# Patient Record
Sex: MALE | Race: WHITE | NOT HISPANIC OR LATINO | Employment: OTHER | URBAN - METROPOLITAN AREA
[De-identification: names, ages, dates, MRNs, and addresses within clinical notes are randomized per-mention and may not be internally consistent; named-entity substitution may affect disease eponyms.]

---

## 2024-06-14 ENCOUNTER — APPOINTMENT (OUTPATIENT)
Dept: GENERAL RADIOLOGY | Facility: HOSPITAL | Age: 64
End: 2024-06-14
Payer: MEDICARE

## 2024-06-14 ENCOUNTER — APPOINTMENT (OUTPATIENT)
Dept: CT IMAGING | Facility: HOSPITAL | Age: 64
End: 2024-06-14
Payer: MEDICARE

## 2024-06-14 ENCOUNTER — APPOINTMENT (OUTPATIENT)
Dept: MRI IMAGING | Facility: HOSPITAL | Age: 64
End: 2024-06-14
Payer: MEDICARE

## 2024-06-14 ENCOUNTER — HOSPITAL ENCOUNTER (INPATIENT)
Facility: HOSPITAL | Age: 64
LOS: 2 days | Discharge: NURSING, REHAB OR LONG-TERM CARE | End: 2024-06-18
Attending: EMERGENCY MEDICINE | Admitting: STUDENT IN AN ORGANIZED HEALTH CARE EDUCATION/TRAINING PROGRAM
Payer: MEDICARE

## 2024-06-14 DIAGNOSIS — N28.9 RENAL INSUFFICIENCY: ICD-10-CM

## 2024-06-14 DIAGNOSIS — E11.65 UNCONTROLLED TYPE 2 DIABETES MELLITUS WITH HYPERGLYCEMIA: ICD-10-CM

## 2024-06-14 DIAGNOSIS — R53.1 WEAKNESS: Primary | ICD-10-CM

## 2024-06-14 PROBLEM — G45.9 TIA (TRANSIENT ISCHEMIC ATTACK): Status: ACTIVE | Noted: 2024-06-14

## 2024-06-14 LAB
ABO GROUP BLD: NORMAL
ALBUMIN SERPL-MCNC: 4.2 G/DL (ref 3.5–5.2)
ALBUMIN/GLOB SERPL: 1.4 G/DL
ALP SERPL-CCNC: 96 U/L (ref 39–117)
ALT SERPL W P-5'-P-CCNC: 9 U/L (ref 1–41)
ANION GAP SERPL CALCULATED.3IONS-SCNC: 12.2 MMOL/L (ref 5–15)
APTT PPP: 27.5 SECONDS (ref 24–31)
AST SERPL-CCNC: 14 U/L (ref 1–40)
BASOPHILS # BLD AUTO: 0.06 10*3/MM3 (ref 0–0.2)
BASOPHILS NFR BLD AUTO: 0.5 % (ref 0–1.5)
BILIRUB SERPL-MCNC: 0.2 MG/DL (ref 0–1.2)
BLD GP AB SCN SERPL QL: NEGATIVE
BUN SERPL-MCNC: 30 MG/DL (ref 8–23)
BUN/CREAT SERPL: 14.3 (ref 7–25)
CALCIUM SPEC-SCNC: 9.4 MG/DL (ref 8.6–10.5)
CHLORIDE SERPL-SCNC: 92 MMOL/L (ref 98–107)
CO2 SERPL-SCNC: 29.8 MMOL/L (ref 22–29)
CREAT SERPL-MCNC: 2.1 MG/DL (ref 0.76–1.27)
DEPRECATED RDW RBC AUTO: 43.9 FL (ref 37–54)
EGFRCR SERPLBLD CKD-EPI 2021: 34.5 ML/MIN/1.73
EOSINOPHIL # BLD AUTO: 0.16 10*3/MM3 (ref 0–0.4)
EOSINOPHIL NFR BLD AUTO: 1.5 % (ref 0.3–6.2)
ERYTHROCYTE [DISTWIDTH] IN BLOOD BY AUTOMATED COUNT: 13.5 % (ref 12.3–15.4)
GLOBULIN UR ELPH-MCNC: 3.1 GM/DL
GLUCOSE BLDC GLUCOMTR-MCNC: 336 MG/DL (ref 70–105)
GLUCOSE SERPL-MCNC: 339 MG/DL (ref 65–99)
HCT VFR BLD AUTO: 39.5 % (ref 37.5–51)
HGB BLD-MCNC: 12.7 G/DL (ref 13–17.7)
HOLD SPECIMEN: NORMAL
HOLD SPECIMEN: NORMAL
IMM GRANULOCYTES # BLD AUTO: 0.04 10*3/MM3 (ref 0–0.05)
IMM GRANULOCYTES NFR BLD AUTO: 0.4 % (ref 0–0.5)
INR PPP: <0.93 (ref 0.93–1.1)
LYMPHOCYTES # BLD AUTO: 2.77 10*3/MM3 (ref 0.7–3.1)
LYMPHOCYTES NFR BLD AUTO: 25.1 % (ref 19.6–45.3)
MCH RBC QN AUTO: 28.4 PG (ref 26.6–33)
MCHC RBC AUTO-ENTMCNC: 32.2 G/DL (ref 31.5–35.7)
MCV RBC AUTO: 88.4 FL (ref 79–97)
MONOCYTES # BLD AUTO: 0.91 10*3/MM3 (ref 0.1–0.9)
MONOCYTES NFR BLD AUTO: 8.3 % (ref 5–12)
NEUTROPHILS NFR BLD AUTO: 64.2 % (ref 42.7–76)
NEUTROPHILS NFR BLD AUTO: 7.08 10*3/MM3 (ref 1.7–7)
NRBC BLD AUTO-RTO: 0 /100 WBC (ref 0–0.2)
PLATELET # BLD AUTO: 403 10*3/MM3 (ref 140–450)
PMV BLD AUTO: 10.1 FL (ref 6–12)
POTASSIUM SERPL-SCNC: 4.5 MMOL/L (ref 3.5–5.2)
PROT SERPL-MCNC: 7.3 G/DL (ref 6–8.5)
PROTHROMBIN TIME: 10.1 SECONDS (ref 9.6–11.7)
RBC # BLD AUTO: 4.47 10*6/MM3 (ref 4.14–5.8)
RH BLD: POSITIVE
SODIUM SERPL-SCNC: 134 MMOL/L (ref 136–145)
T&S EXPIRATION DATE: NORMAL
TROPONIN T SERPL HS-MCNC: 27 NG/L
WBC NRBC COR # BLD AUTO: 11.02 10*3/MM3 (ref 3.4–10.8)
WHOLE BLOOD HOLD COAG: NORMAL
WHOLE BLOOD HOLD SPECIMEN: NORMAL

## 2024-06-14 PROCEDURE — 86900 BLOOD TYPING SEROLOGIC ABO: CPT

## 2024-06-14 PROCEDURE — 82948 REAGENT STRIP/BLOOD GLUCOSE: CPT

## 2024-06-14 PROCEDURE — 86901 BLOOD TYPING SEROLOGIC RH(D): CPT

## 2024-06-14 PROCEDURE — 70498 CT ANGIOGRAPHY NECK: CPT

## 2024-06-14 PROCEDURE — 36415 COLL VENOUS BLD VENIPUNCTURE: CPT

## 2024-06-14 PROCEDURE — 85730 THROMBOPLASTIN TIME PARTIAL: CPT | Performed by: EMERGENCY MEDICINE

## 2024-06-14 PROCEDURE — 85025 COMPLETE CBC W/AUTO DIFF WBC: CPT | Performed by: EMERGENCY MEDICINE

## 2024-06-14 PROCEDURE — 70496 CT ANGIOGRAPHY HEAD: CPT

## 2024-06-14 PROCEDURE — 86901 BLOOD TYPING SEROLOGIC RH(D): CPT | Performed by: EMERGENCY MEDICINE

## 2024-06-14 PROCEDURE — 25510000001 IOPAMIDOL PER 1 ML: Performed by: EMERGENCY MEDICINE

## 2024-06-14 PROCEDURE — 80053 COMPREHEN METABOLIC PANEL: CPT | Performed by: EMERGENCY MEDICINE

## 2024-06-14 PROCEDURE — 71045 X-RAY EXAM CHEST 1 VIEW: CPT

## 2024-06-14 PROCEDURE — 0042T HC CT CEREBRAL PERFUSION W/WO CONTRAST: CPT

## 2024-06-14 PROCEDURE — 86900 BLOOD TYPING SEROLOGIC ABO: CPT | Performed by: EMERGENCY MEDICINE

## 2024-06-14 PROCEDURE — 84484 ASSAY OF TROPONIN QUANT: CPT | Performed by: EMERGENCY MEDICINE

## 2024-06-14 PROCEDURE — 99285 EMERGENCY DEPT VISIT HI MDM: CPT

## 2024-06-14 PROCEDURE — 86850 RBC ANTIBODY SCREEN: CPT | Performed by: EMERGENCY MEDICINE

## 2024-06-14 PROCEDURE — 70450 CT HEAD/BRAIN W/O DYE: CPT

## 2024-06-14 PROCEDURE — 99222 1ST HOSP IP/OBS MODERATE 55: CPT | Performed by: PSYCHIATRY & NEUROLOGY

## 2024-06-14 PROCEDURE — 70551 MRI BRAIN STEM W/O DYE: CPT

## 2024-06-14 PROCEDURE — 93005 ELECTROCARDIOGRAM TRACING: CPT | Performed by: EMERGENCY MEDICINE

## 2024-06-14 PROCEDURE — 85610 PROTHROMBIN TIME: CPT | Performed by: EMERGENCY MEDICINE

## 2024-06-14 RX ORDER — SODIUM CHLORIDE 0.9 % (FLUSH) 0.9 %
10 SYRINGE (ML) INJECTION AS NEEDED
Status: DISCONTINUED | OUTPATIENT
Start: 2024-06-14 | End: 2024-06-18 | Stop reason: HOSPADM

## 2024-06-14 RX ORDER — ACETAMINOPHEN 650 MG/1
650 SUPPOSITORY RECTAL EVERY 4 HOURS PRN
Status: DISCONTINUED | OUTPATIENT
Start: 2024-06-14 | End: 2024-06-18 | Stop reason: HOSPADM

## 2024-06-14 RX ORDER — POLYETHYLENE GLYCOL 3350 17 G/17G
17 POWDER, FOR SOLUTION ORAL DAILY PRN
Status: DISCONTINUED | OUTPATIENT
Start: 2024-06-14 | End: 2024-06-18 | Stop reason: HOSPADM

## 2024-06-14 RX ORDER — ATORVASTATIN CALCIUM 40 MG/1
80 TABLET, FILM COATED ORAL NIGHTLY
Status: DISCONTINUED | OUTPATIENT
Start: 2024-06-14 | End: 2024-06-17

## 2024-06-14 RX ORDER — HEPARIN SODIUM 5000 [USP'U]/ML
5000 INJECTION, SOLUTION INTRAVENOUS; SUBCUTANEOUS EVERY 8 HOURS SCHEDULED
Status: DISCONTINUED | OUTPATIENT
Start: 2024-06-15 | End: 2024-06-18 | Stop reason: HOSPADM

## 2024-06-14 RX ORDER — BISACODYL 5 MG/1
5 TABLET, DELAYED RELEASE ORAL DAILY PRN
Status: DISCONTINUED | OUTPATIENT
Start: 2024-06-14 | End: 2024-06-18 | Stop reason: HOSPADM

## 2024-06-14 RX ORDER — ASPIRIN 81 MG/1
81 TABLET, CHEWABLE ORAL DAILY
Status: DISCONTINUED | OUTPATIENT
Start: 2024-06-15 | End: 2024-06-18 | Stop reason: HOSPADM

## 2024-06-14 RX ORDER — CLOPIDOGREL 300 MG/1
300 TABLET, FILM COATED ORAL ONCE
Status: COMPLETED | OUTPATIENT
Start: 2024-06-14 | End: 2024-06-14

## 2024-06-14 RX ORDER — ALUMINA, MAGNESIA, AND SIMETHICONE 2400; 2400; 240 MG/30ML; MG/30ML; MG/30ML
7.5 SUSPENSION ORAL EVERY 4 HOURS PRN
Status: DISCONTINUED | OUTPATIENT
Start: 2024-06-14 | End: 2024-06-18 | Stop reason: HOSPADM

## 2024-06-14 RX ORDER — BISACODYL 10 MG
10 SUPPOSITORY, RECTAL RECTAL DAILY PRN
Status: DISCONTINUED | OUTPATIENT
Start: 2024-06-14 | End: 2024-06-18 | Stop reason: HOSPADM

## 2024-06-14 RX ORDER — BISACODYL 10 MG
10 SUPPOSITORY, RECTAL RECTAL DAILY PRN
Status: DISCONTINUED | OUTPATIENT
Start: 2024-06-14 | End: 2024-06-14 | Stop reason: SDUPTHER

## 2024-06-14 RX ORDER — SODIUM CHLORIDE 0.9 % (FLUSH) 0.9 %
10 SYRINGE (ML) INJECTION EVERY 12 HOURS SCHEDULED
Status: DISCONTINUED | OUTPATIENT
Start: 2024-06-14 | End: 2024-06-18 | Stop reason: HOSPADM

## 2024-06-14 RX ORDER — AMOXICILLIN 250 MG
2 CAPSULE ORAL 2 TIMES DAILY PRN
Status: DISCONTINUED | OUTPATIENT
Start: 2024-06-14 | End: 2024-06-18 | Stop reason: HOSPADM

## 2024-06-14 RX ORDER — ASPIRIN 300 MG/1
300 SUPPOSITORY RECTAL DAILY
Status: DISCONTINUED | OUTPATIENT
Start: 2024-06-15 | End: 2024-06-18 | Stop reason: HOSPADM

## 2024-06-14 RX ORDER — SODIUM CHLORIDE 9 MG/ML
40 INJECTION, SOLUTION INTRAVENOUS AS NEEDED
Status: DISCONTINUED | OUTPATIENT
Start: 2024-06-14 | End: 2024-06-18 | Stop reason: HOSPADM

## 2024-06-14 RX ORDER — CLOPIDOGREL BISULFATE 75 MG/1
75 TABLET ORAL DAILY
Status: DISCONTINUED | OUTPATIENT
Start: 2024-06-15 | End: 2024-06-18 | Stop reason: HOSPADM

## 2024-06-14 RX ORDER — ACETAMINOPHEN 325 MG/1
650 TABLET ORAL EVERY 4 HOURS PRN
Status: DISCONTINUED | OUTPATIENT
Start: 2024-06-14 | End: 2024-06-18 | Stop reason: HOSPADM

## 2024-06-14 RX ORDER — ONDANSETRON 2 MG/ML
4 INJECTION INTRAMUSCULAR; INTRAVENOUS EVERY 6 HOURS PRN
Status: DISCONTINUED | OUTPATIENT
Start: 2024-06-14 | End: 2024-06-18 | Stop reason: HOSPADM

## 2024-06-14 RX ADMIN — CLOPIDOGREL BISULFATE 300 MG: 300 TABLET, FILM COATED ORAL at 22:27

## 2024-06-14 RX ADMIN — ATORVASTATIN CALCIUM 80 MG: 40 TABLET, FILM COATED ORAL at 22:27

## 2024-06-14 RX ADMIN — Medication 10 ML: at 22:29

## 2024-06-14 RX ADMIN — IOPAMIDOL 150 ML: 755 INJECTION, SOLUTION INTRAVENOUS at 21:47

## 2024-06-14 NOTE — Clinical Note
Level of Care: Telemetry [5]   Admitting Physician: LLANES ALVAREZ, CARLOS [[id number]]   Attending Physician: LLANES ALVAREZ, CARLOS [[id number]]   Bed Request Comments: raul

## 2024-06-14 NOTE — LETTER
EMS Transport Request  For use at [facility], Given, Ambrosio, Sudhakar, and Shine only   Patient Name: Matthieu Hawk : 1960   Weight:(!) 168 kg (370 lb) Pick-up Location: [facility] BLS/ALS: BLS/ALS: BLS   Insurance: [employer] MEDICARE REPLACEMENT Auth End Date:    Pre-Cert #: D/C Summary complete:    Destination: Other [facility].   Room 214   Contact Precautions: None   Equipment (O2, Fluids, etc.): O2, settings 3L   Arrive By Date/Time: 24 Stretcher/WC: Wheelchair   CM Requesting: Sade Valero RN Ext: [phone]   Notes/Medical Necessity: CGA-Min A. For transfers     ______________________________________________________________________    *Only 2 patient bags OR 1 carry-on size bag are permitted.  Wheelchairs and walkers CANNOT transported with the patient. Acknowledge: Yes

## 2024-06-15 ENCOUNTER — APPOINTMENT (OUTPATIENT)
Dept: MRI IMAGING | Facility: HOSPITAL | Age: 64
End: 2024-06-15
Payer: MEDICARE

## 2024-06-15 ENCOUNTER — APPOINTMENT (OUTPATIENT)
Dept: CARDIOLOGY | Facility: HOSPITAL | Age: 64
End: 2024-06-15
Payer: MEDICARE

## 2024-06-15 ENCOUNTER — APPOINTMENT (OUTPATIENT)
Dept: ULTRASOUND IMAGING | Facility: HOSPITAL | Age: 64
End: 2024-06-15
Payer: MEDICARE

## 2024-06-15 LAB
ALBUMIN SERPL-MCNC: 4.1 G/DL (ref 3.5–5.2)
ALBUMIN/GLOB SERPL: 1.3 G/DL
ALP SERPL-CCNC: 91 U/L (ref 39–117)
ALT SERPL W P-5'-P-CCNC: 8 U/L (ref 1–41)
ANION GAP SERPL CALCULATED.3IONS-SCNC: 11.7 MMOL/L (ref 5–15)
AST SERPL-CCNC: 17 U/L (ref 1–40)
BASOPHILS # BLD AUTO: 0.06 10*3/MM3 (ref 0–0.2)
BASOPHILS NFR BLD AUTO: 0.6 % (ref 0–1.5)
BH CV XLRA MEAS LEFT DIST CCA EDV: 17.6 CM/SEC
BH CV XLRA MEAS LEFT DIST CCA PSV: 93.3 CM/SEC
BH CV XLRA MEAS LEFT DIST ICA EDV: -25.2 CM/SEC
BH CV XLRA MEAS LEFT DIST ICA PSV: -92.5 CM/SEC
BH CV XLRA MEAS LEFT ICA/CCA RATIO: -0.89
BH CV XLRA MEAS LEFT PROX CCA EDV: 25.2 CM/SEC
BH CV XLRA MEAS LEFT PROX CCA PSV: 104 CM/SEC
BH CV XLRA MEAS LEFT PROX ECA PSV: -109 CM/SEC
BH CV XLRA MEAS LEFT PROX ICA EDV: -18.9 CM/SEC
BH CV XLRA MEAS LEFT PROX ICA PSV: -89.7 CM/SEC
BH CV XLRA MEAS LEFT PROX SCLA PSV: 140 CM/SEC
BH CV XLRA MEAS LEFT VERTEBRAL A EDV: -10.5 CM/SEC
BH CV XLRA MEAS LEFT VERTEBRAL A PSV: -48.3 CM/SEC
BH CV XLRA MEAS RIGHT DIST CCA EDV: 16.8 CM/SEC
BH CV XLRA MEAS RIGHT DIST CCA PSV: 85.7 CM/SEC
BH CV XLRA MEAS RIGHT DIST ICA EDV: -24.9 CM/SEC
BH CV XLRA MEAS RIGHT DIST ICA PSV: -73.3 CM/SEC
BH CV XLRA MEAS RIGHT ICA/CCA RATIO: -0.75
BH CV XLRA MEAS RIGHT PROX CCA EDV: 18 CM/SEC
BH CV XLRA MEAS RIGHT PROX CCA PSV: 97.5 CM/SEC
BH CV XLRA MEAS RIGHT PROX ECA PSV: -112 CM/SEC
BH CV XLRA MEAS RIGHT PROX ICA EDV: -18 CM/SEC
BH CV XLRA MEAS RIGHT PROX ICA PSV: -65.2 CM/SEC
BH CV XLRA MEAS RIGHT PROX SCLA PSV: 136 CM/SEC
BH CV XLRA MEAS RIGHT VERTEBRAL A EDV: 16.8 CM/SEC
BH CV XLRA MEAS RIGHT VERTEBRAL A PSV: 58.4 CM/SEC
BILIRUB SERPL-MCNC: 0.2 MG/DL (ref 0–1.2)
BUN SERPL-MCNC: 32 MG/DL (ref 8–23)
BUN/CREAT SERPL: 18.2 (ref 7–25)
CALCIUM SPEC-SCNC: 9.3 MG/DL (ref 8.6–10.5)
CHLORIDE SERPL-SCNC: 94 MMOL/L (ref 98–107)
CHOLEST SERPL-MCNC: 127 MG/DL (ref 0–200)
CO2 SERPL-SCNC: 28.3 MMOL/L (ref 22–29)
CREAT SERPL-MCNC: 1.76 MG/DL (ref 0.76–1.27)
DEPRECATED RDW RBC AUTO: 43.6 FL (ref 37–54)
EGFRCR SERPLBLD CKD-EPI 2021: 42.6 ML/MIN/1.73
EOSINOPHIL # BLD AUTO: 0.11 10*3/MM3 (ref 0–0.4)
EOSINOPHIL NFR BLD AUTO: 1 % (ref 0.3–6.2)
ERYTHROCYTE [DISTWIDTH] IN BLOOD BY AUTOMATED COUNT: 13.4 % (ref 12.3–15.4)
ERYTHROCYTE [SEDIMENTATION RATE] IN BLOOD: 37 MM/HR (ref 0–20)
FOLATE SERPL-MCNC: 3.5 NG/ML (ref 4.78–24.2)
GEN 5 2HR TROPONIN T REFLEX: 20 NG/L
GLOBULIN UR ELPH-MCNC: 3.2 GM/DL
GLUCOSE BLDC GLUCOMTR-MCNC: 313 MG/DL (ref 70–105)
GLUCOSE BLDC GLUCOMTR-MCNC: 325 MG/DL (ref 70–105)
GLUCOSE BLDC GLUCOMTR-MCNC: 351 MG/DL (ref 70–105)
GLUCOSE BLDC GLUCOMTR-MCNC: 387 MG/DL (ref 70–105)
GLUCOSE SERPL-MCNC: 322 MG/DL (ref 65–99)
HBA1C MFR BLD: 9.68 % (ref 4.8–5.6)
HCT VFR BLD AUTO: 40.4 % (ref 37.5–51)
HDLC SERPL-MCNC: 40 MG/DL (ref 40–60)
HGB BLD-MCNC: 12.7 G/DL (ref 13–17.7)
IMM GRANULOCYTES # BLD AUTO: 0.03 10*3/MM3 (ref 0–0.05)
IMM GRANULOCYTES NFR BLD AUTO: 0.3 % (ref 0–0.5)
LDLC SERPL CALC-MCNC: 47 MG/DL (ref 0–100)
LDLC/HDLC SERPL: 0.89 {RATIO}
LYMPHOCYTES # BLD AUTO: 1.79 10*3/MM3 (ref 0.7–3.1)
LYMPHOCYTES NFR BLD AUTO: 16.6 % (ref 19.6–45.3)
MAGNESIUM SERPL-MCNC: 2.2 MG/DL (ref 1.6–2.4)
MCH RBC QN AUTO: 27.8 PG (ref 26.6–33)
MCHC RBC AUTO-ENTMCNC: 31.4 G/DL (ref 31.5–35.7)
MCV RBC AUTO: 88.4 FL (ref 79–97)
MONOCYTES # BLD AUTO: 0.78 10*3/MM3 (ref 0.1–0.9)
MONOCYTES NFR BLD AUTO: 7.2 % (ref 5–12)
NEUTROPHILS NFR BLD AUTO: 74.3 % (ref 42.7–76)
NEUTROPHILS NFR BLD AUTO: 8.03 10*3/MM3 (ref 1.7–7)
NRBC BLD AUTO-RTO: 0 /100 WBC (ref 0–0.2)
PHOSPHATE SERPL-MCNC: 4.9 MG/DL (ref 2.5–4.5)
PLATELET # BLD AUTO: 416 10*3/MM3 (ref 140–450)
PMV BLD AUTO: 10.2 FL (ref 6–12)
POTASSIUM SERPL-SCNC: 4.9 MMOL/L (ref 3.5–5.2)
PROT SERPL-MCNC: 7.3 G/DL (ref 6–8.5)
RBC # BLD AUTO: 4.57 10*6/MM3 (ref 4.14–5.8)
SODIUM SERPL-SCNC: 134 MMOL/L (ref 136–145)
TRIGL SERPL-MCNC: 257 MG/DL (ref 0–150)
TROPONIN T DELTA: -6 NG/L
TROPONIN T SERPL HS-MCNC: 26 NG/L
TSH SERPL DL<=0.05 MIU/L-ACNC: 0.94 UIU/ML (ref 0.27–4.2)
VIT B12 BLD-MCNC: 501 PG/ML (ref 211–946)
VLDLC SERPL-MCNC: 40 MG/DL (ref 5–40)
WBC NRBC COR # BLD AUTO: 10.8 10*3/MM3 (ref 3.4–10.8)

## 2024-06-15 PROCEDURE — 85025 COMPLETE CBC W/AUTO DIFF WBC: CPT | Performed by: STUDENT IN AN ORGANIZED HEALTH CARE EDUCATION/TRAINING PROGRAM

## 2024-06-15 PROCEDURE — 85652 RBC SED RATE AUTOMATED: CPT | Performed by: PSYCHIATRY & NEUROLOGY

## 2024-06-15 PROCEDURE — 82948 REAGENT STRIP/BLOOD GLUCOSE: CPT | Performed by: STUDENT IN AN ORGANIZED HEALTH CARE EDUCATION/TRAINING PROGRAM

## 2024-06-15 PROCEDURE — 93306 TTE W/DOPPLER COMPLETE: CPT | Performed by: INTERNAL MEDICINE

## 2024-06-15 PROCEDURE — 84100 ASSAY OF PHOSPHORUS: CPT | Performed by: STUDENT IN AN ORGANIZED HEALTH CARE EDUCATION/TRAINING PROGRAM

## 2024-06-15 PROCEDURE — 93306 TTE W/DOPPLER COMPLETE: CPT

## 2024-06-15 PROCEDURE — G0378 HOSPITAL OBSERVATION PER HR: HCPCS

## 2024-06-15 PROCEDURE — 93880 EXTRACRANIAL BILAT STUDY: CPT

## 2024-06-15 PROCEDURE — 97162 PT EVAL MOD COMPLEX 30 MIN: CPT

## 2024-06-15 PROCEDURE — 99232 SBSQ HOSP IP/OBS MODERATE 35: CPT | Performed by: NURSE PRACTITIONER

## 2024-06-15 PROCEDURE — 93880 EXTRACRANIAL BILAT STUDY: CPT | Performed by: SURGERY

## 2024-06-15 PROCEDURE — 25010000002 GADOTERIDOL PER 1 ML: Performed by: STUDENT IN AN ORGANIZED HEALTH CARE EDUCATION/TRAINING PROGRAM

## 2024-06-15 PROCEDURE — 83735 ASSAY OF MAGNESIUM: CPT | Performed by: STUDENT IN AN ORGANIZED HEALTH CARE EDUCATION/TRAINING PROGRAM

## 2024-06-15 PROCEDURE — 25010000002 HEPARIN (PORCINE) PER 1000 UNITS: Performed by: STUDENT IN AN ORGANIZED HEALTH CARE EDUCATION/TRAINING PROGRAM

## 2024-06-15 PROCEDURE — 80053 COMPREHEN METABOLIC PANEL: CPT | Performed by: STUDENT IN AN ORGANIZED HEALTH CARE EDUCATION/TRAINING PROGRAM

## 2024-06-15 PROCEDURE — 63710000001 INSULIN GLARGINE PER 5 UNITS: Performed by: STUDENT IN AN ORGANIZED HEALTH CARE EDUCATION/TRAINING PROGRAM

## 2024-06-15 PROCEDURE — A9579 GAD-BASE MR CONTRAST NOS,1ML: HCPCS | Performed by: STUDENT IN AN ORGANIZED HEALTH CARE EDUCATION/TRAINING PROGRAM

## 2024-06-15 PROCEDURE — 76775 US EXAM ABDO BACK WALL LIM: CPT

## 2024-06-15 PROCEDURE — 25010000002 ONDANSETRON PER 1 MG: Performed by: PSYCHIATRY & NEUROLOGY

## 2024-06-15 PROCEDURE — 82746 ASSAY OF FOLIC ACID SERUM: CPT | Performed by: PSYCHIATRY & NEUROLOGY

## 2024-06-15 PROCEDURE — 63710000001 INSULIN LISPRO (HUMAN) PER 5 UNITS: Performed by: STUDENT IN AN ORGANIZED HEALTH CARE EDUCATION/TRAINING PROGRAM

## 2024-06-15 PROCEDURE — 25810000003 LACTATED RINGERS PER 1000 ML: Performed by: STUDENT IN AN ORGANIZED HEALTH CARE EDUCATION/TRAINING PROGRAM

## 2024-06-15 PROCEDURE — 72157 MRI CHEST SPINE W/O & W/DYE: CPT

## 2024-06-15 PROCEDURE — 82607 VITAMIN B-12: CPT | Performed by: PSYCHIATRY & NEUROLOGY

## 2024-06-15 PROCEDURE — 84484 ASSAY OF TROPONIN QUANT: CPT | Performed by: STUDENT IN AN ORGANIZED HEALTH CARE EDUCATION/TRAINING PROGRAM

## 2024-06-15 PROCEDURE — 82948 REAGENT STRIP/BLOOD GLUCOSE: CPT

## 2024-06-15 PROCEDURE — 83036 HEMOGLOBIN GLYCOSYLATED A1C: CPT | Performed by: PSYCHIATRY & NEUROLOGY

## 2024-06-15 PROCEDURE — 84443 ASSAY THYROID STIM HORMONE: CPT | Performed by: PSYCHIATRY & NEUROLOGY

## 2024-06-15 PROCEDURE — 80061 LIPID PANEL: CPT | Performed by: PSYCHIATRY & NEUROLOGY

## 2024-06-15 PROCEDURE — 72158 MRI LUMBAR SPINE W/O & W/DYE: CPT

## 2024-06-15 PROCEDURE — 25010000002 SULFUR HEXAFLUORIDE MICROSPH 60.7-25 MG RECONSTITUTED SUSPENSION: Performed by: STUDENT IN AN ORGANIZED HEALTH CARE EDUCATION/TRAINING PROGRAM

## 2024-06-15 RX ORDER — CARVEDILOL 6.25 MG/1
12.5 TABLET ORAL 2 TIMES DAILY WITH MEALS
Status: DISCONTINUED | OUTPATIENT
Start: 2024-06-15 | End: 2024-06-17

## 2024-06-15 RX ORDER — GABAPENTIN 300 MG/1
300 CAPSULE ORAL 3 TIMES DAILY
Status: DISCONTINUED | OUTPATIENT
Start: 2024-06-15 | End: 2024-06-18 | Stop reason: HOSPADM

## 2024-06-15 RX ORDER — DEXTROSE MONOHYDRATE 25 G/50ML
25 INJECTION, SOLUTION INTRAVENOUS
Status: DISCONTINUED | OUTPATIENT
Start: 2024-06-15 | End: 2024-06-18 | Stop reason: HOSPADM

## 2024-06-15 RX ORDER — SODIUM CHLORIDE, SODIUM LACTATE, POTASSIUM CHLORIDE, CALCIUM CHLORIDE 600; 310; 30; 20 MG/100ML; MG/100ML; MG/100ML; MG/100ML
75 INJECTION, SOLUTION INTRAVENOUS CONTINUOUS
Status: DISCONTINUED | OUTPATIENT
Start: 2024-06-15 | End: 2024-06-15

## 2024-06-15 RX ORDER — AMLODIPINE BESYLATE 5 MG/1
5 TABLET ORAL
Status: DISCONTINUED | OUTPATIENT
Start: 2024-06-15 | End: 2024-06-17

## 2024-06-15 RX ORDER — NICOTINE POLACRILEX 4 MG
15 LOZENGE BUCCAL
Status: DISCONTINUED | OUTPATIENT
Start: 2024-06-15 | End: 2024-06-18 | Stop reason: HOSPADM

## 2024-06-15 RX ORDER — FINASTERIDE 5 MG/1
5 TABLET, FILM COATED ORAL DAILY
Status: DISCONTINUED | OUTPATIENT
Start: 2024-06-15 | End: 2024-06-17

## 2024-06-15 RX ORDER — INSULIN LISPRO 100 [IU]/ML
2-9 INJECTION, SOLUTION INTRAVENOUS; SUBCUTANEOUS
Status: DISCONTINUED | OUTPATIENT
Start: 2024-06-15 | End: 2024-06-17

## 2024-06-15 RX ORDER — SODIUM CHLORIDE, SODIUM LACTATE, POTASSIUM CHLORIDE, CALCIUM CHLORIDE 600; 310; 30; 20 MG/100ML; MG/100ML; MG/100ML; MG/100ML
100 INJECTION, SOLUTION INTRAVENOUS CONTINUOUS
Status: DISCONTINUED | OUTPATIENT
Start: 2024-06-15 | End: 2024-06-16

## 2024-06-15 RX ORDER — IBUPROFEN 600 MG/1
1 TABLET ORAL
Status: DISCONTINUED | OUTPATIENT
Start: 2024-06-15 | End: 2024-06-18 | Stop reason: HOSPADM

## 2024-06-15 RX ORDER — FOLIC ACID 1 MG/1
1 TABLET ORAL DAILY
Status: DISCONTINUED | OUTPATIENT
Start: 2024-06-15 | End: 2024-06-18 | Stop reason: HOSPADM

## 2024-06-15 RX ORDER — DULOXETIN HYDROCHLORIDE 30 MG/1
60 CAPSULE, DELAYED RELEASE ORAL EVERY 12 HOURS SCHEDULED
Status: DISCONTINUED | OUTPATIENT
Start: 2024-06-15 | End: 2024-06-17

## 2024-06-15 RX ORDER — HYDROCODONE BITARTRATE AND ACETAMINOPHEN 10; 325 MG/1; MG/1
1 TABLET ORAL EVERY 6 HOURS PRN
Status: DISCONTINUED | OUTPATIENT
Start: 2024-06-15 | End: 2024-06-18 | Stop reason: HOSPADM

## 2024-06-15 RX ADMIN — ATORVASTATIN CALCIUM 80 MG: 40 TABLET, FILM COATED ORAL at 20:06

## 2024-06-15 RX ADMIN — CLOPIDOGREL BISULFATE 75 MG: 75 TABLET ORAL at 09:39

## 2024-06-15 RX ADMIN — DULOXETINE HYDROCHLORIDE 60 MG: 30 CAPSULE, DELAYED RELEASE ORAL at 20:06

## 2024-06-15 RX ADMIN — Medication 10 ML: at 00:24

## 2024-06-15 RX ADMIN — HYDROCODONE BITARTRATE AND ACETAMINOPHEN 1 TABLET: 10; 325 TABLET ORAL at 20:06

## 2024-06-15 RX ADMIN — HEPARIN SODIUM 5000 UNITS: 5000 INJECTION INTRAVENOUS; SUBCUTANEOUS at 05:57

## 2024-06-15 RX ADMIN — HEPARIN SODIUM 5000 UNITS: 5000 INJECTION INTRAVENOUS; SUBCUTANEOUS at 21:04

## 2024-06-15 RX ADMIN — INSULIN GLARGINE 10 UNITS: 100 INJECTION, SOLUTION SUBCUTANEOUS at 20:07

## 2024-06-15 RX ADMIN — Medication 10 ML: at 09:39

## 2024-06-15 RX ADMIN — FOLIC ACID 1 MG: 1 TABLET ORAL at 17:55

## 2024-06-15 RX ADMIN — FINASTERIDE 5 MG: 5 TABLET, FILM COATED ORAL at 09:38

## 2024-06-15 RX ADMIN — AMLODIPINE BESYLATE 5 MG: 5 TABLET ORAL at 09:39

## 2024-06-15 RX ADMIN — INSULIN GLARGINE 10 UNITS: 100 INJECTION, SOLUTION SUBCUTANEOUS at 09:39

## 2024-06-15 RX ADMIN — Medication 10 ML: at 20:07

## 2024-06-15 RX ADMIN — Medication 10 ML: at 11:14

## 2024-06-15 RX ADMIN — CARVEDILOL 12.5 MG: 6.25 TABLET, FILM COATED ORAL at 09:39

## 2024-06-15 RX ADMIN — ASPIRIN 81 MG CHEWABLE TABLET 81 MG: 81 TABLET CHEWABLE at 09:38

## 2024-06-15 RX ADMIN — INSULIN LISPRO 7 UNITS: 100 INJECTION, SOLUTION INTRAVENOUS; SUBCUTANEOUS at 17:55

## 2024-06-15 RX ADMIN — ONDANSETRON 4 MG: 2 INJECTION INTRAMUSCULAR; INTRAVENOUS at 00:21

## 2024-06-15 RX ADMIN — SODIUM CHLORIDE, POTASSIUM CHLORIDE, SODIUM LACTATE AND CALCIUM CHLORIDE 100 ML/HR: 600; 310; 30; 20 INJECTION, SOLUTION INTRAVENOUS at 17:56

## 2024-06-15 RX ADMIN — DULOXETINE HYDROCHLORIDE 60 MG: 30 CAPSULE, DELAYED RELEASE ORAL at 09:38

## 2024-06-15 RX ADMIN — HEPARIN SODIUM 5000 UNITS: 5000 INJECTION INTRAVENOUS; SUBCUTANEOUS at 13:00

## 2024-06-15 RX ADMIN — SULFUR HEXAFLUORIDE 2 ML: KIT at 09:54

## 2024-06-15 RX ADMIN — SODIUM CHLORIDE, POTASSIUM CHLORIDE, SODIUM LACTATE AND CALCIUM CHLORIDE 75 ML/HR: 600; 310; 30; 20 INJECTION, SOLUTION INTRAVENOUS at 05:57

## 2024-06-15 RX ADMIN — GADOTERIDOL 20 ML: 279.3 INJECTION, SOLUTION INTRAVENOUS at 15:08

## 2024-06-15 RX ADMIN — ACETAMINOPHEN 650 MG: 325 TABLET, FILM COATED ORAL at 00:21

## 2024-06-15 RX ADMIN — CARVEDILOL 12.5 MG: 6.25 TABLET, FILM COATED ORAL at 17:55

## 2024-06-15 RX ADMIN — INSULIN LISPRO 7 UNITS: 100 INJECTION, SOLUTION INTRAVENOUS; SUBCUTANEOUS at 09:39

## 2024-06-15 RX ADMIN — HYDROCODONE BITARTRATE AND ACETAMINOPHEN 1 TABLET: 10; 325 TABLET ORAL at 09:38

## 2024-06-15 RX ADMIN — INSULIN LISPRO 8 UNITS: 100 INJECTION, SOLUTION INTRAVENOUS; SUBCUTANEOUS at 13:00

## 2024-06-15 RX ADMIN — Medication 10 ML: at 21:03

## 2024-06-15 RX ADMIN — INSULIN LISPRO 8 UNITS: 100 INJECTION, SOLUTION INTRAVENOUS; SUBCUTANEOUS at 20:06

## 2024-06-15 NOTE — CONSULTS
[facility]   Teleneurology Note    Patient Name: Matthieu Hawk  : 1960  MRN: [id number]  Primary Care Physician: No primary care provider on file.  Referring Site: [place]    Subjective   Teleneurology Initial Data     Arrival Date Telestroke Site: 24 Arrival Time Telestroke Site:    Neurologist Evaluation Date: 24 Neurologist Evaluation Time:    Date Last Known Well: 24 Time Last Known Well: 1600     History     Chief Complaint: New onset numbness in the left leg that started around 4 PM today.  HPI: 63-year-old right-handed white male with a history of morbid obesity, hypertension, diabetic peripheral neuropathy, diabetes type 2, underlying obstructive sleep apnea and chronic low back pain has been having issues with his legs over the last 2 to 3 weeks.  However today he started feeling numb in the left leg for which reason he decided to be admitted to the hospital for further workup.  When I saw the patient through the telemedicine device at the CT scanner, I noticed that he is awake and alert and oriented x 4.  He is able to follow one-step, two-step and three-step commands without any issues.  There is no drift noticeable in the upper extremities.  However in both lower extremities he had difficulty keeping the legs up in the air for 5 seconds.  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  However he feels that the sensation is less in the left lower extremity compared to the right.  There is extinction on double simultaneous presentation of noxious stimulation in the left leg.  His speech is normal and his vision is normal.  No facial asymmetry noted.  The NIH stroke scale is 2.  Patient not a candidate for any acute thrombolytic therapy at this point as this is not disabling.    Stroke Risk Factors/ Pertinent Data     Stroke risk factors: diabetes, dyslipidemia, hypertension, sleep apnea, obesity/ physical inactivity  Anticoagulants prior to  arrival: none  Antiplatelets prior to arrival: none  Statins prior to arrival: none     Scoring Scales     Modified Walton Scale  Pre-Stroke Modified Alicia Scale: 3 - Moderate disability.  Requiring some help, but able to walk without assistance.  Intracerebral Hemmorhage (ICH) Score  Cody Coma Score: 13-15  Age>=80: no  Cody Coma Scale  Best Eye Response: Spontaneous  Best Verbal Response: Oriented  Best Motor Response: Follows commands  [place] Coma Scale Score: 15    NIH Stroke Scale     NIHSS Performed Date: 06/14/24 NIHSS Performed Time: 2120   Interval: baseline  1a. Level of Consciousness: 0-->Alert, keenly responsive  1b. LOC Questions: 0-->Answers both questions correctly  1c. LOC Commands: 0-->Performs both tasks correctly  2. Best Gaze: 0-->Normal  3. Visual: 0-->No visual loss  4. Facial Palsy: 0-->Normal symmetrical movements  5a. Motor Arm, Left: 0-->No drift, limb holds 90 (or 45) degrees for full 10 secs  5b. Motor Arm, Right: 0-->No drift, limb holds 90 (or 45) degrees for full 10 secs  6a. Motor Leg, Left: 0-->No drift, leg holds 30 degree position for full 5 secs  6b. Motor Leg, Right: 0-->No drift, leg holds 30 degree position for full 5 secs  7. Limb Ataxia: 0-->Absent  8. Sensory: 1-->Mild-to-moderate sensory loss, patient feels pinprick is less sharp or is dull on the affected side, or there is a loss of superficial pain with pinprick, but patient is aware of being touched  9. Best Language: 0-->No aphasia, normal  10. Dysarthria: 0-->Normal  11. Extinction and Inattention (formerly Neglect): 0-->No abnormality  Total (NIH Stroke Scale): 1     Review of Systems     Review of Systems   Constitutional: Negative.    HENT: Negative.     Eyes: Negative.    Respiratory: Negative.     Cardiovascular: Negative.    Gastrointestinal: Negative.    Endocrine: Negative.    Genitourinary: Negative.    Musculoskeletal:  Positive for back pain and gait problem.   Skin: Negative.     Allergic/Immunologic: Negative.    Neurological:  Positive for numbness.   Hematological: Negative.    Psychiatric/Behavioral: Negative.       Severe back pain for the last 2 to 3 weeks which makes it difficult for him to get up and walk and he has difficulty picking up his legs.  Objective   Exam     Exam performed with the help of support staff from the referring site  Neurological Exam  Mental Status  Awake, alert and oriented to person, place and time. Oriented to person, place, time and situation. Recent and remote memory are intact. Speech is normal. Language is fluent with no aphasia. Attention and concentration are normal. Fund of knowledge is appropriate for level of education.    Cranial Nerves  CN I: Sense of smell is normal.  CN II: Visual acuity is normal. Visual fields full to confrontation.  CN III, IV, VI: Extraocular movements intact bilaterally. Normal lids and orbits bilaterally. Pupils equal round and reactive to light bilaterally.  CN V: Facial sensation is normal.  CN VII: Full and symmetric facial movement.  CN IX, X: Palate elevates symmetrically. Normal gag reflex.  CN XI: Shoulder shrug strength is normal.  CN XII: Tongue midline without atrophy or fasciculations.    Motor  Normal muscle bulk throughout. No fasciculations present. Normal muscle tone. No abnormal involuntary movements. No pronator drift.  Patient has been noted to have both lower extremity weakness over the last 2 to 3 weeks because of back pain..    Sensory  Left hemisensory loss.   Decreased sensation in the left lower extremity compared to the right and on double simultaneous presentation of noxious stimulation he completely extinguishes the left lower extremity..    Coordination  Right: Finger-to-nose normal.Left: Finger-to-nose normal.  Unable to check for the heel-to-shin testing bilaterally because of severe low back pain..    Gait    Unable to check gait or Romberg at this time.  Patient is currently laying on the  CT scanner bed..    Patient has peripheral neuropathy from the diabetes.  Result Review    Results          Personal review of CNS imaging:(Official report by radiologist pending)  Imaging  CT Imaging Review: CT Imaging reviewed, NO acute infarct/ hemorrhage seen    Thrombolytic   Thrombolytics: not applicable     Assessment & Plan   Assessment/ Plan     Assessment:  Acute Stroke Evaluation: Suspected TIA or non disabling stroke- the risks of IV thrombolytic outweigh the benefits of treatment  .  Patient not a candidate for any acute thrombolytic therapy as the event started at 4 PM and he presented after 5 hours and also the NIH stroke scale is only 2 which is nondisabling and the risks versus benefits of acute thrombolytic therapy are high.      Plan:  Discussed with the patient and Dr. Scott Kiran, the emergency room physician that he should be brought in for observation under hospitalist with cardiac telemetry monitoring.  His speech is clear and after the bedside swallow has been performed and he has passed he should be getting a baby aspirin along with a loading dose of Plavix 300 mg followed by baby aspirin and Plavix 75 mg daily for the next 21 days.  Atorvastatin 80 mg daily.  Transthoracic echocardiogram with a bubble study for tomorrow to look for any PFO.  An MRI of the brain with a stroke protocol for tomorrow.  As he is morbidly obese with a high Mallampati score of 4 he should be evaluated for underlying obstructive sleep apnea.  Lab work for tomorrow includes lipid profile, TSH, sedimentation rate, vitamin B12, hemoglobin A1c.  He will be assessed by physical therapy/Occupational Therapy/speech pathology to evaluate for inpatient rehabilitation potential.  He will be getting a carbohydrate controlled cardiac diet.  He will be followed up tomorrow by the inpatient neurohospitalist.         Disposition     Disposition: The patient will remain at the referring institution for further evaluation and  management    Medical Decision Making  Medical Data Reviewed: Data reviewed including: clinical labs, radiology and/or medical tests, Obtaining/ reviewing old medical records, Obtaining case history from another source, Independent review of CNS images  Length of visit: 30 minutes in this critical care evaluation and management of the acute stroke symptoms in the emergency room setting    Minoo DIEGO MD, saw the patient on 06/14/24 at 2120 for an initial in-patient or emergency room telememedicine face to face consult using interactive technology for 30 minutes. The location of the patient was [place]. I was located at [facility] .    I have proceeded with this evaluation at the request of the referring practitioner as it is felt to be an emergency setting and no appropriate specialist is available to perform this evaluation. The originating hospital has reported that this is the correct patient and has obtained consent from the patient/surrogate to perform this telemedicine evaluation(including obtaining history, performing examination and reviewing data provided by the patient an/or originating site of care provider)    I have introduced myself to the patient, provided my credentials, disclosed my location, and determined that, based on review of the patient's chart and discussion with the patient's primary team, telemedicine via a HIPAA compliant, real-time, face-to-face two-way, interactive audio and video platform is an appropriate and effective means of providing the service.    The patient/surrogate has a right to refuse this evaluation as they have been explained risks including potential loss of confidentiality, benefits, alternatives, and the potential need for subsequent face-to-face care. In this evaluation, we will be providing recommendations only.  The ultimate decision to follow or not to follow these recommendations will be left to the bedside treating/requesting  practitioner.    The patient/surrogate has been notified that other healthcare professionals including technical person may be involved in this A/V evaluation.  All laws concerning confidentiality and patient access to medical records and copies of medical records apply to telemedicine.  The patient/surrogate has received the originating site's Health Notice of Privacy Practices.    Minoo Parker MD

## 2024-06-15 NOTE — PROGRESS NOTES
LOS: 0 days     Chief Complaint:  Left leg numbness       SUBJECTIVE:    History taken from: patient chart RN    Interval History: Pt was seen by Teleneurology approx 2140 6/14- H&P per TeleNeurology note:  HPI: 63-year-old right-handed white male with a history of morbid obesity, hypertension, diabetic peripheral neuropathy, diabetes type 2, underlying obstructive sleep apnea and chronic low back pain has been having issues with his legs over the last 2 to 3 weeks.  However today he started feeling numb in the left leg for which reason he decided to be admitted to the hospital for further workup.  When I saw the patient through the telemedicine device at the CT scanner, I noticed that he is awake and alert and oriented x 4.  He is able to follow one-step, two-step and three-step commands without any issues.  There is no drift noticeable in the upper extremities.  However in both lower extremities he had difficulty keeping the legs up in the air for 5 seconds.  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  However he feels that the sensation is less in the left lower extremity compared to the right.  There is extinction on double simultaneous presentation of noxious stimulation in the left leg.  His speech is normal and his vision is normal.  No facial asymmetry noted.  The NIH stroke scale is 2.  Patient not a candidate for any acute thrombolytic therapy at this point as this is not disabling.    History obtained from patient, wife and family at bedside.  Patient started having increasing shortness of breath 2 or 3 months prior.  He states they discussed this with her primary care but nothing became of it.  Due to the patient having increased shortness of breath, patient became less mobile.  He has been following now for the past several months.  Patient states sometimes he feels his legs just give out from him but other times he is having lightheadedness prior to dropping.  Yesterday  he fell twice and the second time he was unable to stand up even with the help of his wife.  He denies any loss of consciousness, no postictal state no confusion afterwards.  Patient feels that his legs are very weak, especially the left one which has been going on for about 2 to 3 weeks now.  He denies any known history of stroke, seizures.    Patient does have sleep apnea which recently he did get a new machine but they did not send the mask so he has not been wearing any type of positive pressure system at nighttime.  Patient is a very large gentleman, BMI of 53.  We discussed his uncontrolled sleep apnea on top of his obesity which causes a lot more issues alone.  Wife states that he has been less short of breath since has been on oxygen here at the hospital and they have been wanting oxygen at home.  Otherwise, patient's biggest complaint is his leg weakness which has been going on past several months.  Also he has been having low back pain and difficulty with his left leg..           Review of Systems   Constitutional:  Positive for activity change and fatigue.   Eyes:  Negative for visual disturbance.   Respiratory:  Positive for shortness of breath.    Cardiovascular: Negative.    Gastrointestinal:  Negative for nausea and vomiting.   Endocrine: Negative.    Genitourinary: Negative.    Musculoskeletal:  Positive for back pain.   Skin: Negative.    Allergic/Immunologic: Negative.    Neurological:  Positive for weakness, light-headedness and numbness. Negative for dizziness, tremors, seizures, syncope, facial asymmetry, speech difficulty and headaches.   Hematological: Negative.    Psychiatric/Behavioral:  Positive for sleep disturbance. Negative for confusion.            Pertinent PMH:  has no past medical history on file.   ________________________________________________     OBJECTIVE:    PHYSICAL EXAM:     Constitutional: The patient is in no apparent distress,  awake and alert. There is no shortness of  breath.     PSYCHIATRIC: Mood/affect normal, judgement normal, appropriate    HEENT: Normocephalic, atraumatic.     Chest: Breathing unlabored    Cardiac: Regular rate and rhythm.     Extremities:  No clubbing, cyanosis or edema.    NEUROLOGICAL:    Cognition:   Oriented x 3  Fund of knowledge decent.  Concentration and attention normal.   Language normal with normal comprehension, fluent speech, intact repetition and naming.        Cranial nerves;    II - pupils bilaterally equal reacting to light,  No new Visual field deficits;  Fundoscopic exam- Not able to be done, non-dilated exam  III,IV,VI: EOMI with no diplopia-mild left ptosis, possibly chronic  V: Normal facial sensations  VII: No facial asymmetry,  VIII: No New hearing abnormality  IX, X, XI: normal gag and shoulder shrug;  XII: tongue is in the midline.    Sensory:  Less in the left leg     Motor: Strength 5-/5 bilaterally upper and lower extremities 4/5 RLE 3/5 LLE- No involuntary movements present. Normal tone and bulk.  Deep tendon reflexes: 0/4 and symmetrical in biceps, brachioradialis, triceps, bilateral /04 knees and ankles.       Cerebellar: Finger to nose and mirror movements normal bilaterally.    Gait and balance: Deferred.     Physical exam performed by SEAN Richard.  ________________________________________________   RESULTS REVIEW    VITAL SIGNS:  Temp:  [97.7 °F (36.5 °C)-97.9 °F (36.6 °C)] 97.9 °F (36.6 °C)  Heart Rate:  [] 107  Resp:  [14-18] 16  BP: ()/(57-95) 183/79    LABS:       Lab 06/15/24  0340 06/14/24  2138   WBC 10.80 11.02*   HEMOGLOBIN 12.7* 12.7*   HEMATOCRIT 40.4 39.5   PLATELETS 416 403   NEUTROS ABS 8.03* 7.08*   IMMATURE GRANS (ABS) 0.03 0.04   LYMPHS ABS 1.79 2.77   MONOS ABS 0.78 0.91*   EOS ABS 0.11 0.16   MCV 88.4 88.4   SED RATE 37*  --    PROTIME  --  10.1   APTT  --  27.5         Lab 06/15/24  0340 06/14/24  2138   SODIUM 134* 134*   POTASSIUM 4.9 4.5   CHLORIDE 94* 92*   CO2 28.3 29.8*   ANION  GAP 11.7 12.2   BUN 32* 30*   CREATININE 1.76* 2.10*   EGFR 42.6* 34.5*   GLUCOSE 322* 339*   CALCIUM 9.3 9.4   MAGNESIUM 2.2  --    PHOSPHORUS 4.9*  --    HEMOGLOBIN A1C 9.68*  --    TSH 0.941  --          Lab 06/15/24  0340 06/14/24  2138   TOTAL PROTEIN 7.3 7.3   ALBUMIN 4.1 4.2   GLOBULIN 3.2 3.1   ALT (SGPT) 8 9   AST (SGOT) 17 14   BILIRUBIN 0.2 0.2   ALK PHOS 91 96         Lab 06/15/24  0340 06/14/24  2138   HSTROP T 26* 27*   PROTIME  --  10.1   INR  --  <0.93*         Lab 06/15/24  0340   CHOLESTEROL 127   LDL CHOL 47   HDL CHOL 40   TRIGLYCERIDES 257*         Lab 06/14/24  2158   ABO TYPING O   RH TYPING Positive   ANTIBODY SCREEN Negative             Lab Results   Component Value Date    TSH 0.941 06/15/2024    LDL 47 06/15/2024    HGBA1C 9.68 (H) 06/15/2024         IMAGING STUDIES:  US Renal Bilateral    Result Date: 6/15/2024  Impression: Normal ultrasound appearance of the kidneys with no obstructive uropathy Electronically Signed: José Miguel Muse  6/15/2024 7:29 AM EDT  Workstation ID: [id number]    MRI Brain Without Contrast    Result Date: 6/14/2024  Impression: 1. No acute intracranial abnormality. 2. Minimal chronic small vessel ischemic change. Electronically Signed: Scotty Rosario MD  6/14/2024 11:49 PM EDT  Workstation ID: [id number]    XR Chest 1 View    Result Date: 6/14/2024  Impression: No acute cardiopulmonary disease. Electronically Signed: Zenon Rodriguez MD  6/14/2024 10:31 PM EDT  Workstation ID: [id number]    CT Angiogram Head w AI Analysis of LVO    Result Date: 6/14/2024  1.No hemodynamically significant stenosis, large vessel occlusion or aneurysms in intracranial circulation 2.No hemodynamically significant stenosis, dissection or aneurysms in extracranial circulation. Electronically Signed: Ben Jay MD  6/14/2024 10:03 PM EDT  Workstation ID: [id number]    CT Angiogram Neck    Result Date: 6/14/2024  1.No hemodynamically significant stenosis, large vessel occlusion or aneurysms in  intracranial circulation 2.No hemodynamically significant stenosis, dissection or aneurysms in extracranial circulation. Electronically Signed: Ben Jay MD  6/14/2024 10:03 PM EDT  Workstation ID: [id number]    CT CEREBRAL PERFUSION WITH & WITHOUT CONTRAST    Result Date: 6/14/2024  Negative for completed infarct or ischemic penumbra. Electronically Signed: Ben Jay MD  6/14/2024 9:55 PM EDT  Workstation ID: [id number]    CT Head Without Contrast Stroke Protocol    Result Date: 6/14/2024  Impression: Negative for acute intracranial hemorrhage, large territory infarct, midline shift or hydrocephalus. Electronically Signed: Ben Jay MD  6/14/2024 9:27 PM EDT  Workstation ID: [id number]     I reviewed the patient's new clinical results.    ________________________________________________      PROBLEM LIST:    TIA (transient ischemic attack)        ASSESSMENT/PLAN:  Subacute left leg weakness/numbness, back pain, falls.   No stroke on MRI brain. Likely combination of deconditioning and pain.   - CT head negative for hemorrhage   - MRI brain: reviewed- no acute or subacute stroke.   - MRI lumbar spine- small disc bulge, epidural lipmatosis, moderate narrowing.   - CTA head and neck: No significant stenosis, occlusion, aneurysm or dissection  - Echo: pending   - EKG: SR rate 95  - Labs: A1C: 9.67, B12: 501, LDL: 47, TSH: 0.941  - Neurosurgery eval electively   - PT/OT eval    2.  Dizziness, near syncope and collapse-May be related to worsening shortness of breath-   - Will check CTA head and neck to rule out vascular etiology  -Patient likely need Holter monitor  -Nothing at this time to suggest seizures    3.  Type 2 Diabetes Mellitus  - A1C: P  - Strict glycemic control, SSI, diabetic diet, diabetes educator    4.  Low folate-replacement ordered    5.  Acute kidney injury, creatinine 2.10, BUN 30, GFR 34    6.  Morbidity obese-BMI of 53-lifestyle modifications discussed including diet    7.  Untreated  obstructive sleep apnea-discussed getting a mask for patient's new machine-patient and family states they will reach out to his sleep doctor        I discussed the patient's findings and my recommendations with patient and family    JESSICA Dey  06/15/24  07:42 EDT

## 2024-06-15 NOTE — PLAN OF CARE
Problem: Adult Inpatient Plan of Care  Goal: Plan of Care Review  Outcome: Ongoing, Progressing  Goal: Patient-Specific Goal (Individualized)  Outcome: Ongoing, Progressing  Goal: Absence of Hospital-Acquired Illness or Injury  Outcome: Ongoing, Progressing  Intervention: Identify and Manage Fall Risk  Recent Flowsheet Documentation  Taken 6/15/2024 0600 by Angeles Kendrick LPN  Safety Promotion/Fall Prevention:   activity supervised   assistive device/personal items within reach  Taken 6/15/2024 0246 by Angeles Kendrick LPN  Safety Promotion/Fall Prevention:   assistive device/personal items within reach   activity supervised  Intervention: Prevent Infection  Recent Flowsheet Documentation  Taken 6/15/2024 0600 by Angeles Kendrick LPN  Infection Prevention:   environmental surveillance performed   equipment surfaces disinfected  Goal: Optimal Comfort and Wellbeing  Outcome: Ongoing, Progressing  Goal: Readiness for Transition of Care  Outcome: Ongoing, Progressing     Problem: Skin Injury Risk Increased  Goal: Skin Health and Integrity  Outcome: Ongoing, Progressing     Problem: COPD (Chronic Obstructive Pulmonary Disease) Comorbidity  Goal: Maintenance of COPD Symptom Control  Outcome: Ongoing, Progressing  Intervention: Maintain COPD-Symptom Control  Recent Flowsheet Documentation  Taken 6/15/2024 0600 by Angeles Kendrick LPN  Medication Review/Management: medications reviewed  Taken 6/15/2024 0246 by Angeles Kendrick LPN  Medication Review/Management: medications reviewed     Problem: Diabetes Comorbidity  Goal: Blood Glucose Level Within Targeted Range  Outcome: Ongoing, Progressing     Problem: Hypertension Comorbidity  Goal: Blood Pressure in Desired Range  Outcome: Ongoing, Progressing  Intervention: Maintain Blood Pressure Management  Recent Flowsheet Documentation  Taken 6/15/2024 0600 by Angeles Kendrick LPN  Medication Review/Management: medications reviewed  Taken 6/15/2024 0246 by Angeles Kendrick  LPN  Medication Review/Management: medications reviewed     Problem: Obstructive Sleep Apnea Risk or Actual Comorbidity Management  Goal: Unobstructed Breathing During Sleep  Outcome: Ongoing, Progressing   Goal Outcome Evaluation:

## 2024-06-15 NOTE — THERAPY EVALUATION
Patient Name: Matthieu Hawk  : 1960    MRN: [id number]                              Today's Date: 6/15/2024       Admit Date: 2024    Visit Dx:     ICD-10-CM ICD-9-CM   1. Weakness  R53.1 780.79   2. Renal insufficiency  N28.9 593.9   3. Uncontrolled type 2 diabetes mellitus with hyperglycemia  E11.65 250.02     Patient Active Problem List   Diagnosis    TIA (transient ischemic attack)     History reviewed. No pertinent past medical history.  History reviewed. No pertinent surgical history.   General Information       Row Name 06/15/24 1001          Physical Therapy Time and Intention    Document Type evaluation  -     Mode of Treatment physical therapy  -       Row Name 06/15/24 1001          General Information    Prior Level of Function independent:;all household mobility;min assist:;ADL's  pt reports spouse assists with ADLs. Uses a cane for household ambulation and wheelchair for community. Does not use home O2  -     Existing Precautions/Restrictions fall;oxygen therapy device and L/min  4.5L O2 via NC  -     Barriers to Rehab medically complex;previous functional deficit  -       Row Name 06/15/24 1001          Living Environment    People in Home spouse  -[facility] Name 06/15/24 1001          Home Main Entrance    Number of Stairs, Main Entrance none  -[facility] Name 06/15/24 1001          Stairs Within Home, Primary    Number of Stairs, Within Home, Primary none  -[facility] Name 06/15/24 1001          Cognition    Orientation Status (Cognition) oriented x 3  -[facility] Name 06/15/24 1001          Safety Issues, Functional Mobility    Impairments Affecting Function (Mobility) balance;endurance/activity tolerance;strength;sensation/sensory awareness;coordination;shortness of breath  -               User Key  (r) = Recorded By, (t) = Taken By, (c) = Cosigned By      Initials Name Provider Type    Lisa De Los Santos PT Physical Therapist                   Mobility        Row Name 06/15/24 1003          Bed Mobility    Bed Mobility supine-sit-supine;scooting/bridging  -     Scooting/Bridging [place] (Bed Mobility) maximum assist (25% patient effort);2 person assist  -     Supine-Sit-Supine [place] (Bed Mobility) moderate assist (50% patient effort)  -MARCOS       Row Name 06/15/24 1003          Transfers    Comment, (Transfers) Unable to assess transfers at this time due to patient fatigue. Pt reports increasing headache and lightheadedness at EOB. /88, , SpO2 91% on 4.5L  -               User Key  (r) = Recorded By, (t) = Taken By, (c) = Cosigned By      Initials Name Provider Type    Lisa De Los Santos PT Physical Therapist                   Obj/Interventions       [facility] Name 06/15/24 1006          Range of Motion Comprehensive    General Range of Motion bilateral lower extremity ROM WFL  -MARCOS       Row Name 06/15/24 1006          Strength Comprehensive (MMT)    General Manual Muscle Testing (MMT) Assessment lower extremity strength deficits identified  -     Comment, General Manual Muscle Testing (MMT) Assessment LLE grossly 2+/5, RLE grossly 3-/5  -MARCOS       Row Name 06/15/24 1006          Balance    Balance Assessment sitting static balance;sitting dynamic balance  -     Static Sitting Balance contact guard  -     Dynamic Sitting Balance contact guard;minimal assist  -     Position, Sitting Balance sitting edge of bed  -MARCOS       Row Name 06/15/24 1006          Sensory Assessment (Somatosensory)    Sensory Assessment (Somatosensory) other (see comments)  pt reports diabetic peripheral neuropathy and decreased LLE sensation to light touch  -               User Key  (r) = Recorded By, (t) = Taken By, (c) = Cosigned By      Initials Name Provider Type    Lisa De Los Santos, PT Physical Therapist                   Goals/Plan       Row Name 06/15/24 1008          Bed Mobility Goal 1 (PT)    Activity/Assistive Device (Bed Mobility Goal 1, PT) bed mobility  activities, all  -     [place] Level/Cues Needed (Bed Mobility Goal 1, PT) supervision required  -     Time Frame (Bed Mobility Goal 1, PT) long term goal (LTG);2 weeks  -       Row Name 06/15/24 1008          Transfer Goal 1 (PT)    Activity/Assistive Device (Transfer Goal 1, PT) sit-to-stand/stand-to-sit;bed-to-chair/chair-to-bed  -     [place] Level/Cues Needed (Transfer Goal 1, PT) minimum assist (75% or more patient effort)  -     Time Frame (Transfer Goal 1, PT) long term goal (LTG);2 weeks  -       Row Name 06/15/24 1008          Problem Specific Goal 1 (PT)    Problem Specific Goal 1 (PT) Pt will demonstrate static standing at EOB with CGA and use of AD [id number] to improve functional transfers.  -     Time Frame (Problem Specific Goal 1, PT) long-term goal (LTG);2 weeks  -       Row Name 06/15/24 1008          Therapy Assessment/Plan (PT)    Planned Therapy Interventions (PT) balance training;bed mobility training;gait training;home exercise program;patient/family education;strengthening;neuromuscular re-education;transfer training  -               User Key  (r) = Recorded By, (t) = Taken By, (c) = Cosigned By      Initials Name Provider Type    Lisa De Los Santos, PT Physical Therapist                   Clinical Impression       Row Name 06/15/24 1007          Pain    Pretreatment Pain Rating 0/10 - no pain  -     Posttreatment Pain Rating 0/10 - no pain  -       Row Name 06/15/24 1007          Plan of Care Review    Plan of Care Reviewed With patient  -     Outcome Evaluation Pt is a 62 y/o male who presents to [facility] with sensation deficits in the LLE and weakness. Pt here for possible TIA and worsening diabetic neuropathy. MRI brain (-) and CT angio head and neck (-) for large vessel occlusion. PMH significant for morbid obesity, HTN, diabetic peripheral neuropathy, and chronic low back pain. At [facility] pt uses a cane and wheelchair for mobility. He reports multiple falls. Pt  does not utilize home O2, but at this time is requiring 5L. He presents with decreased LLE strength and sensation deficits compared to the R as well as LUE strength deficits. Pt becomes dizzy and reports having a headache when coming to the EOB and has difficulty maintaining eyes open. BP WNL: supine 145/79, seated /88, and supine 150/80. Pt reports improved symptoms once returned to supine. He requires mod A for bed mobility and noted 3-/5 RLE, 2+/5 LLE strength grossly. Pt was given stroke prevention handout and given education regarding stroke signs/symptoms. Pt will need acute inpatient rehab at d/c.  -       Row Name 06/15/24 1007          Therapy Assessment/Plan (PT)    Rehab Potential (PT) good, to achieve stated therapy goals  -     Criteria for Skilled Interventions Met (PT) yes;meets criteria  -     Therapy Frequency (PT) 5 times/wk  -     Predicted Duration of Therapy Intervention (PT) until d/c  -       Row Name 06/15/24 1007          Vital Signs    Pre Systolic BP Rehab 145  -MARCOS     Pre Treatment Diastolic BP 79  -MARCOS     Intra Systolic BP Rehab 150  -MARCOS     Intra Treatment Diastolic BP 88  -MARCOS     Post Systolic BP Rehab 150  -MARCOS     Post Treatment Diastolic BP 80  -MARCOS     Pre SpO2 (%) 95  -MARCOS     O2 Delivery Pre Treatment supplemental O2  -MARCOS     Intra SpO2 (%) 91  -MARCOS     O2 Delivery Intra Treatment supplemental O2  -MARCOS     Post SpO2 (%) 92  -MARCOS     O2 Delivery Post Treatment supplemental O2  4.5L  -MARCOS       Row Name 06/15/24 1007          Positioning and Restraints    Pre-Treatment Position in bed  -MARCOS     Post Treatment Position bed  -MARCOS     In Bed notified nsg;fowlers;call light within reach;encouraged to call for assist;exit alarm on  -MARCOS               User Key  (r) = Recorded By, (t) = Taken By, (c) = Cosigned By      Initials Name Provider Type    Lisa De Los Santos, PT Physical Therapist                   Outcome Measures       Row Name 06/15/24 1009 06/15/24 0301       How much  help from another person do you currently need...    Turning from your back to your side while in flat bed without using bedrails? 3  -MARCOS 4  -KB    Moving from lying on back to sitting on the side of a flat bed without bedrails? 3  -MARCOS 4  -KB    Moving to and from a bed to a chair (including a wheelchair)? 2  -MARCOS 3  -KB    Standing up from a chair using your arms (e.g., wheelchair, bedside chair)? 2  -MARCOS 3  -KB    Climbing 3-5 steps with a railing? 1  -MARCOS 2  -KB    To walk in hospital room? 2  -MARCOS 3  -KB    AM-PAC 6 Clicks Score (PT) 13  -MARCOS 19  -KB    Highest Level of Mobility Goal 4 --> Transfer to chair/commode  - 6 --> Walk 10 steps or more  -      Row Name 06/15/24 1009          Modified [place] Scale    Pre-Stroke Modified [place] Scale 6 - Unable to determine (UTD) from the medical record documentation  -     Modified [place] Scale 4 - Moderately severe disability.  Unable to walk without assistance, and unable to attend to own bodily needs without assistance.  -       Row Name 06/15/24 1009          Functional Assessment    Outcome Measure Options AM-PAC 6 Clicks Basic Mobility (PT);Modified [place]  -               User Key  (r) = Recorded By, (t) = Taken By, (c) = Cosigned By      Initials Name Provider Type    Lisa De Los Santos, PT Physical Therapist    Phylicia Weston, RN Registered Nurse                                 Physical Therapy Education       Title: PT OT SLP Therapies (In Progress)       Topic: Physical Therapy (In Progress)       Point: Mobility training (Done)       Learning Progress Summary             Patient Acceptance, E,TB, VU by MARCOS at 6/15/2024 1010                         Point: Home exercise program (Not Started)       Learner Progress:  Not documented in this visit.              Point: Body mechanics (Done)       Learning Progress Summary             Patient Acceptance, E,TB, VU by MARCOS at 6/15/2024 1010                         Point: Precautions (Done)       Learning  Progress Summary             Patient Acceptance, E,TB, VU by  at 6/15/2024 1010                                         User Key       Initials Effective Dates Name Provider Type Discipline     08/23/21 -  Lisa Fuller, PT Physical Therapist PT                  PT Recommendation and Plan  Planned Therapy Interventions (PT): balance training, bed mobility training, gait training, home exercise program, patient/family education, strengthening, neuromuscular re-education, transfer training  Plan of Care Reviewed With: patient  Outcome Evaluation: Pt is a 62 y/o male who presents to [facility] with sensation deficits in the LLE and weakness. Pt here for possible TIA and worsening diabetic neuropathy. MRI brain (-) and CT angio head and neck (-) for large vessel occlusion. PMH significant for morbid obesity, HTN, diabetic peripheral neuropathy, and chronic low back pain. At [facility] pt uses a cane and wheelchair for mobility. He reports multiple falls. Pt does not utilize home O2, but at this time is requiring 5L. He presents with decreased LLE strength and sensation deficits compared to the R as well as LUE strength deficits. Pt becomes dizzy and reports having a headache when coming to the EOB and has difficulty maintaining eyes open. BP WNL: supine 145/79, seated /88, and supine 150/80. Pt reports improved symptoms once returned to supine. He requires mod A for bed mobility and noted 3-/5 RLE, 2+/5 LLE strength grossly. Pt was given stroke prevention handout and given education regarding stroke signs/symptoms. Pt will need acute inpatient rehab at d/c.     Time Calculation:         PT Charges       Row Name 06/15/24 1011             Time Calculation    Start Time 0825  -      Stop Time 0858  -      Time Calculation (min) 33 min  -MARCOS      PT Received On 06/15/24  -MARCOS      PT - Next Appointment 06/17/24  -      PT Goal Re-Cert Due Date 06/29/24  -                User Key  (r) = Recorded By, (t) = Taken By,  (c) = Cosigned By      Initials Name Provider Type    Lisa De Los Santos, PT Physical Therapist                  Therapy Charges for Today       Code Description Service Date Service Provider Modifiers Qty    [id number] HC PT EVAL MOD COMPLEXITY 4 6/15/2024 Lisa Fuller, PT GP 1            PT G-Codes  Outcome Measure Options: AM-PAC 6 Clicks Basic Mobility (PT), Modified [place]  AM-PAC 6 Clicks Score (PT): 13  Modified [place] Scale: 4 - Moderately severe disability.  Unable to walk without assistance, and unable to attend to own bodily needs without assistance.  PT Discharge Summary  Anticipated Discharge Disposition (PT): inpatient rehabilitation facility    Lisa Fuller PT  6/15/2024

## 2024-06-15 NOTE — PLAN OF CARE
Goal Outcome Evaluation:                 Problem: Adult Inpatient Plan of Care  Goal: Plan of Care Review  Outcome: Ongoing, Progressing  Goal: Patient-Specific Goal (Individualized)  Outcome: Ongoing, Progressing  Goal: Absence of Hospital-Acquired Illness or Injury  Outcome: Ongoing, Progressing  Intervention: Identify and Manage Fall Risk  Recent Flowsheet Documentation  Taken 6/15/2024 1600 by Ronal Shane RN  Safety Promotion/Fall Prevention:   activity supervised   assistive device/personal items within reach   clutter free environment maintained   fall prevention program maintained   nonskid shoes/slippers when out of bed   room organization consistent   safety round/check completed  Taken 6/15/2024 1500 by Ronal Shane RN  Safety Promotion/Fall Prevention:   activity supervised   assistive device/personal items within reach   clutter free environment maintained   fall prevention program maintained   nonskid shoes/slippers when out of bed   room organization consistent   safety round/check completed  Taken 6/15/2024 1400 by Ronal Shane RN  Safety Promotion/Fall Prevention:   activity supervised   assistive device/personal items within reach   clutter free environment maintained   fall prevention program maintained   nonskid shoes/slippers when out of bed   room organization consistent   safety round/check completed  Taken 6/15/2024 1200 by Ronal Shane RN  Safety Promotion/Fall Prevention:   activity supervised   assistive device/personal items within reach   clutter free environment maintained   fall prevention program maintained   nonskid shoes/slippers when out of bed   room organization consistent   safety round/check completed  Taken 6/15/2024 1008 by Ronal Shane RN  Safety Promotion/Fall Prevention:   activity supervised   assistive device/personal items within reach   clutter free environment maintained   fall prevention program maintained   nonskid shoes/slippers when out of  bed   room organization consistent   safety round/check completed  Taken 6/15/2024 0900 by Ronal Shane RN  Safety Promotion/Fall Prevention:   activity supervised   assistive device/personal items within reach   clutter free environment maintained   fall prevention program maintained   nonskid shoes/slippers when out of bed   room organization consistent   safety round/check completed  Taken 6/15/2024 0800 by Ronal Shane RN  Safety Promotion/Fall Prevention:   activity supervised   assistive device/personal items within reach   clutter free environment maintained   fall prevention program maintained   nonskid shoes/slippers when out of bed   room organization consistent   safety round/check completed  Intervention: Prevent Skin Injury  Recent Flowsheet Documentation  Taken 6/15/2024 1600 by Ronal Shane RN  Body Position: weight shifting  Skin Protection:   adhesive use limited   incontinence pads utilized   transparent dressing maintained   tubing/devices free from skin contact  Taken 6/15/2024 1200 by Ronal Shane RN  Body Position: position changed independently  Skin Protection:   adhesive use limited   incontinence pads utilized   transparent dressing maintained   tubing/devices free from skin contact  Taken 6/15/2024 0800 by Ronal Shane RN  Body Position: position changed independently  Skin Protection:   adhesive use limited   incontinence pads utilized   transparent dressing maintained   tubing/devices free from skin contact  Intervention: Prevent and Manage VTE (Venous Thromboembolism) Risk  Recent Flowsheet Documentation  Taken 6/15/2024 1600 by Ronal Shane RN  Activity Management: activity encouraged  VTE Prevention/Management:   bilateral   sequential compression devices on  Range of Motion: active ROM (range of motion) encouraged  Taken 6/15/2024 1200 by Ronal Shane RN  Activity Management: activity encouraged  Taken 6/15/2024 0800 by Ronal Shane  RN  Activity Management: activity encouraged  Intervention: Prevent Infection  Recent Flowsheet Documentation  Taken 6/15/2024 1600 by Ronal Shane RN  Infection Prevention:   environmental surveillance performed   hand hygiene promoted   rest/sleep promoted   single patient room provided  Taken 6/15/2024 1500 by Ronal Shane RN  Infection Prevention:   environmental surveillance performed   hand hygiene promoted   rest/sleep promoted   single patient room provided  Taken 6/15/2024 1400 by Ronal Shane RN  Infection Prevention:   environmental surveillance performed   hand hygiene promoted   rest/sleep promoted   single patient room provided  Taken 6/15/2024 1200 by Ronal Shane RN  Infection Prevention:   environmental surveillance performed   hand hygiene promoted   rest/sleep promoted   single patient room provided  Taken 6/15/2024 1008 by Ronal Shane RN  Infection Prevention:   environmental surveillance performed   hand hygiene promoted   rest/sleep promoted   single patient room provided  Taken 6/15/2024 0900 by Ronal Shane RN  Infection Prevention:   environmental surveillance performed   hand hygiene promoted   rest/sleep promoted   single patient room provided  Taken 6/15/2024 0800 by Ronal Shane RN  Infection Prevention:   environmental surveillance performed   hand hygiene promoted   rest/sleep promoted   single patient room provided  Goal: Optimal Comfort and Wellbeing  Outcome: Ongoing, Progressing  Intervention: Monitor Pain and Promote Comfort  Recent Flowsheet Documentation  Taken 6/15/2024 0938 by Ronal Shane RN  Pain Management Interventions:   see MAR   position adjusted  Intervention: Provide Person-Centered Care  Recent Flowsheet Documentation  Taken 6/15/2024 1600 by Ronal Shane RN  Trust Relationship/Rapport:   care explained   empathic listening provided   emotional support provided   choices provided   reassurance provided   questions  encouraged   questions answered   thoughts/feelings acknowledged  Taken 6/15/2024 1200 by Ronal Shane RN  Trust Relationship/Rapport:   choices provided   care explained   emotional support provided   empathic listening provided   questions encouraged   reassurance provided   thoughts/feelings acknowledged   questions answered  Taken 6/15/2024 0800 by Ronal Shane RN  Trust Relationship/Rapport:   care explained   choices provided   emotional support provided   empathic listening provided   questions answered   questions encouraged   reassurance provided   thoughts/feelings acknowledged  Goal: Readiness for Transition of Care  Outcome: Ongoing, Progressing     Problem: Skin Injury Risk Increased  Goal: Skin Health and Integrity  Outcome: Ongoing, Progressing  Intervention: Optimize Skin Protection  Recent Flowsheet Documentation  Taken 6/15/2024 1600 by Ronal Shane RN  Pressure Reduction Techniques:   frequent weight shift encouraged   weight shift assistance provided  Head of Bed ([facility]) Positioning: [facility] elevated  Pressure Reduction Devices: pressure-redistributing mattress utilized  Skin Protection:   adhesive use limited   incontinence pads utilized   transparent dressing maintained   tubing/devices free from skin contact  Taken 6/15/2024 1200 by Ronal Shane RN  Pressure Reduction Techniques:   frequent weight shift encouraged   weight shift assistance provided  Head of Bed ([facility]) Positioning: [facility] elevated  Pressure Reduction Devices: pressure-redistributing mattress utilized  Skin Protection:   adhesive use limited   incontinence pads utilized   transparent dressing maintained   tubing/devices free from skin contact  Taken 6/15/2024 0800 by Ronal Shane RN  Pressure Reduction Techniques:   frequent weight shift encouraged   weight shift assistance provided  Head of Bed ([facility]) Positioning: [facility] elevated  Pressure Reduction Devices: pressure-redistributing mattress utilized  Skin  Protection:   adhesive use limited   incontinence pads utilized   transparent dressing maintained   tubing/devices free from skin contact     Problem: COPD (Chronic Obstructive Pulmonary Disease) Comorbidity  Goal: Maintenance of COPD Symptom Control  Outcome: Ongoing, Progressing  Intervention: Maintain COPD-Symptom Control  Recent Flowsheet Documentation  Taken 6/15/2024 1600 by Ronal Shane RN  Supportive Measures: active listening utilized  Medication Review/Management: medications reviewed  Taken 6/15/2024 1500 by Ronal Shane RN  Medication Review/Management: medications reviewed  Taken 6/15/2024 1400 by Ronal Shane RN  Medication Review/Management: medications reviewed  Taken 6/15/2024 1200 by Ronal Shane RN  Supportive Measures: active listening utilized  Medication Review/Management: medications reviewed  Taken 6/15/2024 1008 by Roanl Shane RN  Medication Review/Management: medications reviewed  Taken 6/15/2024 0900 by Ronal Shane RN  Medication Review/Management: medications reviewed  Taken 6/15/2024 0800 by Ronal Shane RN  Supportive Measures: active listening utilized  Medication Review/Management: medications reviewed     Problem: Diabetes Comorbidity  Goal: Blood Glucose Level Within Targeted Range  Outcome: Ongoing, Progressing  Intervention: Monitor and Manage Glycemia  Recent Flowsheet Documentation  Taken 6/15/2024 1600 by Ronal Shane RN  Glycemic Management: blood glucose monitored  Taken 6/15/2024 1200 by Ronal Shane RN  Glycemic Management: blood glucose monitored  Taken 6/15/2024 0800 by Ronal Shane RN  Glycemic Management: blood glucose monitored     Problem: Hypertension Comorbidity  Goal: Blood Pressure in Desired Range  Outcome: Ongoing, Progressing  Intervention: Maintain Blood Pressure Management  Recent Flowsheet Documentation  Taken 6/15/2024 1600 by Ronal Shane RN  Medication Review/Management: medications reviewed  Taken  6/15/2024 1500 by Ronal Shane RN  Medication Review/Management: medications reviewed  Taken 6/15/2024 1400 by Ronal Shane RN  Medication Review/Management: medications reviewed  Taken 6/15/2024 1200 by Ronal Shane RN  Medication Review/Management: medications reviewed  Taken 6/15/2024 1008 by Ronal Shane RN  Medication Review/Management: medications reviewed  Taken 6/15/2024 0900 by Ronal Shane RN  Medication Review/Management: medications reviewed  Taken 6/15/2024 0800 by Ronal Shane RN  Medication Review/Management: medications reviewed     Problem: Obstructive Sleep Apnea Risk or Actual Comorbidity Management  Goal: Unobstructed Breathing During Sleep  Outcome: Ongoing, Progressing

## 2024-06-15 NOTE — ED PROVIDER NOTES
"Subjective   History of Present Illness  63-year-old male presents by EMS with reports of some left leg weakness and difficulty ambulating started around 4 PM today.  He reports no trauma or blurry vision or speech difficulties.  He reports no leg pain or pallor or swelling  Review of Systems    No past medical history on file.    Allergies   Allergen Reactions    Codeine Itching and Rash       No past surgical history on file.    No family history on file.    Social History     Socioeconomic History    Marital status:      Prior to Admission medications    Not on File     /79   Pulse 96   Temp 97.7 °F (36.5 °C) (Oral)   Resp 18   Ht 177.8 cm (70\")   Wt (!) 168 kg (370 lb)   SpO2 93%   BMI 53.09 kg/m²         Objective   Physical Exam  General: Morbidly obese male awake and alert, no acute distress  Eyes: Pupils round and reactive, sclera nonicteric  HEENT: Mucous membranes moist, no mucosal swelling  Neck: Supple, no nuchal rigidity,   Respirations: Respirations nonlabored, equal breath sounds bilaterally, clear lungs  Heart regular rate and rhythm, no murmurs rubs or gallops,   Abdomen soft nontender nondistended,   Extremities no clubbing cyanosis or edema, calves are symmetric and nontender, normal pulses in the extremities  Neuro cranial nerves II through XII intact , normal sensory/motor function and strength in four extremities, no slurred speech, no facial droop, normal finger to nose, some weakness in the bilateral lower extremities, seems symmetric on exam  Psych oriented, pleasant affect  Skin no rash, brisk cap refill  Procedures           ED Course  ED Course as of 06/14/24 1255   Fri Jun 14, 2024   2133 Code stroke was initiated after evaluation at the ambulance bay.  Notably last known well was reported at 4 PM today.  Patient was not a thrombolytic candidate due to the delay in presentation.  He was sent to CT imaging for further rule out of CVA and LVO. [SH]      ED Course User " Index  [SH] Scott Kiran MD                          Total (NIH Stroke Scale): 1        Results for orders placed or performed during the hospital encounter of 06/14/24   Comprehensive Metabolic Panel    Specimen: Blood   Result Value Ref Range    Glucose 339 (H) 65 - 99 mg/dL    BUN 30 (H) 8 - 23 mg/dL    Creatinine 2.10 (H) 0.76 - 1.27 mg/dL    Sodium 134 (L) 136 - 145 mmol/L    Potassium 4.5 3.5 - 5.2 mmol/L    Chloride 92 (L) 98 - 107 mmol/L    CO2 29.8 (H) 22.0 - 29.0 mmol/L    Calcium 9.4 8.6 - 10.5 mg/dL    Total Protein 7.3 6.0 - 8.5 g/dL    Albumin 4.2 3.5 - 5.2 g/dL    ALT (SGPT) 9 1 - 41 U/L    AST (SGOT) 14 1 - 40 U/L    Alkaline Phosphatase 96 39 - 117 U/L    Total Bilirubin 0.2 0.0 - 1.2 mg/dL    Globulin 3.1 gm/dL    A/G Ratio 1.4 g/dL    BUN/Creatinine Ratio 14.3 7.0 - 25.0    Anion Gap 12.2 5.0 - 15.0 mmol/L    eGFR 34.5 (L) >60.0 mL/min/1.73   Protime-INR    Specimen: Blood   Result Value Ref Range    Protime 10.1 9.6 - 11.7 Seconds    INR <0.93 (L) 0.93 - 1.10   aPTT    Specimen: Blood   Result Value Ref Range    PTT 27.5 24.0 - 31.0 seconds   Single High Sensitivity Troponin T    Specimen: Blood   Result Value Ref Range    HS Troponin T 27 (H) <22 ng/L   CBC Auto Differential    Specimen: Blood   Result Value Ref Range    WBC 11.02 (H) 3.40 - 10.80 10*3/mm3    RBC 4.47 4.14 - 5.80 10*6/mm3    Hemoglobin 12.7 (L) 13.0 - 17.7 g/dL    Hematocrit 39.5 37.5 - 51.0 %    MCV 88.4 79.0 - 97.0 fL    MCH 28.4 26.6 - 33.0 pg    MCHC 32.2 31.5 - 35.7 g/dL    RDW 13.5 12.3 - 15.4 %    RDW-SD 43.9 37.0 - 54.0 fl    MPV 10.1 6.0 - 12.0 fL    Platelets 403 140 - 450 10*3/mm3    Neutrophil % 64.2 42.7 - 76.0 %    Lymphocyte % 25.1 19.6 - 45.3 %    Monocyte % 8.3 5.0 - 12.0 %    Eosinophil % 1.5 0.3 - 6.2 %    Basophil % 0.5 0.0 - 1.5 %    Immature Grans % 0.4 0.0 - 0.5 %    Neutrophils, Absolute 7.08 (H) 1.70 - 7.00 10*3/mm3    Lymphocytes, Absolute 2.77 0.70 - 3.10 10*3/mm3    Monocytes, Absolute 0.91 (H)  0.10 - 0.90 10*3/mm3    Eosinophils, Absolute 0.16 0.00 - 0.40 10*3/mm3    Basophils, Absolute 0.06 0.00 - 0.20 10*3/mm3    Immature Grans, Absolute 0.04 0.00 - 0.05 10*3/mm3    nRBC 0.0 0.0 - 0.2 /100 WBC   POC Glucose Once    Specimen: Blood   Result Value Ref Range    Glucose 336 (H) 70 - 105 mg/dL   ECG 12 Lead Stroke Evaluation   Result Value Ref Range    QT Interval 405 ms    QTC Interval 509 ms   Type & Screen    Specimen: Arm, Left; Blood   Result Value Ref Range    ABO Type O     RH type Positive     Antibody Screen Negative     T&S Expiration Date 6/17/2024 11:59:59 PM    Green Top (Gel)   Result Value Ref Range    Extra Tube Hold for add-ons.    Lavender Top   Result Value Ref Range    Extra Tube hold for add-on    Gold Top - SST   Result Value Ref Range    Extra Tube Hold for add-ons.    Light Blue Top   Result Value Ref Range    Extra Tube Hold for add-ons.      XR Chest 1 View    Result Date: 6/14/2024  Impression: No acute cardiopulmonary disease. Electronically Signed: Zenon Rodriguez MD  6/14/2024 10:31 PM EDT  Workstation ID: [id number]    CT Angiogram Head w AI Analysis of LVO    Result Date: 6/14/2024  1.No hemodynamically significant stenosis, large vessel occlusion or aneurysms in intracranial circulation 2.No hemodynamically significant stenosis, dissection or aneurysms in extracranial circulation. Electronically Signed: Ben Jay MD  6/14/2024 10:03 PM EDT  Workstation ID: [id number]    CT Angiogram Neck    Result Date: 6/14/2024  1.No hemodynamically significant stenosis, large vessel occlusion or aneurysms in intracranial circulation 2.No hemodynamically significant stenosis, dissection or aneurysms in extracranial circulation. Electronically Signed: Ben Jay MD  6/14/2024 10:03 PM EDT  Workstation ID: [id number]    CT CEREBRAL PERFUSION WITH & WITHOUT CONTRAST    Result Date: 6/14/2024  Negative for completed infarct or ischemic penumbra. Electronically Signed: Ben Jay MD   6/14/2024 9:55 PM EDT  Workstation ID: [id number]    CT Head Without Contrast Stroke Protocol    Result Date: 6/14/2024  Impression: Negative for acute intracranial hemorrhage, large territory infarct, midline shift or hydrocephalus. Electronically Signed: Ben Jay MD  6/14/2024 9:27 PM EDT  Workstation ID: [id number]             Medical Decision Making  Differential diagnosis including CVA, seizure, metabolic encephalopathy, limb ischemia, lumbar radiculopathy    Patient was evaluated code stroke for possible CVA or large vessel occlusion.  CT evaluation was negative for large vessel occlusion or hemorrhage.  Patient was ordered aspirin and Plavix by the neurology consultant.  Patient may neurologically stable throughout the emergency room course with NIH of 1.  He was advised of findings and agreeable plan of admission.  MRI of the brain ordered and pending.  Case discussed with Dr. Corral with the hospitalist service who is agreeable plan of admission with neurology consultation.    Amount and/or Complexity of Data Reviewed  Labs: ordered. Decision-making details documented in ED Course.     Details: CBC some mild leukocytosis, mild anemia, high-sensitivity troponin in the indeterminate range, comprehensive metabolic panel showing some renal insufficiency and hyperglycemia  Radiology: ordered and independent interpretation performed.     Details: My independent interpretation of CT head image no apparent acute hemorrhage  ECG/medicine tests: ordered and independent interpretation performed.     Details: My EKG interpretation sinus rhythm, right bundle branch block, no previous available for comparison    Risk  Prescription drug management.        Final diagnoses:   Weakness   Renal insufficiency   Uncontrolled type 2 diabetes mellitus with hyperglycemia       ED Disposition  ED Disposition       ED Disposition   Decision to Admit    Condition   --    Comment   Level of Care: Telemetry [5]   Admitting  Physician: LLANES ALVAREZ, CARLOS [[id number]]   Attending Physician: LLANES ALVAREZ, CARLOS [[id number]]   Bed Request Comments: raul                 No follow-up provider specified.       Medication List      No changes were made to your prescriptions during this visit.            Scott Kiran MD  06/14/24 5279       Scott Kiran MD  06/14/24 4522

## 2024-06-15 NOTE — PROGRESS NOTES
[facility] MEDICINE SERVICE  DAILY PROGRESS NOTE    NAME: Matthieu Hawk  : 1960  MRN: [id number]      LOS: 0 days     PROVIDER OF SERVICE: Siva Brennan MD    Chief Complaint: TIA (transient ischemic attack)    Subjective:     Interval History:  History taken from: patient  Patient Complaints: Patient denies any complaints today and he feels essentially back to normal other than ongoing bilateral lower extremity weakness.  Initial stroke workup has been negative.  Will go ahead and get MRI of the thoracic and lumbar spine.      Review of Systems:   Review of system done and negative    Objective:     Vital Signs  Temp:  [97.6 °F (36.4 °C)-98.8 °F (37.1 °C)] 98.8 °F (37.1 °C)  Heart Rate:  [] 91  Resp:  [13-20] 13  BP: ()/(57-95) 124/80  Flow (L/min):  [2-3] 3   Body mass index is 53.09 kg/m².    Physical Exam  General: NAD  HEENT: PERRL  CV: Normal S1, S2  Respiratory: CTAB  Abdomen: Soft, nontender, nondistended  Neuro: Alert oriented x 3, motor strength is grossly intact in the upper extremities and noted to have more strength of 4/5 in the bilateral lower extremities.  Also noted to have decreased sensation in left lower extremity. Cranial nerves intact.    Scheduled Meds   amLODIPine, 5 mg, Oral, Q24H  aspirin, 81 mg, Oral, Daily   Or  aspirin, 300 mg, Rectal, Daily  atorvastatin, 80 mg, Oral, Nightly  carvedilol, 12.5 mg, Oral, BID With Meals  clopidogrel, 75 mg, Oral, Daily  DULoxetine, 60 mg, Oral, Q12H  finasteride, 5 mg, Oral, Daily  gabapentin, 300 mg, Oral, TID  gadoteridol, 20 mL, Intravenous, Once in imaging  heparin (porcine), 5,000 Units, Subcutaneous, Q8H  insulin glargine, 10 Units, Subcutaneous, Q12H  insulin lispro, 2-9 Units, Subcutaneous, 4x Daily AC & at Bedtime  sodium chloride, 10 mL, Intravenous, Q12H  sodium chloride, 10 mL, Intravenous, Q12H       PRN Meds     acetaminophen **OR** acetaminophen    aluminum-magnesium hydroxide-simethicone    senna-docusate  sodium **AND** polyethylene glycol **AND** bisacodyl **AND** bisacodyl    Calcium Replacement - Follow Nurse / BPA Driven Protocol    dextrose    dextrose    glucagon (human recombinant)    HYDROcodone-acetaminophen    Magnesium Standard Dose Replacement - Follow Nurse / BPA Driven Protocol    ondansetron    Phosphorus Replacement - Follow Nurse / BPA Driven Protocol    Potassium Replacement - Follow Nurse / BPA Driven Protocol    sodium chloride    sodium chloride    sodium chloride    sodium chloride    sodium chloride   Infusions  lactated ringers, 75 mL/hr, Last Rate: 75 mL/hr (06/15/24 0557)          Diagnostic Data    Results from last 7 days   Lab Units 06/15/24  0340   WBC 10*3/mm3 10.80   HEMOGLOBIN g/dL 12.7*   HEMATOCRIT % 40.4   PLATELETS 10*3/mm3 416   GLUCOSE mg/dL 322*   CREATININE mg/dL 1.76*   BUN mg/dL 32*   SODIUM mmol/L 134*   POTASSIUM mmol/L 4.9   AST (SGOT) U/L 17   ALT (SGPT) U/L 8   ALK PHOS U/L 91   BILIRUBIN mg/dL 0.2   ANION GAP mmol/L 11.7       US Renal Bilateral    Result Date: 6/15/2024  Impression: Normal ultrasound appearance of the kidneys with no obstructive uropathy Electronically Signed: José Miguel Muse  6/15/2024 7:29 AM EDT  Workstation ID: [id number]    MRI Brain Without Contrast    Result Date: 6/14/2024  Impression: 1. No acute intracranial abnormality. 2. Minimal chronic small vessel ischemic change. Electronically Signed: Scotty Rosario MD  6/14/2024 11:49 PM EDT  Workstation ID: [id number]    XR Chest 1 View    Result Date: 6/14/2024  Impression: No acute cardiopulmonary disease. Electronically Signed: Zenon Rodriguez MD  6/14/2024 10:31 PM EDT  Workstation ID: [id number]    CT Angiogram Head w AI Analysis of LVO    Result Date: 6/14/2024  1.No hemodynamically significant stenosis, large vessel occlusion or aneurysms in intracranial circulation 2.No hemodynamically significant stenosis, dissection or aneurysms in extracranial circulation. Electronically Signed: Ben Jay MD   6/14/2024 10:03 PM EDT  Workstation ID: [id number]    CT Angiogram Neck    Result Date: 6/14/2024  1.No hemodynamically significant stenosis, large vessel occlusion or aneurysms in intracranial circulation 2.No hemodynamically significant stenosis, dissection or aneurysms in extracranial circulation. Electronically Signed: Ben Jay MD  6/14/2024 10:03 PM EDT  Workstation ID: [id number]    CT CEREBRAL PERFUSION WITH & WITHOUT CONTRAST    Result Date: 6/14/2024  Negative for completed infarct or ischemic penumbra. Electronically Signed: Ben Jay MD  6/14/2024 9:55 PM EDT  Workstation ID: [id number]    CT Head Without Contrast Stroke Protocol    Result Date: 6/14/2024  Impression: Negative for acute intracranial hemorrhage, large territory infarct, midline shift or hydrocephalus. Electronically Signed: Ben Jay MD  6/14/2024 9:27 PM EDT  Workstation ID: [id number]       I reviewed the patient's new clinical results.    Assessment/Plan:     Active and Resolved Problems  Active Hospital Problems    Diagnosis  POA    **TIA (transient ischemic attack) [G45.9]  Yes      Resolved Hospital Problems   No resolved problems to display.     Possible TIA  Stroke rule out  Worsening diabetic neuropathy  Presented with B/L LE weakness.   CT angio head and neck in the ER did not show any large vessel occlusion  MRI brain negative for acute pathology  Status post loading dose with Plavix 300 mg and aspirin 300 mg in the ER  Continue dual antiplatelets 21 days  High intensity statin  With factor modification  Telemetry monitoring  Echo  Follow TSH, B12, lipid panel  PT OT eval  MRI of the T and L spine pending.     Hypertension  Start Norvasc 5 mg once daily  Holding losartan and HCTZ given his ENOC  Coreg 12.5 mg twice daily        History of major depressive disorder  Peripheral neuropathy  Continue Cymbalta and gabapentin home dose  Continue risperidone, trazodone, pending reconciliation     Chronic pain syndrome  Norco  as needed for moderate pain     ENOC, likely prerenal superimposed on underlying diabetic nephropathy  Start LR 75 mill per hour, reassess rate in the morning  Renal US     Type 2 diabetes  Insulin sliding scale       VTE Prophylaxis:  Pharmacologic & mechanical VTE prophylaxis orders are present.         Code status is   Code Status and Medical Interventions:   Ordered at: 06/15/24 0611     Level Of Support Discussed With:    Patient     Code Status (Patient has no pulse and is not breathing):    CPR (Attempt to Resuscitate)     Medical Interventions (Patient has pulse or is breathing):    Full Support       Plan for disposition:Needs rehab.     Time: 30 minutes    Signature: Electronically signed by Siva Brennan MD, 06/15/24, 14:53 EDT.  [facility] Hospitalist Team

## 2024-06-15 NOTE — PLAN OF CARE
Goal Outcome Evaluation:     Pt is a 64 y/o male who presents to [facility] with sensation deficits in the LLE and weakness. Pt here for possible TIA and worsening diabetic neuropathy. MRI brain (-) and CT angio head and neck (-) for large vessel occlusion. PMH significant for morbid obesity, HTN, diabetic peripheral neuropathy, and chronic low back pain. At [facility] pt uses a cane and wheelchair for mobility. He reports multiple falls. Pt does not utilize home O2, but at this time is requiring 5L. He presents with decreased LLE strength and sensation deficits compared to the R as well as LUE strength deficits. Pt becomes dizzy and reports having a headache when coming to the EOB and has difficulty maintaining eyes open. BP WNL: supine 145/79, seated /88, and supine 150/80. Pt reports improved symptoms once returned to supine. He requires mod A for bed mobility and noted 3-/5 RLE, 2+/5 LLE strength grossly. Pt was given stroke prevention handout and given education regarding stroke signs/symptoms. Pt will need acute inpatient rehab at d/c.

## 2024-06-15 NOTE — H&P
[facility] Medicine Services  History & Physical    Patient Name: Matthieu Hawk  : 1960  MRN: [id number]  Primary Care Physician:  Provider, No Known  Date of admission: 2024  Date and Time of Service: 2024 at 12:15 am    Subjective      Chief Complaint: Bilateral LE weakness, LLE numbness, decreased alertness, slurred speech.     History of Present Illness: Matthieu Hawk is a 63 y.o. male with a PMH of type 2 diabetes, diabetic neuropathy, hypertension, morbid obesity, MANA,GERD, asthma, chronic pain syndrome on opioids, major depressive disorder who presented to [facility] on 2024 with bilateral lower extremity weakness that has been ongoing for the past couple of days and left lower extremity numbness that he noticed today around noon.  History is obtained from patient at bedside and his wife however patient is a poor historian.  As per wife patient has also been having decreased alertness and slurred speech since earlier today.  Code stroke was called in the ER however patient was already out of the window for thrombolytic therapy.  Patient had a CT angio head and neck which did not show any large vessel occlusion.  Brain MRI did not show acute intracranial abnormality.  Chemistry labs in the ER notable for elevated creatinine 2.1, mild hyponatremia 134, troponin mildly elevated at bedtime 27.  CBC labs notable for mild elevated white blood cell 11.02, hemoglobin 12.7 otherwise unremarkable.  EKG in sinus rhythm.  The decision to admit was made.          Review of Systems   Constitutional:  Positive for fatigue. Negative for chills, diaphoresis and fever.   HENT:  Negative for dental problem, facial swelling, mouth sores, postnasal drip, sinus pain, sneezing and tinnitus.    Eyes:  Negative for photophobia and itching.   Respiratory:  Negative for cough, chest tightness and stridor.    Cardiovascular:  Negative for leg swelling.   Gastrointestinal:  Negative for  abdominal pain, blood in stool, diarrhea and rectal pain.   Genitourinary:  Negative for dysuria, flank pain, hematuria, penile pain and urgency.   Musculoskeletal:  Positive for arthralgias, back pain, gait problem, joint swelling and myalgias.   Skin:  Negative for pallor.   Neurological:  Positive for speech difficulty and numbness. Negative for dizziness and facial asymmetry.   Psychiatric/Behavioral:  Negative for behavioral problems, decreased concentration, hallucinations and sleep disturbance. The patient is not nervous/anxious.        Personal History     No past medical history on file.    No past surgical history on file.    Family History: family history is not on file. Otherwise pertinent FHx was reviewed and not pertinent to current issue.    Social History:      Home Medications:  Prior to Admission Medications       None              Allergies:  Allergies   Allergen Reactions    Codeine Itching and Rash       Objective      Vitals:   Temp:  [97.7 °F (36.5 °C)] 97.7 °F (36.5 °C)  Heart Rate:  [] 92  Resp:  [18] 18  BP: ()/(57-95) 146/85  Body mass index is 53.09 kg/m².  Physical Exam  Constitutional:       General: He is not in acute distress.     Appearance: Normal appearance. He is obese. He is not toxic-appearing.   HENT:      Head: Atraumatic.      Nose: Nose normal.      Mouth/Throat:      Mouth: Mucous membranes are moist. Mucous membranes are dry.   Eyes:      Extraocular Movements: Extraocular movements intact.      Pupils: Pupils are equal, round, and reactive to light.   Cardiovascular:      Rate and Rhythm: Normal rate and regular rhythm.      Pulses: Normal pulses.      Heart sounds: Normal heart sounds.   Pulmonary:      Effort: Pulmonary effort is normal.      Breath sounds: Normal breath sounds.   Abdominal:      General: Abdomen is flat.      Palpations: Abdomen is soft.   Musculoskeletal:         General: Normal range of motion.      Cervical back: Neck supple.      Right  lower leg: No edema.      Left lower leg: No edema.   Skin:     General: Skin is dry.      Capillary Refill: Capillary refill takes less than 2 seconds.      Coloration: Skin is not jaundiced.   Neurological:      General: No focal deficit present.      Mental Status: He is alert and oriented to person, place, and time.      GCS: GCS eye subscore is 4. GCS verbal subscore is 5. GCS motor subscore is 6.      Cranial Nerves: No cranial nerve deficit or facial asymmetry.      Comments: Alert and awake, oriented to self space and person.  No facial asymmetry.  He has slow speech, bradylalia.  Finger-to-nose maneuver normal.  Strength bilateral UE/LE 5/5.  Sensation slightly decreased to pinprick touch left lower extremity below knee.  Patient did not walk.         Diagnostic Data:  Lab Results (last 24 hours)       Procedure Component Value Units Date/Time    Comprehensive Metabolic Panel [[id number]]  (Abnormal) Collected: 06/14/24 2138    Specimen: Blood Updated: 06/14/24 2212     Glucose 339 mg/dL      BUN 30 mg/dL      Creatinine 2.10 mg/dL      Sodium 134 mmol/L      Potassium 4.5 mmol/L      Chloride 92 mmol/L      CO2 29.8 mmol/L      Calcium 9.4 mg/dL      Total Protein 7.3 g/dL      Albumin 4.2 g/dL      ALT (SGPT) 9 U/L      AST (SGOT) 14 U/L      Alkaline Phosphatase 96 U/L      Total Bilirubin 0.2 mg/dL      Globulin 3.1 gm/dL      A/G Ratio 1.4 g/dL      BUN/Creatinine Ratio 14.3     Anion Gap 12.2 mmol/L      eGFR 34.5 mL/min/1.73     Narrative:      GFR Normal >60  Chronic Kidney Disease <60  Kidney Failure <15      Single High Sensitivity Troponin T [[id number]]  (Abnormal) Collected: 06/14/24 2138    Specimen: Blood Updated: 06/14/24 2212     HS Troponin T 27 ng/L     Narrative:      High Sensitive Troponin T Reference Range:  <14.0 ng/L- Negative Female for AMI  <22.0 ng/L- Negative Male for AMI  >=14 - Abnormal Female indicating possible myocardial injury.  >=22 - Abnormal Male indicating possible  myocardial injury.   Clinicians would have to utilize clinical acumen, EKG, Troponin, and serial changes to determine if it is an Acute Myocardial Infarction or myocardial injury due to an underlying chronic condition.         Protime-INR [[id number]]  (Abnormal) Collected: 06/14/24 2138    Specimen: Blood Updated: 06/14/24 2205     Protime 10.1 Seconds      INR <0.93    aPTT [[id number]]  (Normal) Collected: 06/14/24 2138    Specimen: Blood Updated: 06/14/24 2205     PTT 27.5 seconds     [place] Draw [[id number]] Collected: 06/14/24 2138    Specimen: Blood Updated: 06/14/24 2145    Narrative:      The following orders were created for panel order [place] Draw.  Procedure                               Abnormality         Status                     ---------                               -----------         ------                     Green Top (Gel)[[id number]]                                  Final result               Lavender Top[[id number]]                                     Final result               Gold Top - SST[[id number]]                                   Final result               Light Blue Top[[id number]]                                   Final result                 Please view results for these tests on the individual orders.    Green Top (Gel) [[id number]] Collected: 06/14/24 2138    Specimen: Blood Updated: 06/14/24 2145     Extra Tube Hold for add-ons.     Comment: Auto resulted.       Lavender Top [[id number]] Collected: 06/14/24 2138    Specimen: Blood Updated: 06/14/24 2145     Extra Tube hold for add-on     Comment: Auto resulted       Gold Top - SST [[id number]] Collected: 06/14/24 2138    Specimen: Blood Updated: 06/14/24 2145     Extra Tube Hold for add-ons.     Comment: Auto resulted.       Light Blue Top [[id number]] Collected: 06/14/24 2138    Specimen: Blood Updated: 06/14/24 2145     Extra Tube Hold for add-ons.     Comment: Auto resulted       CBC & Differential [[id number]]  (Abnormal) Collected:  06/14/24 2138    Specimen: Blood Updated: 06/14/24 2144    Narrative:      The following orders were created for panel order CBC & Differential.  Procedure                               Abnormality         Status                     ---------                               -----------         ------                     CBC Auto Differential[[id number]]        Abnormal            Final result                 Please view results for these tests on the individual orders.    CBC Auto Differential [[id number]]  (Abnormal) Collected: 06/14/24 2138    Specimen: Blood Updated: 06/14/24 2144     WBC 11.02 10*3/mm3      RBC 4.47 10*6/mm3      Hemoglobin 12.7 g/dL      Hematocrit 39.5 %      MCV 88.4 fL      MCH 28.4 pg      MCHC 32.2 g/dL      RDW 13.5 %      RDW-SD 43.9 fl      MPV 10.1 fL      Platelets 403 10*3/mm3      Neutrophil % 64.2 %      Lymphocyte % 25.1 %      Monocyte % 8.3 %      Eosinophil % 1.5 %      Basophil % 0.5 %      Immature Grans % 0.4 %      Neutrophils, Absolute 7.08 10*3/mm3      Lymphocytes, Absolute 2.77 10*3/mm3      Monocytes, Absolute 0.91 10*3/mm3      Eosinophils, Absolute 0.16 10*3/mm3      Basophils, Absolute 0.06 10*3/mm3      Immature Grans, Absolute 0.04 10*3/mm3      nRBC 0.0 /100 WBC     POC Glucose Once [[id number]]  (Abnormal) Collected: 06/14/24 2109    Specimen: Blood Updated: 06/14/24 2113     Glucose 336 mg/dL      Comment: Serial Number: [id number]:  [id number]                Imaging Results (Last 24 Hours)       Procedure Component Value Units Date/Time    MRI Brain Without Contrast [[id number]] Collected: 06/14/24 2346     Updated: 06/14/24 2351    Narrative:      MRI BRAIN WO CONTRAST    Date of Exam: 6/14/2024 11:20 PM EDT    Indication: Stroke, follow up  stroke protocol.     Comparison: CT head/perfusion 6/14/2024.    Technique:  Routine multiplanar/multisequence sequence images of the brain were obtained without contrast administration.      Findings:  There is no  diffusion restriction to suggest acute infarct. There is no mass effect, midline shift or abnormal extra-axial collection. Gradient images are degraded by artifact, but no focal intracranial hemorrhage is seen. There are a few patchy areas of   FLAIR hyperintense signal within the cerebral white matter. The basal ganglia, brainstem and cerebellum appear unremarkable. Orbits appear within normal limits. Superficial soft tissue and calvarial signal is unremarkable. Paranasal sinuses and mastoid   air cells appear well aerated. The major arterial flow voids appear intact.      Impression:      Impression:    1. No acute intracranial abnormality.  2. Minimal chronic small vessel ischemic change.        Electronically Signed: Scotty Rosario MD    6/14/2024 11:49 PM EDT    Workstation ID: [id number]    XR Chest 1 View [[id number]] Collected: 06/14/24 2229     Updated: 06/14/24 2233    Narrative:      XR CHEST 1 VW    Date of Exam: 6/14/2024 10:11 PM EDT    Indication: Acute Stroke Protocol (onset < 12 hrs)    Comparison: None available.    Findings: No focal consolidation. Mild vascular congestion. No pneumothorax or pleural effusion. Cardiac size is normal. The visualized clavicles appear intact. No displaced rib fractures. The visualized upper abdomen is normal.      Impression:      Impression: No acute cardiopulmonary disease.      Electronically Signed: Zenon Rodriguez MD    6/14/2024 10:31 PM EDT    Workstation ID: [id number]    CT Angiogram Head w AI Analysis of LVO [[id number]] Collected: 06/14/24 2157     Updated: 06/14/24 2205    Narrative:      CT ANGIOGRAM NECK, CT ANGIOGRAM HEAD W AI ANALYSIS OF LVO    Date of Exam: 6/14/2024 9:38 PM EDT    Indication: Stroke, follow up  Neuro deficit, acute, stroke suspected.    Comparison: CT head and perfusion dictated separately    Technique: CTA of the head and neck was performed before and after the uneventful intravenous administration of iodinated contrast. Reconstructed  coronal and sagittal images were also obtained. In addition, a 3-D volume rendered image was created for   interpretation. Automated exposure control and iterative reconstruction methods were used.      Findings:    CTA NECK:  *Aortic arch: No aneurysm. No significant stenosis or occlusion of the major arch vessels. Aortic atherosclerotic disease.  *Left carotid system: No aneurysm, significant stenosis or occlusion.  *Right carotid system: No aneurysm, significant stenosis or occlusion.  *Vertebrobasilar system: The vertebral arteries arise from their respective subclavian arteries. Negative for aneurysm or large vessel occlusion. Suspect focal mild to moderate stenosis of left V4 segment image 77 series 5 due to calcified plaque with   otherwise more distal opacification.    CTA HEAD:  *Anterior circulation: No aneurysm, significant stenosis or occlusion.  *Posterior circulation: No aneurysm, significant stenosis or occlusion.  *Anatomic variants: None of significance.  *Venous sinuses: Patent.    Degenerative elated changes throughout cervical spine. No high-grade central spinal canal stenosis. Lung apices grossly clear. Multilevel varying degrees of neuroforaminal stenosis which may be severe on the right C4-C5 and on the left C5-C6.      Impression:      1.No hemodynamically significant stenosis, large vessel occlusion or aneurysms in intracranial circulation  2.No hemodynamically significant stenosis, dissection or aneurysms in extracranial circulation.        Electronically Signed: Ben Jay MD    6/14/2024 10:03 PM EDT    Workstation ID: [id number]    CT Angiogram Neck [[id number]] Collected: 06/14/24 2157     Updated: 06/14/24 2205    Narrative:      CT ANGIOGRAM NECK, CT ANGIOGRAM HEAD W AI ANALYSIS OF LVO    Date of Exam: 6/14/2024 9:38 PM EDT    Indication: Stroke, follow up  Neuro deficit, acute, stroke suspected.    Comparison: CT head and perfusion dictated separately    Technique: CTA of the head  and neck was performed before and after the uneventful intravenous administration of iodinated contrast. Reconstructed coronal and sagittal images were also obtained. In addition, a 3-D volume rendered image was created for   interpretation. Automated exposure control and iterative reconstruction methods were used.      Findings:    CTA NECK:  *Aortic arch: No aneurysm. No significant stenosis or occlusion of the major arch vessels. Aortic atherosclerotic disease.  *Left carotid system: No aneurysm, significant stenosis or occlusion.  *Right carotid system: No aneurysm, significant stenosis or occlusion.  *Vertebrobasilar system: The vertebral arteries arise from their respective subclavian arteries. Negative for aneurysm or large vessel occlusion. Suspect focal mild to moderate stenosis of left V4 segment image 77 series 5 due to calcified plaque with   otherwise more distal opacification.    CTA HEAD:  *Anterior circulation: No aneurysm, significant stenosis or occlusion.  *Posterior circulation: No aneurysm, significant stenosis or occlusion.  *Anatomic variants: None of significance.  *Venous sinuses: Patent.    Degenerative elated changes throughout cervical spine. No high-grade central spinal canal stenosis. Lung apices grossly clear. Multilevel varying degrees of neuroforaminal stenosis which may be severe on the right C4-C5 and on the left C5-C6.      Impression:      1.No hemodynamically significant stenosis, large vessel occlusion or aneurysms in intracranial circulation  2.No hemodynamically significant stenosis, dissection or aneurysms in extracranial circulation.        Electronically Signed: Ben Jay MD    6/14/2024 10:03 PM EDT    Workstation ID: [id number]    CT CEREBRAL PERFUSION WITH & WITHOUT CONTRAST [[id number]] Collected: 06/14/24 2154     Updated: 06/14/24 2158    Narrative:      CT CEREBRAL PERFUSION W WO CONTRAST    Date of Exam: 6/14/2024 9:38 PM EDT    Indication: Neuro deficit, acute,  stroke suspected  Neuro deficit, acute, stroke suspected.     Comparison: Head CT performed earlier today    Technique: Axial CT images of the brain were obtained prior to and after the administration of iodinated contrast. CT Perfusion protocol was utilized. Automated post processing was performed by RAPID software and submitted to PACS for interpretation.   Automated exposure control and iterative reconstruction was utilized.      Findings:  FINDINGS:    CT Perfusion:  CBF (<30%) volume: 0 mL  Tmax (>6.0s) volume: 0 mL  Mismatch volume: 0 mL  Mismatch ratio: None    Small volume artifact versus hypoperfusion within the posterior circulation, Tmax greater than 4 seconds of 11 mm.      Impression:      Negative for completed infarct or ischemic penumbra.      Electronically Signed: Ben Jay MD    6/14/2024 9:55 PM EDT    Workstation ID: [id number]    CT Head Without Contrast Stroke Protocol [[id number]] Collected: 06/14/24 2123     Updated: 06/14/24 2129    Narrative:      CT HEAD WO CONTRAST STROKE PROTOCOL    Date of Exam: 6/14/2024 9:18 PM EDT    Indication: Neuro deficit, acute, stroke suspected  Neuro deficit, acute, stroke suspected.     Comparison: None available.    Technique: Axial CT images were obtained of the head without contrast administration.  Reconstructed coronal and sagittal images were also obtained. Automated exposure control and iterative construction methods were used.    Scan Time: 9:25 p.m. 6/14/2024  Results discussed with     at 9:25 p.m. 6/14/2024.      Findings:  Negative for large territory loss of gray-white differentiation, acute intracranial hemorrhage, large mass lesion, midline shift or hydrocephalus. There is prominence of the ventricles and extra-axial fluid spaces suggesting mild to moderate global   parenchymal volume loss. No large extra-axial fluid collection. No obstructive sinus disease or large mastoid effusion. Distal vertebral artery and carotid  atherosclerotic disease noted. Negative for depressed skull fracture. Scalp soft tissues   unremarkable.      Impression:      Impression:  Negative for acute intracranial hemorrhage, large territory infarct, midline shift or hydrocephalus.        Electronically Signed: Ben Jay MD    6/14/2024 9:27 PM EDT    Workstation ID: [id number]              Assessment & Plan        This is a 63 y.o. male with:    Active and Resolved Problems  Active Hospital Problems    Diagnosis  POA    **TIA (transient ischemic attack) [G45.9]  Yes      Resolved Hospital Problems   No resolved problems to display.       Possible TIA  Stroke rule out  Worsening diabetic neuropathy  ABCD2 score 6 points  CT angio head and neck in the ER did not show any large vessel occlusion  MRI brain negative for acute pathology  Status post loading dose with Plavix 300 mg and aspirin 300 mg in the ER  Continue dual antiplatelets 21 days  High intensity statin  With factor modification  Telemetry monitoring  Echo  Follow TSH, B12, lipid panel  PT OT eval    Hypertension  Start Norvasc 5 mg once daily  Holding losartan and HCTZ given his ENOC  Coreg 12.5 mg twice daily      History of major depressive disorder  Peripheral neuropathy  Continue Cymbalta and gabapentin home dose  Continue risperidone, trazodone, pending reconciliation    Chronic pain syndrome  Norco as needed for moderate pain    ENOC, likely prerenal superimposed on underlying diabetic nephropathy  Start LR 75 mill per hour, reassess rate in the morning  Renal US    Type 2 diabetes  Insulin sliding scale    VTE Prophylaxis:  Pharmacologic & mechanical VTE prophylaxis orders are present.        The patient desires to be as follows:    CODE STATUS:       Full Code    Sharri Hawk, who can be contacted at , is the designated person to make medical decisions on the patient's behalf if He is incapable of doing so. This was clarified with patient and/or next of kin on 6/14/2024  during the course of this H&P.    Admission Status:  I believe this patient meets inpatient status.    Expected Length of Stay: 1-2    PDMP and Medication Dispenses via Sidebar reviewed and consistent with patient reported medications.    I discussed the patient's findings and my recommendations with patient, family, and nursing staff.      Signature:     This document has been electronically signed by Carlos Llanes Alvarez, MD on Sobeida 15, 2024 00:43 EDT   [facility]

## 2024-06-16 LAB
ANION GAP SERPL CALCULATED.3IONS-SCNC: 8.2 MMOL/L (ref 5–15)
AORTIC DIMENSIONLESS INDEX: 0.85 (DI)
BASOPHILS # BLD AUTO: 0.03 10*3/MM3 (ref 0–0.2)
BASOPHILS NFR BLD AUTO: 0.3 % (ref 0–1.5)
BH CV ECHO MEAS - AO MAX PG: 7.2 MMHG
BH CV ECHO MEAS - AO MEAN PG: 4 MMHG
BH CV ECHO MEAS - AO V2 MAX: 134 CM/SEC
BH CV ECHO MEAS - AO V2 VTI: 22.9 CM
BH CV ECHO MEAS - AVA(I,D): 4.2 CM2
BH CV ECHO MEAS - EDV(CUBED): 39.3 ML
BH CV ECHO MEAS - EDV(MOD-SP2): 80.9 ML
BH CV ECHO MEAS - EDV(MOD-SP4): 85.9 ML
BH CV ECHO MEAS - EF(MOD-BP): 65 %
BH CV ECHO MEAS - EF(MOD-SP2): 67.4 %
BH CV ECHO MEAS - EF(MOD-SP4): 63.6 %
BH CV ECHO MEAS - ESV(CUBED): 8 ML
BH CV ECHO MEAS - ESV(MOD-SP2): 26.4 ML
BH CV ECHO MEAS - ESV(MOD-SP4): 31.3 ML
BH CV ECHO MEAS - FS: 41.2 %
BH CV ECHO MEAS - IVS/LVPW: 1.3 CM
BH CV ECHO MEAS - IVSD: 1.3 CM
BH CV ECHO MEAS - LA DIMENSION: 4.1 CM
BH CV ECHO MEAS - LAT PEAK E' VEL: 6.6 CM/SEC
BH CV ECHO MEAS - LV DIASTOLIC VOL/BSA (35-75): 31.7 CM2
BH CV ECHO MEAS - LV MASS(C)D: 122 GRAMS
BH CV ECHO MEAS - LV MAX PG: 5.2 MMHG
BH CV ECHO MEAS - LV MEAN PG: 3 MMHG
BH CV ECHO MEAS - LV SYSTOLIC VOL/BSA (12-30): 11.5 CM2
BH CV ECHO MEAS - LV V1 MAX: 114 CM/SEC
BH CV ECHO MEAS - LV V1 VTI: 19.8 CM
BH CV ECHO MEAS - LVIDD: 3.4 CM
BH CV ECHO MEAS - LVIDS: 2 CM
BH CV ECHO MEAS - LVOT AREA: 4.9 CM2
BH CV ECHO MEAS - LVOT DIAM: 2.5 CM
BH CV ECHO MEAS - LVPWD: 1 CM
BH CV ECHO MEAS - MED PEAK E' VEL: 5.8 CM/SEC
BH CV ECHO MEAS - MR MAX PG: 39.4 MMHG
BH CV ECHO MEAS - MR MAX VEL: 314 CM/SEC
BH CV ECHO MEAS - MV A MAX VEL: 126 CM/SEC
BH CV ECHO MEAS - MV DEC SLOPE: 954 CM/SEC2
BH CV ECHO MEAS - MV DEC TIME: 0.13 SEC
BH CV ECHO MEAS - MV E MAX VEL: 63.4 CM/SEC
BH CV ECHO MEAS - MV E/A: 0.5
BH CV ECHO MEAS - MV MAX PG: 8.9 MMHG
BH CV ECHO MEAS - MV MEAN PG: 3 MMHG
BH CV ECHO MEAS - MV P1/2T: 22.4 MSEC
BH CV ECHO MEAS - MV V2 VTI: 21.6 CM
BH CV ECHO MEAS - MVA(P1/2T): 9.8 CM2
BH CV ECHO MEAS - MVA(VTI): 4.5 CM2
BH CV ECHO MEAS - PA ACC TIME: 0.07 SEC
BH CV ECHO MEAS - PA V2 MAX: 93.8 CM/SEC
BH CV ECHO MEAS - PULM A REVS DUR: 0.14 SEC
BH CV ECHO MEAS - PULM A REVS VEL: 34.7 CM/SEC
BH CV ECHO MEAS - PULM DIAS VEL: 36.3 CM/SEC
BH CV ECHO MEAS - PULM S/D: 1.63
BH CV ECHO MEAS - PULM SYS VEL: 59 CM/SEC
BH CV ECHO MEAS - RV MAX PG: 2.5 MMHG
BH CV ECHO MEAS - RV V1 MAX: 79.8 CM/SEC
BH CV ECHO MEAS - RV V1 VTI: 14.3 CM
BH CV ECHO MEAS - SV(LVOT): 97.2 ML
BH CV ECHO MEAS - SV(MOD-SP2): 54.5 ML
BH CV ECHO MEAS - SV(MOD-SP4): 54.6 ML
BH CV ECHO MEAS - SVI(LVOT): 35.9 ML/M2
BH CV ECHO MEAS - SVI(MOD-SP2): 20.1 ML/M2
BH CV ECHO MEAS - SVI(MOD-SP4): 20.1 ML/M2
BH CV ECHO MEASUREMENTS AVERAGE E/E' RATIO: 10.23
BH CV ECHO SHUNT ASSESSMENT PERFORMED (HIDDEN SCRIPTING): 1
BH CV XLRA - TDI S': 12.1 CM/SEC
BUN SERPL-MCNC: 25 MG/DL (ref 8–23)
BUN/CREAT SERPL: 21.9 (ref 7–25)
CALCIUM SPEC-SCNC: 9.5 MG/DL (ref 8.6–10.5)
CHLORIDE SERPL-SCNC: 95 MMOL/L (ref 98–107)
CO2 SERPL-SCNC: 30.8 MMOL/L (ref 22–29)
CREAT SERPL-MCNC: 1.14 MG/DL (ref 0.76–1.27)
DEPRECATED RDW RBC AUTO: 42.5 FL (ref 37–54)
EGFRCR SERPLBLD CKD-EPI 2021: 71.8 ML/MIN/1.73
EOSINOPHIL # BLD AUTO: 0.02 10*3/MM3 (ref 0–0.4)
EOSINOPHIL NFR BLD AUTO: 0.2 % (ref 0.3–6.2)
ERYTHROCYTE [DISTWIDTH] IN BLOOD BY AUTOMATED COUNT: 13.1 % (ref 12.3–15.4)
GLUCOSE BLDC GLUCOMTR-MCNC: 241 MG/DL (ref 70–105)
GLUCOSE BLDC GLUCOMTR-MCNC: 289 MG/DL (ref 70–105)
GLUCOSE BLDC GLUCOMTR-MCNC: 300 MG/DL (ref 70–105)
GLUCOSE BLDC GLUCOMTR-MCNC: 320 MG/DL (ref 70–105)
GLUCOSE SERPL-MCNC: 270 MG/DL (ref 65–99)
HCT VFR BLD AUTO: 38.8 % (ref 37.5–51)
HGB BLD-MCNC: 12.1 G/DL (ref 13–17.7)
IMM GRANULOCYTES # BLD AUTO: 0.03 10*3/MM3 (ref 0–0.05)
IMM GRANULOCYTES NFR BLD AUTO: 0.3 % (ref 0–0.5)
LYMPHOCYTES # BLD AUTO: 1.78 10*3/MM3 (ref 0.7–3.1)
LYMPHOCYTES NFR BLD AUTO: 20.4 % (ref 19.6–45.3)
MCH RBC QN AUTO: 27.6 PG (ref 26.6–33)
MCHC RBC AUTO-ENTMCNC: 31.2 G/DL (ref 31.5–35.7)
MCV RBC AUTO: 88.4 FL (ref 79–97)
MONOCYTES # BLD AUTO: 0.74 10*3/MM3 (ref 0.1–0.9)
MONOCYTES NFR BLD AUTO: 8.5 % (ref 5–12)
NEUTROPHILS NFR BLD AUTO: 6.14 10*3/MM3 (ref 1.7–7)
NEUTROPHILS NFR BLD AUTO: 70.3 % (ref 42.7–76)
NRBC BLD AUTO-RTO: 0 /100 WBC (ref 0–0.2)
PLATELET # BLD AUTO: 411 10*3/MM3 (ref 140–450)
PMV BLD AUTO: 10.2 FL (ref 6–12)
POTASSIUM SERPL-SCNC: 4.5 MMOL/L (ref 3.5–5.2)
RBC # BLD AUTO: 4.39 10*6/MM3 (ref 4.14–5.8)
SINUS: 3.3 CM
SODIUM SERPL-SCNC: 134 MMOL/L (ref 136–145)
STJ: 2.7 CM
WBC NRBC COR # BLD AUTO: 8.74 10*3/MM3 (ref 3.4–10.8)

## 2024-06-16 PROCEDURE — 94660 CPAP INITIATION&MGMT: CPT

## 2024-06-16 PROCEDURE — 25810000003 LACTATED RINGERS PER 1000 ML: Performed by: STUDENT IN AN ORGANIZED HEALTH CARE EDUCATION/TRAINING PROGRAM

## 2024-06-16 PROCEDURE — 85025 COMPLETE CBC W/AUTO DIFF WBC: CPT | Performed by: STUDENT IN AN ORGANIZED HEALTH CARE EDUCATION/TRAINING PROGRAM

## 2024-06-16 PROCEDURE — 82948 REAGENT STRIP/BLOOD GLUCOSE: CPT

## 2024-06-16 PROCEDURE — 94799 UNLISTED PULMONARY SVC/PX: CPT

## 2024-06-16 PROCEDURE — 63710000001 INSULIN GLARGINE PER 5 UNITS: Performed by: STUDENT IN AN ORGANIZED HEALTH CARE EDUCATION/TRAINING PROGRAM

## 2024-06-16 PROCEDURE — 63710000001 INSULIN LISPRO (HUMAN) PER 5 UNITS: Performed by: STUDENT IN AN ORGANIZED HEALTH CARE EDUCATION/TRAINING PROGRAM

## 2024-06-16 PROCEDURE — 97166 OT EVAL MOD COMPLEX 45 MIN: CPT

## 2024-06-16 PROCEDURE — 80048 BASIC METABOLIC PNL TOTAL CA: CPT | Performed by: STUDENT IN AN ORGANIZED HEALTH CARE EDUCATION/TRAINING PROGRAM

## 2024-06-16 PROCEDURE — 25010000002 HEPARIN (PORCINE) PER 1000 UNITS: Performed by: STUDENT IN AN ORGANIZED HEALTH CARE EDUCATION/TRAINING PROGRAM

## 2024-06-16 RX ORDER — RISPERIDONE 1 MG/1
1 TABLET, ORALLY DISINTEGRATING ORAL DAILY
COMMUNITY
End: 2024-06-18 | Stop reason: HOSPADM

## 2024-06-16 RX ORDER — HYDROCODONE BITARTRATE AND ACETAMINOPHEN 7.5; 325 MG/1; MG/1
1 TABLET ORAL EVERY 6 HOURS PRN
COMMUNITY
End: 2024-06-18

## 2024-06-16 RX ORDER — DULOXETIN HYDROCHLORIDE 60 MG/1
1 CAPSULE, DELAYED RELEASE ORAL 2 TIMES DAILY
COMMUNITY
Start: 2024-07-04

## 2024-06-16 RX ORDER — FINASTERIDE 5 MG/1
1 TABLET, FILM COATED ORAL DAILY
COMMUNITY
Start: 2024-07-04

## 2024-06-16 RX ORDER — LOSARTAN POTASSIUM AND HYDROCHLOROTHIAZIDE 12.5; 1 MG/1; MG/1
1 TABLET ORAL DAILY
COMMUNITY
End: 2024-06-18 | Stop reason: HOSPADM

## 2024-06-16 RX ORDER — GLIPIZIDE 5 MG/1
1 TABLET ORAL
COMMUNITY
Start: 2024-07-04

## 2024-06-16 RX ORDER — AMLODIPINE BESYLATE 5 MG/1
5 TABLET ORAL ONCE
Status: COMPLETED | OUTPATIENT
Start: 2024-06-16 | End: 2024-06-16

## 2024-06-16 RX ORDER — CARVEDILOL 12.5 MG/1
1 TABLET ORAL 2 TIMES DAILY WITH MEALS
COMMUNITY
Start: 2024-07-04

## 2024-06-16 RX ORDER — PREGABALIN 75 MG/1
75 CAPSULE ORAL 2 TIMES DAILY
COMMUNITY
End: 2024-06-18

## 2024-06-16 RX ORDER — BACLOFEN 10 MG/1
1 TABLET ORAL 3 TIMES DAILY PRN
COMMUNITY
Start: 2024-07-04

## 2024-06-16 RX ORDER — OMEPRAZOLE 40 MG/1
1 CAPSULE, DELAYED RELEASE ORAL 2 TIMES DAILY
COMMUNITY
Start: 2024-07-04

## 2024-06-16 RX ORDER — ATORVASTATIN CALCIUM 20 MG/1
1 TABLET, FILM COATED ORAL DAILY
COMMUNITY
Start: 2024-07-04

## 2024-06-16 RX ORDER — TRAZODONE HYDROCHLORIDE 100 MG/1
2 TABLET ORAL NIGHTLY
COMMUNITY
Start: 2024-07-04

## 2024-06-16 RX ADMIN — INSULIN GLARGINE 10 UNITS: 100 INJECTION, SOLUTION SUBCUTANEOUS at 08:37

## 2024-06-16 RX ADMIN — INSULIN GLARGINE 10 UNITS: 100 INJECTION, SOLUTION SUBCUTANEOUS at 20:45

## 2024-06-16 RX ADMIN — Medication 10 ML: at 08:36

## 2024-06-16 RX ADMIN — INSULIN LISPRO 7 UNITS: 100 INJECTION, SOLUTION INTRAVENOUS; SUBCUTANEOUS at 11:31

## 2024-06-16 RX ADMIN — CARVEDILOL 12.5 MG: 6.25 TABLET, FILM COATED ORAL at 08:35

## 2024-06-16 RX ADMIN — FINASTERIDE 5 MG: 5 TABLET, FILM COATED ORAL at 08:35

## 2024-06-16 RX ADMIN — DULOXETINE HYDROCHLORIDE 60 MG: 30 CAPSULE, DELAYED RELEASE ORAL at 08:35

## 2024-06-16 RX ADMIN — HYDROCODONE BITARTRATE AND ACETAMINOPHEN 1 TABLET: 10; 325 TABLET ORAL at 16:32

## 2024-06-16 RX ADMIN — CLOPIDOGREL BISULFATE 75 MG: 75 TABLET ORAL at 08:35

## 2024-06-16 RX ADMIN — Medication 10 ML: at 08:37

## 2024-06-16 RX ADMIN — ASPIRIN 81 MG CHEWABLE TABLET 81 MG: 81 TABLET CHEWABLE at 08:35

## 2024-06-16 RX ADMIN — GABAPENTIN 300 MG: 300 CAPSULE ORAL at 08:35

## 2024-06-16 RX ADMIN — INSULIN LISPRO 6 UNITS: 100 INJECTION, SOLUTION INTRAVENOUS; SUBCUTANEOUS at 20:45

## 2024-06-16 RX ADMIN — ATORVASTATIN CALCIUM 80 MG: 40 TABLET, FILM COATED ORAL at 20:45

## 2024-06-16 RX ADMIN — INSULIN LISPRO 4 UNITS: 100 INJECTION, SOLUTION INTRAVENOUS; SUBCUTANEOUS at 08:37

## 2024-06-16 RX ADMIN — SODIUM CHLORIDE, POTASSIUM CHLORIDE, SODIUM LACTATE AND CALCIUM CHLORIDE 100 ML/HR: 600; 310; 30; 20 INJECTION, SOLUTION INTRAVENOUS at 01:10

## 2024-06-16 RX ADMIN — Medication 10 ML: at 20:46

## 2024-06-16 RX ADMIN — AMLODIPINE BESYLATE 5 MG: 5 TABLET ORAL at 18:21

## 2024-06-16 RX ADMIN — AMLODIPINE BESYLATE 5 MG: 5 TABLET ORAL at 08:35

## 2024-06-16 RX ADMIN — HEPARIN SODIUM 5000 UNITS: 5000 INJECTION INTRAVENOUS; SUBCUTANEOUS at 05:22

## 2024-06-16 RX ADMIN — HYDROCODONE BITARTRATE AND ACETAMINOPHEN 1 TABLET: 10; 325 TABLET ORAL at 02:08

## 2024-06-16 RX ADMIN — INSULIN LISPRO 6 UNITS: 100 INJECTION, SOLUTION INTRAVENOUS; SUBCUTANEOUS at 17:46

## 2024-06-16 RX ADMIN — DULOXETINE HYDROCHLORIDE 60 MG: 30 CAPSULE, DELAYED RELEASE ORAL at 20:45

## 2024-06-16 RX ADMIN — FOLIC ACID 1 MG: 1 TABLET ORAL at 08:35

## 2024-06-16 RX ADMIN — HEPARIN SODIUM 5000 UNITS: 5000 INJECTION INTRAVENOUS; SUBCUTANEOUS at 22:50

## 2024-06-16 RX ADMIN — HEPARIN SODIUM 5000 UNITS: 5000 INJECTION INTRAVENOUS; SUBCUTANEOUS at 17:47

## 2024-06-16 RX ADMIN — CARVEDILOL 12.5 MG: 6.25 TABLET, FILM COATED ORAL at 17:46

## 2024-06-16 NOTE — PROGRESS NOTES
[facility] MEDICINE SERVICE  DAILY PROGRESS NOTE    NAME: Matthieu Hawk  : 1960  MRN: [id number]      LOS: 0 days     PROVIDER OF SERVICE: Timothy Duane Brammell, MD    Chief Complaint: TIA (transient ischemic attack)    Subjective:     Interval History:  History taken from: patient  Patient Complaints: Patient with chronic lower extremity pain.  Chronic back pain.  Has sleep apnea at home and poorly compliant with his CPAP.  Denies any dizziness or any headache.  Denies any other additional acute issues.  Patient Denies:      Review of Systems:   Review of Systems   All other systems reviewed and are negative.      Objective:     Vital Signs  Temp:  [97.6 °F (36.4 °C)-99 °F (37.2 °C)] 97.6 °F (36.4 °C)  Heart Rate:  [] 78  Resp:  [11-15] 11  BP: (124-152)/(66-86) 152/86  Flow (L/min):  [2-5] 2   Body mass index is 53.09 kg/m².    Physical Exam  Physical Exam  Vitals reviewed.   Constitutional:       Appearance: He is obese.   HENT:      Head: Normocephalic.   Cardiovascular:      Rate and Rhythm: Normal rate and regular rhythm.   Pulmonary:      Effort: Pulmonary effort is normal.      Breath sounds: Normal breath sounds.   Abdominal:      Palpations: Abdomen is soft.      Tenderness: There is no abdominal tenderness.   Musculoskeletal:         General: No swelling.   Neurological:      Mental Status: He is alert.      Comments: Full exam deferred to neurology.         Scheduled Meds   amLODIPine, 5 mg, Oral, Q24H  aspirin, 81 mg, Oral, Daily   Or  aspirin, 300 mg, Rectal, Daily  atorvastatin, 80 mg, Oral, Nightly  carvedilol, 12.5 mg, Oral, BID With Meals  clopidogrel, 75 mg, Oral, Daily  DULoxetine, 60 mg, Oral, Q12H  finasteride, 5 mg, Oral, Daily  folic acid, 1 mg, Oral, Daily  gabapentin, 300 mg, Oral, TID  heparin (porcine), 5,000 Units, Subcutaneous, Q8H  insulin glargine, 10 Units, Subcutaneous, Q12H  insulin lispro, 2-9 Units, Subcutaneous, 4x Daily AC & at Bedtime  sodium chloride,  10 mL, Intravenous, Q12H  sodium chloride, 10 mL, Intravenous, Q12H       PRN Meds     acetaminophen **OR** acetaminophen    aluminum-magnesium hydroxide-simethicone    senna-docusate sodium **AND** polyethylene glycol **AND** bisacodyl **AND** bisacodyl    Calcium Replacement - Follow Nurse / BPA Driven Protocol    dextrose    dextrose    glucagon (human recombinant)    HYDROcodone-acetaminophen    Magnesium Standard Dose Replacement - Follow Nurse / BPA Driven Protocol    ondansetron    Phosphorus Replacement - Follow Nurse / BPA Driven Protocol    Potassium Replacement - Follow Nurse / BPA Driven Protocol    sodium chloride    sodium chloride    sodium chloride    sodium chloride    sodium chloride   Infusions  lactated ringers, 100 mL/hr, Last Rate: 100 mL/hr (06/16/24 0110)          Diagnostic Data    Results from last 7 days   Lab Units 06/16/24  0124 06/15/24  0340   WBC 10*3/mm3 8.74 10.80   HEMOGLOBIN g/dL 12.1* 12.7*   HEMATOCRIT % 38.8 40.4   PLATELETS 10*3/mm3 411 416   GLUCOSE mg/dL 270* 322*   CREATININE mg/dL 1.14 1.76*   BUN mg/dL 25* 32*   SODIUM mmol/L 134* 134*   POTASSIUM mmol/L 4.5 4.9   AST (SGOT) U/L  --  17   ALT (SGPT) U/L  --  8   ALK PHOS U/L  --  91   BILIRUBIN mg/dL  --  0.2   ANION GAP mmol/L 8.2 11.7       MRI Lumbar Spine With & Without Contrast    Result Date: 6/15/2024  Impression: Contrast-enhanced MRI of the thoracic spine demonstrates no evidence of acute osseous findings or significant spinal canal or neuroforaminal impingement. There is no definite intramedullary cord signal abnormality or abnormal enhancement. The lumbar spine demonstrates some mild spondylosis changes and superimposed epidural lipomatosis which results in some areas of moderate spinal canal and neuroforaminal narrowing. There is no abnormal enhancement. Electronically Signed: Omar Merritt MD  6/15/2024 4:20 PM EDT  Workstation ID: [id number]    MRI Thoracic Spine With & Without Contrast    Result Date:  6/15/2024  Impression: Contrast-enhanced MRI of the thoracic spine demonstrates no evidence of acute osseous findings or significant spinal canal or neuroforaminal impingement. There is no definite intramedullary cord signal abnormality or abnormal enhancement. The lumbar spine demonstrates some mild spondylosis changes and superimposed epidural lipomatosis which results in some areas of moderate spinal canal and neuroforaminal narrowing. There is no abnormal enhancement. Electronically Signed: Omar Merritt MD  6/15/2024 4:20 PM EDT  Workstation ID: [id number]    US Renal Bilateral    Result Date: 6/15/2024  Impression: Normal ultrasound appearance of the kidneys with no obstructive uropathy Electronically Signed: José Miguel Muse  6/15/2024 7:29 AM EDT  Workstation ID: [id number]    MRI Brain Without Contrast    Result Date: 6/14/2024  Impression: 1. No acute intracranial abnormality. 2. Minimal chronic small vessel ischemic change. Electronically Signed: Scotty Rosario MD  6/14/2024 11:49 PM EDT  Workstation ID: [id number]    XR Chest 1 View    Result Date: 6/14/2024  Impression: No acute cardiopulmonary disease. Electronically Signed: Zenon Rodriguez MD  6/14/2024 10:31 PM EDT  Workstation ID: [id number]    CT Angiogram Head w AI Analysis of LVO    Result Date: 6/14/2024  1.No hemodynamically significant stenosis, large vessel occlusion or aneurysms in intracranial circulation 2.No hemodynamically significant stenosis, dissection or aneurysms in extracranial circulation. Electronically Signed: Ben Jay MD  6/14/2024 10:03 PM EDT  Workstation ID: [id number]    CT Angiogram Neck    Result Date: 6/14/2024  1.No hemodynamically significant stenosis, large vessel occlusion or aneurysms in intracranial circulation 2.No hemodynamically significant stenosis, dissection or aneurysms in extracranial circulation. Electronically Signed: Ben Jay MD  6/14/2024 10:03 PM EDT  Workstation ID: [id number]    CT CEREBRAL PERFUSION  WITH & WITHOUT CONTRAST    Result Date: 6/14/2024  Negative for completed infarct or ischemic penumbra. Electronically Signed: Ben Jay MD  6/14/2024 9:55 PM EDT  Workstation ID: [id number]    CT Head Without Contrast Stroke Protocol    Result Date: 6/14/2024  Impression: Negative for acute intracranial hemorrhage, large territory infarct, midline shift or hydrocephalus. Electronically Signed: Ben Jay MD  6/14/2024 9:27 PM EDT  Workstation ID: [id number]           Assessment:    Lower extremity neuropathy  Obesity  Chronic back pain  Sleep apnea  Type 2 diabetes  Acute renal injury  Polypharmacy     Plan.  Attempting physical and Occupational Therapy.  Patient has walker as well as wheelchair at home.  Unclear as if patient will be able to be discharged home or to short-term rehab setting.  Appreciate neurology input. Patient will benefit from short term rehab, less than 30 days prior to returning home.  Active and Resolved Problems  Active Hospital Problems    Diagnosis  POA    **TIA (transient ischemic attack) [G45.9]  Yes      Resolved Hospital Problems   No resolved problems to display.           VTE Prophylaxis:  Pharmacologic & mechanical VTE prophylaxis orders are present.         Code status is   Code Status and Medical Interventions:   Ordered at: 06/15/24 0611     Level Of Support Discussed With:    Patient     Code Status (Patient has no pulse and is not breathing):    CPR (Attempt to Resuscitate)     Medical Interventions (Patient has pulse or is breathing):    Full Support       Plan for disposition:rehab in 2 days    Time: 30 minutes    Signature: Electronically signed by Timothy Duane Brammell, MD, 06/16/24, 10:41 EDT.  [facility], [place] Hospitalist Team

## 2024-06-16 NOTE — CASE MANAGEMENT/SOCIAL WORK
Discharge Planning Assessment   Ambrosio     Patient Name: Matthieu Hawk  MRN: [id number]  Today's Date: 6/16/2024    Admit Date: 6/14/2024    Plan: [facility] (Following- if pt agreeable)  Will require precert and PASRR   Discharge Needs Assessment       Row Name 06/16/24 1651       Resource/Environmental Concerns    Transportation Concerns none       Transition Planning    Patient/Family Anticipates Transition to inpatient rehabilitation facility       Discharge Needs Assessment    Equipment Currently Used at Home cane, quad;wheelchair;grab bar;shower chair    Discharge Facility/Level of Care Needs nursing facility, skilled    Patient's Choice of Community Agency(s) [facility]- spouse works there.      Row Name 06/16/24 1649       Living Environment    People in Home spouse    Current Living Arrangements home    Potentially Unsafe Housing Conditions none    In the past 12 months has the electric, gas, oil, or water company threatened to shut off services in your home? No    Primary Care Provided by self    Provides Primary Care For no one    Family Caregiver if Needed spouse    Quality of Family Relationships helpful;involved;supportive    Able to Return to Prior Arrangements yes       Resource/Environmental Concerns    Resource/Environmental Concerns none    Transportation Concerns none       Transportation Needs    In the past 12 months, has lack of transportation kept you from medical appointments or from getting medications? no    In the past 12 months, has lack of transportation kept you from meetings, work, or from getting things needed for daily living? No       Food Insecurity    Within the past 12 months, you worried that your food would run out before you got the money to buy more. Never true    Within the past 12 months, the food you bought just didn't last and you didn't have money to get more. Never true       Transition Planning    Patient/Family Anticipates Transition to home with family     Patient/Family Anticipated Services at Transition none    Transportation Anticipated family or friend will provide       Discharge Needs Assessment    Readmission Within the Last 30 Days no previous admission in last 30 days    Equipment Currently Used at Home cane, quad;walker, rolling;cpap;wheelchair;grab bar;shower chair;rollator    Concerns to be Addressed discharge planning    Anticipated Changes Related to Illness none    Equipment Needed After Discharge none    Discharge Facility/Level of Care Needs acute rehab    Provided Post Acute Provider List? Refused                   Discharge Plan       Row Name 06/16/24 1653       Plan    Patient/Family in Agreement with Plan yes      Row Name 06/16/24 1640       Plan    Plan [facility] (Following- if pt agreeable)  Will require precert and PASRR    Plan Comments met with patient at bedside.  pt lives at home with spouse.  has a wheelchair and cane at home.  pcp and pharm verified.  interested in [facility] as wife works at facility, but does not have any [employer] benefits- [facility] May be able to accept pending bed avail and if pt is agreeable.                  Continued Care and Services - Admitted Since 6/14/2024       Destination       Service Provider Request Status Selected Services Address Phone Fax Patient Preferred    [facility] Pending - Request Sent N/A [id number] [facility] [place] [address] [phone] --                  Expected Discharge Date and Time       Expected Discharge Date Expected Discharge Time    Jun 18, 2024            Demographic Summary       Row Name 06/16/24 1645       General Information    Admission Type observation    Arrived From emergency department    Required Notices Provided Observation Status Notice  per reg    Referral Source admission list    Reason for Consult discharge planning    Preferred Language English                   Functional Status       Row Name 06/16/24 3800        Functional Status    Usual Activity Tolerance moderate    Current Activity Tolerance fair       Functional Status, IADL    Medications assistive person    Meal Preparation assistive person    Housekeeping assistive person    Laundry assistive person    Shopping assistive person      Row Name 06/16/24 1649       Functional Status    Usual Activity Tolerance fair    Current Activity Tolerance poor       Functional Status, IADL    Medications completely dependent    Meal Preparation completely dependent    Housekeeping completely dependent    Laundry completely dependent    Shopping completely dependent       Mental Status    General Appearance WDL WDL       Mental Status Summary    Recent Changes in Mental Status/Cognitive Functioning no changes                   Psychosocial    No documentation.                  Abuse/Neglect    No documentation.                  Legal    No documentation.                  Substance Abuse    No documentation.                  Patient Forms    No documentation.                     Yusra Carmichael RN

## 2024-06-16 NOTE — PLAN OF CARE
Goal Outcome Evaluation:  Plan of Care Reviewed With: patient        Progress: no change  Outcome Evaluation: Pt is a 64 y/o M admitted to [facility] 6/14/24 with c/o LLE numbness and BLE weakness, and slurred speech; dx TIA. At baseline pt resides with spouse in ground floor [address]. Pt typically ambulates with quad cane for short distance, electric scooter long distance, active . Pt spouse assist with socks, shoes, and threading pants. Pt had x3 falls 6/14/24, additional 7 falls within 3 mos, unable to get up once down. This date pt A&Ox4, on 2L O2 and in supine upon arrival. Pt titrated to RA, saturating WNL throughout, placed back on 2L post session. Pt requires min A supine to sit, max A x2 RW to come to standing and mod A x2 RW to transfer from bed to chair. Pt tremulous when on feet, appears to have global BLE weakness, limiting further mobility. Pt requires max A to don socks and to don pants. Pt functioning far below baseline and with significant hx of falls, pt is not safe to dc home at this time. OT recomending SNF when medically appropriate for dc, OT will follow while admitted.      Anticipated Discharge Disposition (OT): skilled nursing facility

## 2024-06-16 NOTE — PLAN OF CARE
Goal Outcome Evaluation:   Orders received for speech/language eval. Pt's imaging is negative for new CVA and he is at baseline cognitively. No speech/lang eval is indicated. ST will sign off. Please re-consult if any further needs arise.

## 2024-06-16 NOTE — PLAN OF CARE
Problem: Adult Inpatient Plan of Care  Goal: Plan of Care Review  Outcome: Ongoing, Progressing  Goal: Patient-Specific Goal (Individualized)  Outcome: Ongoing, Progressing  Goal: Absence of Hospital-Acquired Illness or Injury  Outcome: Ongoing, Progressing  Intervention: Identify and Manage Fall Risk  Recent Flowsheet Documentation  Taken 6/16/2024 0208 by Angeles Kendrick LPN  Safety Promotion/Fall Prevention:   activity supervised   assistive device/personal items within reach  Taken 6/15/2024 2358 by Angeles Kendrick LPN  Safety Promotion/Fall Prevention:   assistive device/personal items within reach   activity supervised  Taken 6/15/2024 2200 by Angeles Kendrick LPN  Safety Promotion/Fall Prevention:   activity supervised   assistive device/personal items within reach  Taken 6/15/2024 2000 by Angeles Kendrick LPN  Safety Promotion/Fall Prevention:   activity supervised   assistive device/personal items within reach  Intervention: Prevent Skin Injury  Recent Flowsheet Documentation  Taken 6/15/2024 2358 by Angeles Kendrick LPN  Body Position: position changed independently  Taken 6/15/2024 2000 by Angeles Kendrick LPN  Body Position: position changed independently  Skin Protection:   adhesive use limited   tubing/devices free from skin contact  Intervention: Prevent and Manage VTE (Venous Thromboembolism) Risk  Recent Flowsheet Documentation  Taken 6/15/2024 2358 by Angeles Kendrick LPN  Activity Management: activity encouraged  VTE Prevention/Management:   bilateral   sequential compression devices off   patient refused intervention  Taken 6/15/2024 2000 by Angeles Kendrick LPN  Activity Management: activity encouraged  VTE Prevention/Management:   bilateral   sequential compression devices off   patient refused intervention  Intervention: Prevent Infection  Recent Flowsheet Documentation  Taken 6/16/2024 0208 by Angeles Kendrick LPN  Infection Prevention:   equipment surfaces disinfected   environmental  surveillance performed  Taken 6/15/2024 2358 by Angeles Kendrick LPN  Infection Prevention:   environmental surveillance performed   equipment surfaces disinfected  Taken 6/15/2024 2200 by Angeles Kendrick LPN  Infection Prevention:   environmental surveillance performed   equipment surfaces disinfected  Taken 6/15/2024 2000 by Angeles Kendrick LPN  Infection Prevention:   environmental surveillance performed   equipment surfaces disinfected  Goal: Optimal Comfort and Wellbeing  Outcome: Ongoing, Progressing  Intervention: Provide Person-Centered Care  Recent Flowsheet Documentation  Taken 6/15/2024 2000 by Angeles Kendrick LPN  Trust Relationship/Rapport:   care explained   emotional support provided   questions answered   questions encouraged   reassurance provided   thoughts/feelings acknowledged  Goal: Readiness for Transition of Care  Outcome: Ongoing, Progressing     Problem: Skin Injury Risk Increased  Goal: Skin Health and Integrity  Outcome: Ongoing, Progressing  Intervention: Optimize Skin Protection  Recent Flowsheet Documentation  Taken 6/15/2024 2358 by Angeles Kendrick LPN  Head of Bed (HOB) Positioning: HOB elevated  Taken 6/15/2024 2000 by Angeles Kendrick LPN  Pressure Reduction Techniques:   frequent weight shift encouraged   sit time limited to 2 hours  Head of Bed (HOB) Positioning: HOB elevated  Pressure Reduction Devices:   pressure-redistributing mattress utilized   specialty bed utilized  Skin Protection:   adhesive use limited   tubing/devices free from skin contact     Problem: COPD (Chronic Obstructive Pulmonary Disease) Comorbidity  Goal: Maintenance of COPD Symptom Control  Outcome: Ongoing, Progressing  Intervention: Maintain COPD-Symptom Control  Recent Flowsheet Documentation  Taken 6/16/2024 0208 by Angeles Kendrick LPN  Medication Review/Management: medications reviewed  Taken 6/15/2024 2358 by Angeles Kendrick LPN  Medication Review/Management: medications reviewed  Taken 6/15/2024  2200 by Angeles Kendrick LPN  Medication Review/Management: medications reviewed  Taken 6/15/2024 2000 by Angeles Kendrick LPN  Medication Review/Management: medications reviewed     Problem: Diabetes Comorbidity  Goal: Blood Glucose Level Within Targeted Range  Outcome: Ongoing, Progressing     Problem: Hypertension Comorbidity  Goal: Blood Pressure in Desired Range  Outcome: Ongoing, Progressing  Intervention: Maintain Blood Pressure Management  Recent Flowsheet Documentation  Taken 6/16/2024 0208 by Angeles Kendrick LPN  Medication Review/Management: medications reviewed  Taken 6/15/2024 2358 by Angeles Kendrick LPN  Medication Review/Management: medications reviewed  Taken 6/15/2024 2200 by Angeles Kendrick LPN  Medication Review/Management: medications reviewed  Taken 6/15/2024 2000 by Angeles Kendrick LPN  Medication Review/Management: medications reviewed     Problem: Obstructive Sleep Apnea Risk or Actual Comorbidity Management  Goal: Unobstructed Breathing During Sleep  Outcome: Ongoing, Progressing     Problem: Fall Injury Risk  Goal: Absence of Fall and Fall-Related Injury  Outcome: Ongoing, Progressing  Intervention: Identify and Manage Contributors  Recent Flowsheet Documentation  Taken 6/16/2024 0208 by Angeles Kendrick LPN  Medication Review/Management: medications reviewed  Taken 6/15/2024 2358 by Angeles Kendrick LPN  Medication Review/Management: medications reviewed  Taken 6/15/2024 2200 by Angeles Kendrick LPN  Medication Review/Management: medications reviewed  Taken 6/15/2024 2000 by Angeles Kendrick LPN  Medication Review/Management: medications reviewed  Intervention: Promote Injury-Free Environment  Recent Flowsheet Documentation  Taken 6/16/2024 0208 by Angeles Kendrick LPN  Safety Promotion/Fall Prevention:   activity supervised   assistive device/personal items within reach  Taken 6/15/2024 2358 by Angeles Kendrick LPN  Safety Promotion/Fall Prevention:   assistive device/personal items  within reach   activity supervised  Taken 6/15/2024 2200 by Angeles Kendrick LPN  Safety Promotion/Fall Prevention:   activity supervised   assistive device/personal items within reach  Taken 6/15/2024 2000 by Angeles Kendrick LPN  Safety Promotion/Fall Prevention:   activity supervised   assistive device/personal items within reach   Goal Outcome Evaluation:

## 2024-06-16 NOTE — THERAPY EVALUATION
Patient Name: Matthieu Hawk  : 1960    MRN: [id number]                              Today's Date: 2024       Admit Date: 2024    Visit Dx:     ICD-10-CM ICD-9-CM   1. Weakness  R53.1 780.79   2. Renal insufficiency  N28.9 593.9   3. Uncontrolled type 2 diabetes mellitus with hyperglycemia  E11.65 250.02     Patient Active Problem List   Diagnosis    TIA (transient ischemic attack)     History reviewed. No pertinent past medical history.  History reviewed. No pertinent surgical history.   General Information       Row Name 24 1409          OT Time and Intention    Document Type evaluation  -MS     Mode of Treatment occupational therapy  -MS       Row Name 24 1409          General Information    Patient Profile Reviewed yes  -MS     Prior Level of Function min assist:;ADL's;independent:;all household mobility;community mobility;driving;transfer  -MS     Existing Precautions/Restrictions fall;oxygen therapy device and L/min  -MS     Barriers to Rehab medically complex;previous functional deficit  -MS       Row Name 24 1409          Occupational Profile    Reason for Services/Referral (Occupational Profile) Pt is a 64 y/o M admitted to [facility] 24 with c/o LLE numbness and BLE weakness, and slurred speech; dx TIA. CT head (-) acute. CT angiogram head/neck (-) acute. CT cerebral perfusion (-) completed infarct. CXR (-) acute. MRI brain (-) acute. [place] carotid duplex indicates normal flow L/R ICA. MRI thoracic/lumbar spine (-) osseous findings, (-) intramedullary cord signal abnormality, lumbar spine (+) moderate spinal canal and neuro-foraminal narrowing. PMHx significant for HTN, diabetic peripheral neuropathy, DMII and chronic LBP. At baseline pt resides with spouse in ground floor apartment [address] [facility]. Pt typically ambulates with quad cane for short distance, electric scooter long distance, active . Pt spouse assist with socks, shoes, and threading pants. Pt had x3 falls 24,  additional 7 falls within 3 mos, unable to get up once down.  -MS     Environmental Supports and Barriers (Occupational Profile) supportive family  -MS       Row Name 06/16/24 1409          Living Environment    People in Home spouse  -MS       Row Name 06/16/24 1409          Home Main Entrance    Number of Stairs, Main Entrance none  -MS       Row Name 06/16/24 1409          Stairs Within Home, Primary    Number of Stairs, Within Home, Primary none  -MS       Row Name 06/16/24 1409          Cognition    Orientation Status (Cognition) oriented x 4  -MS       Row Name 06/16/24 1409          Safety Issues, Functional Mobility    Impairments Affecting Function (Mobility) balance;endurance/activity tolerance;pain;postural/trunk control;range of motion (ROM);strength;shortness of breath  -MS               User Key  (r) = Recorded By, (t) = Taken By, (c) = Cosigned By      Initials Name Provider Type    Amparo Genao OT Occupational Therapist                     Mobility/ADL's       Row Name 06/16/24 1409          Bed Mobility    Bed Mobility supine-sit  -MS     Supine-Sit [place] (Bed Mobility) minimum assist (75% patient effort)  -MS     Assistive Device (Bed Mobility) head of bed elevated;bed rails  -MS     Comment, (Bed Mobility) significant reliance on bed rail  -MS       Row Name 06/16/24 1409          Transfers    Transfers sit-stand transfer;bed-chair transfer  -MS       Row Name 06/16/24 1409          Bed-Chair Transfer    Bed-Chair [place] (Transfers) moderate assist (50% patient effort);2 person assist  -MS     Assistive Device (Bed-Chair Transfers) walker, front-wheeled  -MS     Comment, (Bed-Chair Transfer) tremulous with transfer  -MS       Row Name 06/16/24 1409          Sit-Stand Transfer    Sit-Stand [place] (Transfers) maximum assist (25% patient effort);2 person assist  -MS     Assistive Device (Sit-Stand Transfers) walker, front-wheeled  -MS     Comment, (Sit-Stand Transfer)  tremulous with transfer  -MS       Row Name 06/16/24 1409          Activities of Daily Living    BADL Assessment/Intervention lower body dressing  -MS       Row Name 06/16/24 1409          Lower Body Dressing Assessment/Training    [place] Level (Lower Body Dressing) don;socks;maximum assist (25% patient effort);pants/bottoms  -MS     Position (Lower Body Dressing) edge of bed sitting;supported standing  -MS     Comment, (Lower Body Dressing) requires assist with pulling up pants as pt supports self with BUE on RW  -MS               User Key  (r) = Recorded By, (t) = Taken By, (c) = Cosigned By      Initials Name Provider Type    Amparo Genao OT Occupational Therapist                   Obj/Interventions       Row Name 06/16/24 1412          Sensory Assessment (Somatosensory)    Sensory Assessment (Somatosensory) UE sensation intact  -MS       Row Name 06/16/24 1412          Vision Assessment/Intervention    Visual Impairment/Limitations WFL  -MS       Row Name 06/16/24 1412          Range of Motion Comprehensive    Comment, General Range of Motion BUE WFL  -MS       Row Name 06/16/24 1412          Strength Comprehensive (MMT)    Comment, General Manual Muscle Testing (MMT) Assessment BUE grossly 3+/5  -MS       Row Name 06/16/24 1412          Balance    Balance Assessment sitting static balance;sitting dynamic balance;standing static balance;standing dynamic balance  -MS     Static Sitting Balance standby assist  -MS     Dynamic Sitting Balance contact guard;minimal assist  -MS     Position, Sitting Balance unsupported;sitting edge of bed  -MS     Static Standing Balance moderate assist;2-person assist  -MS     Dynamic Standing Balance moderate assist;2-person assist  -MS     Position/Device Used, Standing Balance supported;walker, front-wheeled  -MS               User Key  (r) = Recorded By, (t) = Taken By, (c) = Cosigned By      Initials Name Provider Type    Amparo Genao OT Occupational Therapist                    Goals/Plan       Row Name 06/16/24 1424          Bed Mobility Goal 1 (OT)    Activity/Assistive Device (Bed Mobility Goal 1, OT) bed mobility activities, all  -MS     [place] Level/Cues Needed (Bed Mobility Goal 1, OT) standby assist  -MS     Time Frame (Bed Mobility Goal 1, OT) long term goal (LTG);2 weeks  -MS     Progress/Outcomes (Bed Mobility Goal 1, OT) new goal  -MS       Row Name 06/16/24 1424          Transfer Goal 1 (OT)    Activity/Assistive Device (Transfer Goal 1, OT) transfers, all  -MS     [place] Level/Cues Needed (Transfer Goal 1, OT) minimum assist (75% or more patient effort)  -MS     Time Frame (Transfer Goal 1, OT) long term goal (LTG);2 weeks  -MS     Progress/Outcome (Transfer Goal 1, OT) new goal  -[facility] Name 06/16/24 1424          Toileting Goal 1 (OT)    Activity/Device (Toileting Goal 1, OT) toileting skills, all  -MS     [place] Level/Cues Needed (Toileting Goal 1, OT) minimum assist (75% or more patient effort)  -MS     Time Frame (Toileting Goal 1, OT) long term goal (LTG);2 weeks  -MS     Progress/Outcome (Toileting Goal 1, OT) new goal  -MS       Row Name 06/16/24 1424          Problem Specific Goal 1 (OT)    Problem Specific Goal 1 (OT) increase activity tolerance needed for ADL routine >5 minutes without rest break  -MS     Time Frame (Problem Specific Goal 1, OT) long term goal (LTG);2 weeks  -MS     Progress/Outcome (Problem Specific Goal 1, OT) new goal  -MS       Row Name 06/16/24 1424          Therapy Assessment/Plan (OT)    Planned Therapy Interventions (OT) activity tolerance training;adaptive equipment training;BADL retraining;functional balance retraining;IADL retraining;occupation/activity based interventions;passive ROM/stretching;patient/caregiver education/training;transfer/mobility retraining;strengthening exercise;ROM/therapeutic exercise  -MS               User Key  (r) = Recorded By, (t) = Taken By, (c) = Cosigned By       Initials Name Provider Type    MS Amparo Smith, OT Occupational Therapist                   Clinical Impression       Row Name 06/16/24 1418          Pain Assessment    Pretreatment Pain Rating 7/10  -MS     Posttreatment Pain Rating 7/10  -MS     Pain Location - Side/Orientation Left  -MS     Pain Location lower  -MS     Pain Location - extremity  -MS     Pain Intervention(s) Repositioned;Emotional support  -MS       Row Name 06/16/24 1418          Plan of Care Review    Plan of Care Reviewed With patient  -MS     Progress no change  -MS     Outcome Evaluation Pt is a 62 y/o M admitted to [facility] 6/14/24 with c/o LLE numbness and BLE weakness, and slurred speech; dx TIA. At baseline pt resides with spouse in ground floor apartment [address]. Pt typically ambulates with quad cane for short distance, electric scooter long distance, active . Pt spouse assist with socks, shoes, and threading pants. Pt had x3 falls 6/14/24, additional 7 falls within 3 mos, unable to get up once down. This date pt A&Ox4, on 2L O2 and in supine upon arrival. Pt titrated to RA, saturating WNL throughout, placed back on 2L post session. Pt requires min A supine to sit, max A x2 RW to come to standing and mod A x2 RW to transfer from bed to chair. Pt tremulous when on feet, appears to have global BLE weakness, limiting further mobility. Pt requires max A to don socks and to don pants. Pt functioning far below baseline and with significant hx of falls, pt is not safe to dc home at this time. OT recomending SNF when medically appropriate for dc, OT will follow while admitted.  -MS       Row Name 06/16/24 1418          Therapy Plan Review/Discharge Plan (OT)    Equipment Needs Upon Discharge (OT) walker, rolling  -MS     Anticipated Discharge Disposition (OT) skilled nursing facility  -MS       Row Name 06/16/24 1418          Vital Signs    Pre SpO2 (%) 94  -MS     O2 Delivery Pre Treatment nasal cannula  -MS     O2 Delivery Intra Treatment  room air  -MS     Post SpO2 (%) 93  -MS     O2 Delivery Post Treatment nasal cannula  -MS     Pre Patient Position Supine  -MS     Intra Patient Position Standing  -MS     Post Patient Position Sitting  -MS       Row Name 06/16/24 1418          Positioning and Restraints    Pre-Treatment Position in bed  -MS     Post Treatment Position chair  -MS     In Chair notified nsg;sitting;call light within reach;encouraged to call for assist;exit alarm on;with family/caregiver  -MS               User Key  (r) = Recorded By, (t) = Taken By, (c) = Cosigned By      Initials Name Provider Type    MS Luis Amparo, OT Occupational Therapist                   Outcome Measures       Row Name 06/16/24 1425          How much help from another is currently needed...    Putting on and taking off regular lower body clothing? 2  -MS     Bathing (including washing, rinsing, and drying) 2  -MS     Toileting (which includes using toilet bed pan or urinal) 2  -MS     Putting on and taking off regular upper body clothing 3  -MS     Taking care of personal grooming (such as brushing teeth) 3  -MS     Eating meals 3  -MS     AM-PAC 6 Clicks Score (OT) 15  -MS       Row Name 06/16/24 0838          How much help from another person do you currently need...    Turning from your back to your side while in flat bed without using bedrails? 3  -DH     Moving from lying on back to sitting on the side of a flat bed without bedrails? 3  -DH     Moving to and from a bed to a chair (including a wheelchair)? 3  -DH     Standing up from a chair using your arms (e.g., wheelchair, bedside chair)? 3  -DH     Climbing 3-5 steps with a railing? 3  -DH     To walk in hospital room? 3  -DH     AM-PAC 6 Clicks Score (PT) 18  -DH     Highest Level of Mobility Goal 6 --> Walk 10 steps or more  -DH       Row Name 06/16/24 1421          Modified [place] Scale    Pre-Stroke Modified [place] Scale 0 - No Symptoms at all.  -MS     Modified [place] Scale 4 - Moderately severe  disability.  Unable to walk without assistance, and unable to attend to own bodily needs without assistance.  -MS       Row Name 06/16/24 1425          Functional Assessment    Outcome Measure Options AM-PAC 6 Clicks Daily Activity (OT);Modified [place]  -MS               User Key  (r) = Recorded By, (t) = Taken By, (c) = Cosigned By      Initials Name Provider Type     Isela Conteh RN Registered Nurse    Amparo Genao OT Occupational Therapist                    Occupational Therapy Education       Title: PT OT SLP Therapies (Done)       Topic: Occupational Therapy (Done)       Point: ADL training (Done)       Description:   Instruct learner(s) on proper safety adaptation and remediation techniques during self care or transfers.   Instruct in proper use of assistive devices.                  Learning Progress Summary             Patient Acceptance, E,TB, VU by MS at 6/16/2024 1425                         Point: Precautions (Done)       Description:   Instruct learner(s) on prescribed precautions during self-care and functional transfers.                  Learning Progress Summary             Patient Acceptance, E,TB, VU by MS at 6/16/2024 1425                         Point: Body mechanics (Done)       Description:   Instruct learner(s) on proper positioning and spine alignment during self-care, functional mobility activities and/or exercises.                  Learning Progress Summary             Patient Acceptance, E,TB, VU by MS at 6/16/2024 1425                                         User Key       Initials Effective Dates Name Provider Type Discipline    MS 07/13/22 -  Amparo Smith OT Occupational Therapist OT                  OT Recommendation and Plan  Planned Therapy Interventions (OT): activity tolerance training, adaptive equipment training, BADL retraining, functional balance retraining, IADL retraining, occupation/activity based interventions, passive ROM/stretching, patient/caregiver  education/training, transfer/mobility retraining, strengthening exercise, ROM/therapeutic exercise  Plan of Care Review  Plan of Care Reviewed With: patient  Progress: no change  Outcome Evaluation: Pt is a 64 y/o M admitted to [facility] 6/14/24 with c/o LLE numbness and BLE weakness, and slurred speech; dx TIA. At baseline pt resides with spouse in ground floor apartment [address]. Pt typically ambulates with quad cane for short distance, electric scooter long distance, active . Pt spouse assist with socks, shoes, and threading pants. Pt had x3 falls 6/14/24, additional 7 falls within 3 mos, unable to get up once down. This date pt A&Ox4, on 2L O2 and in supine upon arrival. Pt titrated to RA, saturating WNL throughout, placed back on 2L post session. Pt requires min A supine to sit, max A x2 RW to come to standing and mod A x2 RW to transfer from bed to chair. Pt tremulous when on feet, appears to have global BLE weakness, limiting further mobility. Pt requires max A to don socks and to don pants. Pt functioning far below baseline and with significant hx of falls, pt is not safe to dc home at this time. OT recomending SNF when medically appropriate for dc, OT will follow while admitted.     Time Calculation:                   Amparo Smith OT  6/16/2024

## 2024-06-16 NOTE — PLAN OF CARE
Goal Outcome Evaluation:                        Problem: Adult Inpatient Plan of Care  Goal: Plan of Care Review  Outcome: Ongoing, Progressing  Goal: Patient-Specific Goal (Individualized)  Outcome: Ongoing, Progressing  Goal: Absence of Hospital-Acquired Illness or Injury  Outcome: Ongoing, Progressing  Intervention: Identify and Manage Fall Risk  Recent Flowsheet Documentation  Taken 6/16/2024 1200 by Ronal Shane RN  Safety Promotion/Fall Prevention:   activity supervised   assistive device/personal items within reach   clutter free environment maintained   fall prevention program maintained   nonskid shoes/slippers when out of bed   room organization consistent   safety round/check completed  Intervention: Prevent Skin Injury  Recent Flowsheet Documentation  Taken 6/16/2024 1200 by Ronal Shane RN  Body Position: position changed independently  Skin Protection:   incontinence pads utilized   adhesive use limited   transparent dressing maintained   tubing/devices free from skin contact  Intervention: Prevent and Manage VTE (Venous Thromboembolism) Risk  Recent Flowsheet Documentation  Taken 6/16/2024 1200 by Ronal Shane RN  Activity Management: activity encouraged  VTE Prevention/Management:   bilateral   sequential compression devices off   patient refused intervention  Intervention: Prevent Infection  Recent Flowsheet Documentation  Taken 6/16/2024 1200 by Ronal Shane RN  Infection Prevention:   environmental surveillance performed   hand hygiene promoted   rest/sleep promoted   single patient room provided  Goal: Optimal Comfort and Wellbeing  Outcome: Ongoing, Progressing  Intervention: Provide Person-Centered Care  Recent Flowsheet Documentation  Taken 6/16/2024 1200 by Ronal Shane RN  Trust Relationship/Rapport:   care explained   choices provided   emotional support provided   empathic listening provided   questions encouraged   reassurance provided   thoughts/feelings  acknowledged   questions answered  Goal: Readiness for Transition of Care  Outcome: Ongoing, Progressing     Problem: Skin Injury Risk Increased  Goal: Skin Health and Integrity  Outcome: Ongoing, Progressing  Intervention: Optimize Skin Protection  Recent Flowsheet Documentation  Taken 6/16/2024 1200 by Ronal Shane RN  Pressure Reduction Techniques:   frequent weight shift encouraged   weight shift assistance provided  Head of Bed (HOB) Positioning: HOB elevated  Pressure Reduction Devices: pressure-redistributing mattress utilized  Skin Protection:   incontinence pads utilized   adhesive use limited   transparent dressing maintained   tubing/devices free from skin contact     Problem: COPD (Chronic Obstructive Pulmonary Disease) Comorbidity  Goal: Maintenance of COPD Symptom Control  Outcome: Ongoing, Progressing  Intervention: Maintain COPD-Symptom Control  Recent Flowsheet Documentation  Taken 6/16/2024 1200 by Ronal Shane RN  Supportive Measures: active listening utilized  Medication Review/Management: medications reviewed     Problem: Diabetes Comorbidity  Goal: Blood Glucose Level Within Targeted Range  Outcome: Ongoing, Progressing  Intervention: Monitor and Manage Glycemia  Recent Flowsheet Documentation  Taken 6/16/2024 1200 by Ronal Shane RN  Glycemic Management: blood glucose monitored     Problem: Hypertension Comorbidity  Goal: Blood Pressure in Desired Range  Outcome: Ongoing, Progressing  Intervention: Maintain Blood Pressure Management  Recent Flowsheet Documentation  Taken 6/16/2024 1200 by Ronal Shane RN  Medication Review/Management: medications reviewed     Problem: Obstructive Sleep Apnea Risk or Actual Comorbidity Management  Goal: Unobstructed Breathing During Sleep  Outcome: Ongoing, Progressing     Problem: Fall Injury Risk  Goal: Absence of Fall and Fall-Related Injury  Outcome: Ongoing, Progressing  Intervention: Identify and Manage Contributors  Recent Flowsheet  Documentation  Taken 6/16/2024 1200 by Ronal Shane, RN  Medication Review/Management: medications reviewed  Self-Care Promotion:   BADL personal objects within reach   independence encouraged   BADL personal routines maintained  Intervention: Promote Injury-Free Environment  Recent Flowsheet Documentation  Taken 6/16/2024 1200 by Ronal Shane, RN  Safety Promotion/Fall Prevention:   activity supervised   assistive device/personal items within reach   clutter free environment maintained   fall prevention program maintained   nonskid shoes/slippers when out of bed   room organization consistent   safety round/check completed

## 2024-06-17 ENCOUNTER — APPOINTMENT (OUTPATIENT)
Dept: CT IMAGING | Facility: HOSPITAL | Age: 64
End: 2024-06-17
Payer: MEDICARE

## 2024-06-17 LAB
ALBUMIN SERPL-MCNC: 4.1 G/DL (ref 3.5–5.2)
ALBUMIN/GLOB SERPL: 1.2 G/DL
ALP SERPL-CCNC: 85 U/L (ref 39–117)
ALT SERPL W P-5'-P-CCNC: 7 U/L (ref 1–41)
ANION GAP SERPL CALCULATED.3IONS-SCNC: 10.8 MMOL/L (ref 5–15)
AST SERPL-CCNC: 17 U/L (ref 1–40)
BASOPHILS # BLD AUTO: 0.03 10*3/MM3 (ref 0–0.2)
BASOPHILS NFR BLD AUTO: 0.3 % (ref 0–1.5)
BILIRUB SERPL-MCNC: 0.3 MG/DL (ref 0–1.2)
BUN SERPL-MCNC: 20 MG/DL (ref 8–23)
BUN/CREAT SERPL: 18.3 (ref 7–25)
CALCIUM SPEC-SCNC: 9.8 MG/DL (ref 8.6–10.5)
CHLORIDE SERPL-SCNC: 94 MMOL/L (ref 98–107)
CO2 SERPL-SCNC: 28.2 MMOL/L (ref 22–29)
CREAT SERPL-MCNC: 1.09 MG/DL (ref 0.76–1.27)
DEPRECATED RDW RBC AUTO: 41.9 FL (ref 37–54)
EGFRCR SERPLBLD CKD-EPI 2021: 75.8 ML/MIN/1.73
EOSINOPHIL # BLD AUTO: 0.14 10*3/MM3 (ref 0–0.4)
EOSINOPHIL NFR BLD AUTO: 1.4 % (ref 0.3–6.2)
ERYTHROCYTE [DISTWIDTH] IN BLOOD BY AUTOMATED COUNT: 12.8 % (ref 12.3–15.4)
GLOBULIN UR ELPH-MCNC: 3.3 GM/DL
GLUCOSE BLDC GLUCOMTR-MCNC: 250 MG/DL (ref 70–105)
GLUCOSE BLDC GLUCOMTR-MCNC: 265 MG/DL (ref 70–105)
GLUCOSE BLDC GLUCOMTR-MCNC: 300 MG/DL (ref 70–105)
GLUCOSE BLDC GLUCOMTR-MCNC: 369 MG/DL (ref 70–105)
GLUCOSE SERPL-MCNC: 239 MG/DL (ref 65–99)
HCT VFR BLD AUTO: 38.6 % (ref 37.5–51)
HGB BLD-MCNC: 12.3 G/DL (ref 13–17.7)
IMM GRANULOCYTES # BLD AUTO: 0.04 10*3/MM3 (ref 0–0.05)
IMM GRANULOCYTES NFR BLD AUTO: 0.4 % (ref 0–0.5)
LYMPHOCYTES # BLD AUTO: 2.94 10*3/MM3 (ref 0.7–3.1)
LYMPHOCYTES NFR BLD AUTO: 30.3 % (ref 19.6–45.3)
MCH RBC QN AUTO: 28.1 PG (ref 26.6–33)
MCHC RBC AUTO-ENTMCNC: 31.9 G/DL (ref 31.5–35.7)
MCV RBC AUTO: 88.1 FL (ref 79–97)
MONOCYTES # BLD AUTO: 0.75 10*3/MM3 (ref 0.1–0.9)
MONOCYTES NFR BLD AUTO: 7.7 % (ref 5–12)
NEUTROPHILS NFR BLD AUTO: 5.8 10*3/MM3 (ref 1.7–7)
NEUTROPHILS NFR BLD AUTO: 59.9 % (ref 42.7–76)
NRBC BLD AUTO-RTO: 0 /100 WBC (ref 0–0.2)
PLATELET # BLD AUTO: 340 10*3/MM3 (ref 140–450)
PMV BLD AUTO: 10.4 FL (ref 6–12)
POTASSIUM SERPL-SCNC: 4.5 MMOL/L (ref 3.5–5.2)
PROT SERPL-MCNC: 7.4 G/DL (ref 6–8.5)
QT INTERVAL: 405 MS
QTC INTERVAL: 509 MS
RBC # BLD AUTO: 4.38 10*6/MM3 (ref 4.14–5.8)
SODIUM SERPL-SCNC: 133 MMOL/L (ref 136–145)
WBC NRBC COR # BLD AUTO: 9.7 10*3/MM3 (ref 3.4–10.8)

## 2024-06-17 PROCEDURE — 94799 UNLISTED PULMONARY SVC/PX: CPT

## 2024-06-17 PROCEDURE — 25010000002 HEPARIN (PORCINE) PER 1000 UNITS: Performed by: STUDENT IN AN ORGANIZED HEALTH CARE EDUCATION/TRAINING PROGRAM

## 2024-06-17 PROCEDURE — 25810000003 SODIUM CHLORIDE 0.9 % SOLUTION: Performed by: STUDENT IN AN ORGANIZED HEALTH CARE EDUCATION/TRAINING PROGRAM

## 2024-06-17 PROCEDURE — 94660 CPAP INITIATION&MGMT: CPT

## 2024-06-17 PROCEDURE — 25010000002 HYDRALAZINE PER 20 MG: Performed by: HOSPITALIST

## 2024-06-17 PROCEDURE — 80053 COMPREHEN METABOLIC PANEL: CPT | Performed by: HOSPITALIST

## 2024-06-17 PROCEDURE — 63710000001 INSULIN LISPRO (HUMAN) PER 5 UNITS: Performed by: STUDENT IN AN ORGANIZED HEALTH CARE EDUCATION/TRAINING PROGRAM

## 2024-06-17 PROCEDURE — 82948 REAGENT STRIP/BLOOD GLUCOSE: CPT

## 2024-06-17 PROCEDURE — 97116 GAIT TRAINING THERAPY: CPT

## 2024-06-17 PROCEDURE — 63710000001 INSULIN ISOPHANE & REGULAR PER 5 UNITS: Performed by: HOSPITALIST

## 2024-06-17 PROCEDURE — 72192 CT PELVIS W/O DYE: CPT

## 2024-06-17 PROCEDURE — 85025 COMPLETE CBC W/AUTO DIFF WBC: CPT | Performed by: HOSPITALIST

## 2024-06-17 PROCEDURE — 63710000001 INSULIN GLARGINE PER 5 UNITS: Performed by: STUDENT IN AN ORGANIZED HEALTH CARE EDUCATION/TRAINING PROGRAM

## 2024-06-17 PROCEDURE — 97530 THERAPEUTIC ACTIVITIES: CPT

## 2024-06-17 RX ORDER — AMLODIPINE BESYLATE 5 MG/1
10 TABLET ORAL
Status: DISCONTINUED | OUTPATIENT
Start: 2024-06-18 | End: 2024-06-18 | Stop reason: HOSPADM

## 2024-06-17 RX ORDER — LOSARTAN POTASSIUM 50 MG/1
100 TABLET ORAL
Status: DISCONTINUED | OUTPATIENT
Start: 2024-06-17 | End: 2024-06-18 | Stop reason: HOSPADM

## 2024-06-17 RX ORDER — FINASTERIDE 5 MG/1
5 TABLET, FILM COATED ORAL DAILY
Status: DISCONTINUED | OUTPATIENT
Start: 2024-06-18 | End: 2024-06-18 | Stop reason: HOSPADM

## 2024-06-17 RX ORDER — DEXTROSE MONOHYDRATE 25 G/50ML
10-50 INJECTION, SOLUTION INTRAVENOUS
Status: DISCONTINUED | OUTPATIENT
Start: 2024-06-17 | End: 2024-06-18 | Stop reason: HOSPADM

## 2024-06-17 RX ORDER — BACLOFEN 10 MG/1
10 TABLET ORAL 3 TIMES DAILY PRN
Status: DISCONTINUED | OUTPATIENT
Start: 2024-06-17 | End: 2024-06-18 | Stop reason: HOSPADM

## 2024-06-17 RX ORDER — ATORVASTATIN CALCIUM 20 MG/1
20 TABLET, FILM COATED ORAL NIGHTLY
Status: DISCONTINUED | OUTPATIENT
Start: 2024-06-17 | End: 2024-06-18 | Stop reason: HOSPADM

## 2024-06-17 RX ORDER — CARVEDILOL 6.25 MG/1
12.5 TABLET ORAL 2 TIMES DAILY WITH MEALS
Status: DISCONTINUED | OUTPATIENT
Start: 2024-06-17 | End: 2024-06-18 | Stop reason: HOSPADM

## 2024-06-17 RX ORDER — LISINOPRIL 5 MG/1
10 TABLET ORAL EVERY 12 HOURS SCHEDULED
Status: DISCONTINUED | OUTPATIENT
Start: 2024-06-17 | End: 2024-06-17

## 2024-06-17 RX ORDER — CELECOXIB 200 MG/1
200 CAPSULE ORAL DAILY
Status: DISCONTINUED | OUTPATIENT
Start: 2024-06-17 | End: 2024-06-18 | Stop reason: HOSPADM

## 2024-06-17 RX ORDER — PREGABALIN 75 MG/1
75 CAPSULE ORAL 2 TIMES DAILY
Status: DISCONTINUED | OUTPATIENT
Start: 2024-06-17 | End: 2024-06-18 | Stop reason: HOSPADM

## 2024-06-17 RX ORDER — AMLODIPINE BESYLATE 5 MG/1
5 TABLET ORAL ONCE
Status: COMPLETED | OUTPATIENT
Start: 2024-06-17 | End: 2024-06-17

## 2024-06-17 RX ORDER — HYDRALAZINE HYDROCHLORIDE 20 MG/ML
10 INJECTION INTRAMUSCULAR; INTRAVENOUS ONCE
Status: COMPLETED | OUTPATIENT
Start: 2024-06-17 | End: 2024-06-17

## 2024-06-17 RX ORDER — INSULIN LISPRO 100 [IU]/ML
1-200 INJECTION, SOLUTION INTRAVENOUS; SUBCUTANEOUS
Status: DISCONTINUED | OUTPATIENT
Start: 2024-06-17 | End: 2024-06-18 | Stop reason: HOSPADM

## 2024-06-17 RX ORDER — NICOTINE POLACRILEX 4 MG
15 LOZENGE BUCCAL
Status: DISCONTINUED | OUTPATIENT
Start: 2024-06-17 | End: 2024-06-18 | Stop reason: HOSPADM

## 2024-06-17 RX ORDER — GLIPIZIDE 5 MG/1
5 TABLET ORAL
Status: DISCONTINUED | OUTPATIENT
Start: 2024-06-17 | End: 2024-06-18 | Stop reason: HOSPADM

## 2024-06-17 RX ORDER — DULOXETIN HYDROCHLORIDE 30 MG/1
60 CAPSULE, DELAYED RELEASE ORAL 2 TIMES DAILY
Status: DISCONTINUED | OUTPATIENT
Start: 2024-06-17 | End: 2024-06-18 | Stop reason: HOSPADM

## 2024-06-17 RX ORDER — IBUPROFEN 600 MG/1
1 TABLET ORAL
Status: DISCONTINUED | OUTPATIENT
Start: 2024-06-17 | End: 2024-06-18 | Stop reason: HOSPADM

## 2024-06-17 RX ORDER — INSULIN LISPRO 100 [IU]/ML
1-200 INJECTION, SOLUTION INTRAVENOUS; SUBCUTANEOUS AS NEEDED
Status: DISCONTINUED | OUTPATIENT
Start: 2024-06-17 | End: 2024-06-18 | Stop reason: HOSPADM

## 2024-06-17 RX ORDER — HYDROCHLOROTHIAZIDE 12.5 MG/1
12.5 TABLET ORAL
Status: DISCONTINUED | OUTPATIENT
Start: 2024-06-17 | End: 2024-06-18 | Stop reason: HOSPADM

## 2024-06-17 RX ORDER — PANTOPRAZOLE SODIUM 40 MG/1
40 TABLET, DELAYED RELEASE ORAL 2 TIMES DAILY
Status: DISCONTINUED | OUTPATIENT
Start: 2024-06-17 | End: 2024-06-18 | Stop reason: HOSPADM

## 2024-06-17 RX ADMIN — HYDRALAZINE HYDROCHLORIDE 10 MG: 20 INJECTION, SOLUTION INTRAMUSCULAR; INTRAVENOUS at 08:46

## 2024-06-17 RX ADMIN — Medication 10 ML: at 08:46

## 2024-06-17 RX ADMIN — PREGABALIN 75 MG: 75 CAPSULE ORAL at 21:01

## 2024-06-17 RX ADMIN — PANTOPRAZOLE SODIUM 40 MG: 40 TABLET, DELAYED RELEASE ORAL at 21:01

## 2024-06-17 RX ADMIN — INSULIN LISPRO 8 UNITS: 100 INJECTION, SOLUTION INTRAVENOUS; SUBCUTANEOUS at 13:05

## 2024-06-17 RX ADMIN — FOLIC ACID 1 MG: 1 TABLET ORAL at 08:45

## 2024-06-17 RX ADMIN — HYDROCHLOROTHIAZIDE 12.5 MG: 12.5 TABLET ORAL at 13:05

## 2024-06-17 RX ADMIN — SODIUM CHLORIDE 1000 ML: 900 INJECTION, SOLUTION INTRAVENOUS at 21:07

## 2024-06-17 RX ADMIN — AMLODIPINE BESYLATE 5 MG: 5 TABLET ORAL at 13:05

## 2024-06-17 RX ADMIN — HEPARIN SODIUM 5000 UNITS: 5000 INJECTION INTRAVENOUS; SUBCUTANEOUS at 21:01

## 2024-06-17 RX ADMIN — Medication 10 ML: at 21:01

## 2024-06-17 RX ADMIN — AMLODIPINE BESYLATE 5 MG: 5 TABLET ORAL at 08:45

## 2024-06-17 RX ADMIN — AMLODIPINE BESYLATE 5 MG: 5 TABLET ORAL at 13:06

## 2024-06-17 RX ADMIN — LISINOPRIL 10 MG: 5 TABLET ORAL at 08:44

## 2024-06-17 RX ADMIN — HYDROCODONE BITARTRATE AND ACETAMINOPHEN 1 TABLET: 10; 325 TABLET ORAL at 17:15

## 2024-06-17 RX ADMIN — CARVEDILOL 12.5 MG: 6.25 TABLET, FILM COATED ORAL at 08:44

## 2024-06-17 RX ADMIN — FINASTERIDE 5 MG: 5 TABLET, FILM COATED ORAL at 08:45

## 2024-06-17 RX ADMIN — LOSARTAN POTASSIUM 100 MG: 50 TABLET, FILM COATED ORAL at 13:05

## 2024-06-17 RX ADMIN — HEPARIN SODIUM 5000 UNITS: 5000 INJECTION INTRAVENOUS; SUBCUTANEOUS at 13:07

## 2024-06-17 RX ADMIN — ATORVASTATIN CALCIUM 20 MG: 20 TABLET, FILM COATED ORAL at 21:01

## 2024-06-17 RX ADMIN — CELECOXIB 200 MG: 200 CAPSULE ORAL at 13:05

## 2024-06-17 RX ADMIN — HEPARIN SODIUM 5000 UNITS: 5000 INJECTION INTRAVENOUS; SUBCUTANEOUS at 08:46

## 2024-06-17 RX ADMIN — ASPIRIN 81 MG CHEWABLE TABLET 81 MG: 81 TABLET CHEWABLE at 08:44

## 2024-06-17 RX ADMIN — CARVEDILOL 12.5 MG: 6.25 TABLET, FILM COATED ORAL at 17:15

## 2024-06-17 RX ADMIN — INSULIN LISPRO 7 UNITS: 100 INJECTION, SOLUTION INTRAVENOUS; SUBCUTANEOUS at 17:15

## 2024-06-17 RX ADMIN — GLIPIZIDE 5 MG: 5 TABLET ORAL at 17:15

## 2024-06-17 RX ADMIN — DULOXETINE HYDROCHLORIDE 60 MG: 30 CAPSULE, DELAYED RELEASE ORAL at 08:45

## 2024-06-17 RX ADMIN — INSULIN LISPRO 6 UNITS: 100 INJECTION, SOLUTION INTRAVENOUS; SUBCUTANEOUS at 08:45

## 2024-06-17 RX ADMIN — INSULIN LISPRO 7 UNITS: 100 INJECTION, SOLUTION INTRAVENOUS; SUBCUTANEOUS at 21:44

## 2024-06-17 RX ADMIN — PANTOPRAZOLE SODIUM 40 MG: 40 TABLET, DELAYED RELEASE ORAL at 13:05

## 2024-06-17 RX ADMIN — INSULIN HUMAN 100 UNITS: 100 INJECTION, SUSPENSION SUBCUTANEOUS at 13:05

## 2024-06-17 RX ADMIN — INSULIN GLARGINE 10 UNITS: 100 INJECTION, SOLUTION SUBCUTANEOUS at 08:45

## 2024-06-17 RX ADMIN — PREGABALIN 75 MG: 75 CAPSULE ORAL at 13:05

## 2024-06-17 RX ADMIN — DULOXETINE HYDROCHLORIDE 60 MG: 30 CAPSULE, DELAYED RELEASE ORAL at 21:01

## 2024-06-17 RX ADMIN — HYDROCODONE BITARTRATE AND ACETAMINOPHEN 1 TABLET: 10; 325 TABLET ORAL at 08:44

## 2024-06-17 RX ADMIN — INSULIN GLARGINE 42 UNITS: 100 INJECTION, SOLUTION SUBCUTANEOUS at 21:00

## 2024-06-17 RX ADMIN — CLOPIDOGREL BISULFATE 75 MG: 75 TABLET ORAL at 08:44

## 2024-06-17 RX ADMIN — DULOXETINE HYDROCHLORIDE 60 MG: 30 CAPSULE, DELAYED RELEASE ORAL at 13:06

## 2024-06-17 NOTE — PLAN OF CARE
Pt presents with functional mobility impairments which indicate the need for skilled intervention. Tolerating session today without incident. Pt on 2L O2 and telemetry this date.  Min A for bed mobility, Mod A x 2 with RW for transfer with cues to widen CHANTAL during standing.  Ambulated 5' with RW with Min/Mod A with flexed trunk, decreased balance, decreased endurance and and narrow CHANTAL.  Completed 10 reps each of BLE strengthening exercises in sitting.  Will continue to follow and progress as tolerated.

## 2024-06-17 NOTE — CONSULTS
"Diabetes Education  Assessment/Teaching    Patient Name:  Matthieu Hawk  YOB: 1960  MRN: [id number]  Admit Date:  6/14/2024      Assessment Date:  6/17/2024  Flowsheet Row Most Recent Value   General Information     Referral From: A1c, Blood glucose, MD order  [MD consult per stroke protocol, admission blood sugar 336, and on 6/15/2024 A1c was 9.68%.]   Height 177.8 cm (70\")   Height Method Actual   Weight 168 kg (370 lb)   Pregnancy Assessment    Diabetes History    What type of diabetes do you have? Type 2   Length of Diabetes Diagnosis 10 + years  [April 10, 2005]   Current DM knowledge fair   Do you test your blood sugar at home? yes   Frequency of checks as often as needs   Meter type Freestyle Mason 2   Who performs the test? patient   Typical readings <70 middle of night to eary morning, 300 or > the rest of the time.   Have you had low blood sugar? (<70mg/dl) yes   How often do you have low blood sugar? frequently   Have you had high blood sugar? (>140mg/dl) yes   How often do you have high blood sugar? frequently   When was your last high blood sugar? Admission blood sugar 336   Education Preferences    What areas of diabetes would you like to learn about? avoiding high blood sugar, avoiding low blood sugar, diabetes complications   Nutrition Information    Assessment Topics    Taking Medication - Assessment Needs education   Problem Solving - Assessment Needs education   Reducing Risk - Assessment Needs education   DM Goals    Taking Medication - Goal Today   Problem Solving - Goal Today   Reducing Risk - Goal Today            Flowsheet Row Most Recent Value   DM Education Needs    Meter Has own   Meter Type Freestyle   Medication Insulin, Other injectables, Vial  [At home patient stated that he takes Humulin 70/30 100 units in the morning and 75 units in the evening adn Trulicity 3 mg weekly on Saturdays.]   Problem Solving Hypoglycemia, Hyperglycemia, Signs, Symptoms, Treatment "   Reducing Risks A1C testing  [On 6/15/2024 A1c was 9.68%.]   Discharge Plan Home   Motivation Moderate   Teaching Method Discussion, Handouts   Patient Response Verbalized understanding  [Wife at bedside and involved with discussion.]              Other Comments:  A1c info sheet given with discussion on A1c target and healthy blood sugar range. Patient stated he doesn't normally eat a bedtime snack and only eats one meal a day which is usually supper. Discussed with patient that he is on a combination insulin and should not be taking unless eating a meal. Patient stated he has been on other insulins in the past and insurance has covered. Instructed patient to eat a bedtime snack containing protein and carbs to help prevent nighttime lows. Patient and wife have no further questions or concerns related to diabetes at this time.        Electronically signed by:  Hanna Valero RN  06/17/24 14:15 EDT

## 2024-06-17 NOTE — PLAN OF CARE
Problem: Adult Inpatient Plan of Care  Goal: Plan of Care Review  Outcome: Ongoing, Progressing  Flowsheets (Taken 6/17/2024 1614)  Progress: no change  Plan of Care Reviewed With: patient  Goal: Patient-Specific Goal (Individualized)  Outcome: Ongoing, Progressing  Goal: Absence of Hospital-Acquired Illness or Injury  Outcome: Ongoing, Progressing  Intervention: Identify and Manage Fall Risk  Recent Flowsheet Documentation  Taken 6/17/2024 1551 by Whitney Caputo LPN  Safety Promotion/Fall Prevention:   assistive device/personal items within reach   clutter free environment maintained   fall prevention program maintained   nonskid shoes/slippers when out of bed   safety round/check completed   room organization consistent  Taken 6/17/2024 1200 by Whitney Caputo LPN  Safety Promotion/Fall Prevention:   assistive device/personal items within reach   clutter free environment maintained   fall prevention program maintained   nonskid shoes/slippers when out of bed   safety round/check completed   room organization consistent  Taken 6/17/2024 1021 by Whitney Caputo LPN  Safety Promotion/Fall Prevention:   assistive device/personal items within reach   clutter free environment maintained   fall prevention program maintained   nonskid shoes/slippers when out of bed   safety round/check completed   room organization consistent  Taken 6/17/2024 0800 by Whitney Caputo LPN  Safety Promotion/Fall Prevention:   assistive device/personal items within reach   clutter free environment maintained   fall prevention program maintained   nonskid shoes/slippers when out of bed   safety round/check completed   room organization consistent  Intervention: Prevent Skin Injury  Recent Flowsheet Documentation  Taken 6/17/2024 1551 by Whitney Caputo LPN  Body Position: position changed independently  Skin Protection:   adhesive use limited   tubing/devices free from skin contact  Taken 6/17/2024 1200 by Whitney Caputo LPN  Body Position:  position changed independently  Skin Protection:   adhesive use limited   tubing/devices free from skin contact  Taken 6/17/2024 0800 by Whitney Caputo LPN  Body Position: position changed independently  Skin Protection:   adhesive use limited   tubing/devices free from skin contact  Intervention: Prevent and Manage VTE (Venous Thromboembolism) Risk  Recent Flowsheet Documentation  Taken 6/17/2024 1551 by Whitney Caputo LPN  VTE Prevention/Management:   bilateral   sequential compression devices off   patient refused intervention  Range of Motion: active ROM (range of motion) encouraged  Taken 6/17/2024 1200 by Whitney Caputo LPN  Activity Management: activity encouraged  VTE Prevention/Management:   bilateral   sequential compression devices off   patient refused intervention  Range of Motion: active ROM (range of motion) encouraged  Taken 6/17/2024 0800 by Whitney Caputo LPN  Activity Management: activity encouraged  VTE Prevention/Management:   bilateral   patient refused intervention   sequential compression devices off  Range of Motion: active ROM (range of motion) encouraged  Intervention: Prevent Infection  Recent Flowsheet Documentation  Taken 6/17/2024 1551 by Whitney Caputo LPN  Infection Prevention:   environmental surveillance performed   hand hygiene promoted   rest/sleep promoted   single patient room provided  Taken 6/17/2024 1200 by Whitney Caputo LPN  Infection Prevention:   environmental surveillance performed   hand hygiene promoted   rest/sleep promoted   single patient room provided  Taken 6/17/2024 1021 by Whitney Caputo LPN  Infection Prevention:   environmental surveillance performed   hand hygiene promoted   rest/sleep promoted   single patient room provided  Taken 6/17/2024 0800 by Whitney Caputo LPN  Infection Prevention:   environmental surveillance performed   hand hygiene promoted   rest/sleep promoted   single patient room provided  Goal: Optimal Comfort and Wellbeing  Outcome:  Ongoing, Progressing  Intervention: Monitor Pain and Promote Comfort  Recent Flowsheet Documentation  Taken 6/17/2024 1551 by Whitney Caputo LPN  Pain Management Interventions: care clustered  Taken 6/17/2024 0800 by Whitney Caputo LPN  Pain Management Interventions: see MAR  Intervention: Provide Person-Centered Care  Recent Flowsheet Documentation  Taken 6/17/2024 1551 by Whitney Caputo LPN  Trust Relationship/Rapport:   care explained   thoughts/feelings acknowledged  Taken 6/17/2024 1200 by Whitney Caputo LPN  Trust Relationship/Rapport:   care explained   thoughts/feelings acknowledged  Taken 6/17/2024 0800 by Whitney Caputo LPN  Trust Relationship/Rapport:   care explained   thoughts/feelings acknowledged  Goal: Readiness for Transition of Care  Outcome: Ongoing, Progressing     Problem: Skin Injury Risk Increased  Goal: Skin Health and Integrity  Outcome: Ongoing, Progressing  Intervention: Optimize Skin Protection  Recent Flowsheet Documentation  Taken 6/17/2024 1551 by Whitney Caputo LPN  Pressure Reduction Techniques: frequent weight shift encouraged  Head of Bed (HOB) Positioning: HOB elevated  Pressure Reduction Devices: pressure-redistributing mattress utilized  Skin Protection:   adhesive use limited   tubing/devices free from skin contact  Taken 6/17/2024 1200 by Whitney Caputo LPN  Pressure Reduction Techniques: frequent weight shift encouraged  Head of Bed (HOB) Positioning: HOB elevated  Pressure Reduction Devices: pressure-redistributing mattress utilized  Skin Protection:   adhesive use limited   tubing/devices free from skin contact  Taken 6/17/2024 0800 by Whitney Caputo LPN  Pressure Reduction Techniques: frequent weight shift encouraged  Head of Bed (HOB) Positioning: HOB elevated  Pressure Reduction Devices: pressure-redistributing mattress utilized  Skin Protection:   adhesive use limited   tubing/devices free from skin contact     Problem: COPD (Chronic Obstructive Pulmonary Disease)  Comorbidity  Goal: Maintenance of COPD Symptom Control  Outcome: Ongoing, Progressing  Intervention: Maintain COPD-Symptom Control  Recent Flowsheet Documentation  Taken 6/17/2024 1551 by Whitney Caputo LPN  Supportive Measures: active listening utilized  Medication Review/Management: medications reviewed  Taken 6/17/2024 1200 by Whitney Caputo LPN  Medication Review/Management: medications reviewed  Taken 6/17/2024 1021 by Whitney Caputo LPN  Medication Review/Management: medications reviewed  Taken 6/17/2024 0800 by Whitney Caputo LPN  Supportive Measures: active listening utilized  Medication Review/Management: medications reviewed     Problem: Diabetes Comorbidity  Goal: Blood Glucose Level Within Targeted Range  Outcome: Ongoing, Progressing  Intervention: Monitor and Manage Glycemia  Recent Flowsheet Documentation  Taken 6/17/2024 1551 by Whitney Caputo LPN  Glycemic Management: blood glucose monitored  Taken 6/17/2024 1200 by Whitney Caputo LPN  Glycemic Management: blood glucose monitored  Taken 6/17/2024 0800 by Whitney Caputo LPN  Glycemic Management: blood glucose monitored     Problem: Hypertension Comorbidity  Goal: Blood Pressure in Desired Range  Outcome: Ongoing, Progressing  Intervention: Maintain Blood Pressure Management  Recent Flowsheet Documentation  Taken 6/17/2024 1551 by Whitney Caputo LPN  Medication Review/Management: medications reviewed  Taken 6/17/2024 1200 by Whitney Caputo LPN  Medication Review/Management: medications reviewed  Taken 6/17/2024 1021 by Whitney Caputo LPN  Medication Review/Management: medications reviewed  Taken 6/17/2024 0800 by Whitney Caputo LPN  Medication Review/Management: medications reviewed     Problem: Obstructive Sleep Apnea Risk or Actual Comorbidity Management  Goal: Unobstructed Breathing During Sleep  Outcome: Ongoing, Progressing     Problem: Fall Injury Risk  Goal: Absence of Fall and Fall-Related Injury  Outcome: Ongoing, Progressing  Intervention:  Identify and Manage Contributors  Recent Flowsheet Documentation  Taken 6/17/2024 1551 by Whitney Caputo LPN  Medication Review/Management: medications reviewed  Taken 6/17/2024 1200 by Whitney Caputo LPN  Medication Review/Management: medications reviewed  Taken 6/17/2024 1021 by Whitney Caputo LPN  Medication Review/Management: medications reviewed  Taken 6/17/2024 0800 by Whitney Caputo LPN  Medication Review/Management: medications reviewed  Intervention: Promote Injury-Free Environment  Recent Flowsheet Documentation  Taken 6/17/2024 1551 by Whitney Caputo LPN  Safety Promotion/Fall Prevention:   assistive device/personal items within reach   clutter free environment maintained   fall prevention program maintained   nonskid shoes/slippers when out of bed   safety round/check completed   room organization consistent  Taken 6/17/2024 1200 by Whitney Caputo LPN  Safety Promotion/Fall Prevention:   assistive device/personal items within reach   clutter free environment maintained   fall prevention program maintained   nonskid shoes/slippers when out of bed   safety round/check completed   room organization consistent  Taken 6/17/2024 1021 by Whitney Caputo LPN  Safety Promotion/Fall Prevention:   assistive device/personal items within reach   clutter free environment maintained   fall prevention program maintained   nonskid shoes/slippers when out of bed   safety round/check completed   room organization consistent  Taken 6/17/2024 0800 by Whitney Caputo LPN  Safety Promotion/Fall Prevention:   assistive device/personal items within reach   clutter free environment maintained   fall prevention program maintained   nonskid shoes/slippers when out of bed   safety round/check completed   room organization consistent     Problem: Noninvasive Ventilation Acute  Goal: Effective Unassisted Ventilation and Oxygenation  Outcome: Ongoing, Progressing   Goal Outcome Evaluation:  Plan of Care Reviewed With: patient         Progress: no change

## 2024-06-17 NOTE — CONSULTS
Patient reports being anxious/discouraged due to his limitations with his mobility. Patient reports not wanting others to have to help him and wants to maintain independence as long as possible. Patient is [employer] and that his spiritual beliefs give him encouragement. Prayed with patient over topics discussed.     Will continue to follow.    chip Isabel

## 2024-06-17 NOTE — PLAN OF CARE
Problem: Adult Inpatient Plan of Care  Goal: Plan of Care Review  Outcome: Ongoing, Progressing  Goal: Patient-Specific Goal (Individualized)  Outcome: Ongoing, Progressing  Goal: Absence of Hospital-Acquired Illness or Injury  Outcome: Ongoing, Progressing  Intervention: Identify and Manage Fall Risk  Recent Flowsheet Documentation  Taken 6/17/2024 0141 by Angeles Kendrick LPN  Safety Promotion/Fall Prevention:   activity supervised   assistive device/personal items within reach  Taken 6/17/2024 0000 by Angeles Kendrick LPN  Safety Promotion/Fall Prevention:   activity supervised   assistive device/personal items within reach  Taken 6/16/2024 2216 by Angeles Kendrick LPN  Safety Promotion/Fall Prevention:   activity supervised   assistive device/personal items within reach  Taken 6/16/2024 2000 by Angeles Kendrick LPN  Safety Promotion/Fall Prevention: safety round/check completed  Intervention: Prevent Skin Injury  Recent Flowsheet Documentation  Taken 6/17/2024 0000 by Angeles Kendrick LPN  Body Position: position changed independently  Taken 6/16/2024 2000 by Angeles Kendrick LPN  Body Position: position changed independently  Skin Protection:   adhesive use limited   tubing/devices free from skin contact  Intervention: Prevent and Manage VTE (Venous Thromboembolism) Risk  Recent Flowsheet Documentation  Taken 6/17/2024 0000 by Angeles Kendrick LPN  Activity Management: activity encouraged  VTE Prevention/Management:   bilateral   sequential compression devices off   patient refused intervention  Range of Motion: active ROM (range of motion) encouraged  Taken 6/16/2024 2000 by Angeles Kendrick LPN  Activity Management: activity encouraged  VTE Prevention/Management:   bilateral   sequential compression devices off   patient refused intervention  Intervention: Prevent Infection  Recent Flowsheet Documentation  Taken 6/17/2024 0141 by Angeles Kendrick LPN  Infection Prevention:   environmental surveillance  performed   equipment surfaces disinfected  Taken 6/17/2024 0000 by Angeles Kendrick LPN  Infection Prevention:   environmental surveillance performed   equipment surfaces disinfected  Taken 6/16/2024 2216 by Angeles Kendrick LPN  Infection Prevention:   environmental surveillance performed   equipment surfaces disinfected  Taken 6/16/2024 2000 by Angeles Kendrick LPN  Infection Prevention:   environmental surveillance performed   equipment surfaces disinfected  Goal: Optimal Comfort and Wellbeing  Outcome: Ongoing, Progressing  Goal: Readiness for Transition of Care  Outcome: Ongoing, Progressing     Problem: Skin Injury Risk Increased  Goal: Skin Health and Integrity  Outcome: Ongoing, Progressing  Intervention: Optimize Skin Protection  Recent Flowsheet Documentation  Taken 6/17/2024 0000 by Angeles Kendrick LPN  Head of Bed (HOB) Positioning: HOB elevated  Taken 6/16/2024 2000 by Angeles Kendrick LPN  Pressure Reduction Techniques: frequent weight shift encouraged  Head of Bed (HOB) Positioning: HOB elevated  Pressure Reduction Devices: pressure-redistributing mattress utilized  Skin Protection:   adhesive use limited   tubing/devices free from skin contact     Problem: COPD (Chronic Obstructive Pulmonary Disease) Comorbidity  Goal: Maintenance of COPD Symptom Control  Outcome: Ongoing, Progressing  Intervention: Maintain COPD-Symptom Control  Recent Flowsheet Documentation  Taken 6/17/2024 0141 by Angeles Kendrick LPN  Medication Review/Management: medications reviewed  Taken 6/17/2024 0000 by Angeles Kendrick LPN  Medication Review/Management: medications reviewed  Taken 6/16/2024 2216 by Angeles Kendrick LPN  Medication Review/Management: medications reviewed  Taken 6/16/2024 2000 by Angeles Kendrick LPN  Medication Review/Management: medications reviewed     Problem: Diabetes Comorbidity  Goal: Blood Glucose Level Within Targeted Range  Outcome: Ongoing, Progressing     Problem: Hypertension  Comorbidity  Goal: Blood Pressure in Desired Range  Outcome: Ongoing, Progressing  Intervention: Maintain Blood Pressure Management  Recent Flowsheet Documentation  Taken 6/17/2024 0141 by Angeles Kendrick LPN  Medication Review/Management: medications reviewed  Taken 6/17/2024 0000 by Angeles Kendrick LPN  Medication Review/Management: medications reviewed  Taken 6/16/2024 2216 by Angeles Kendrick LPN  Medication Review/Management: medications reviewed  Taken 6/16/2024 2000 by Angeles Kendrick LPN  Medication Review/Management: medications reviewed     Problem: Obstructive Sleep Apnea Risk or Actual Comorbidity Management  Goal: Unobstructed Breathing During Sleep  Outcome: Ongoing, Progressing     Problem: Fall Injury Risk  Goal: Absence of Fall and Fall-Related Injury  Outcome: Ongoing, Progressing  Intervention: Identify and Manage Contributors  Recent Flowsheet Documentation  Taken 6/17/2024 0141 by Angeles Kendrick LPN  Medication Review/Management: medications reviewed  Taken 6/17/2024 0000 by Angeles Kendrick LPN  Medication Review/Management: medications reviewed  Taken 6/16/2024 2216 by Angeles Kendrick LPN  Medication Review/Management: medications reviewed  Taken 6/16/2024 2000 by Angeles Kendrick LPN  Medication Review/Management: medications reviewed  Intervention: Promote Injury-Free Environment  Recent Flowsheet Documentation  Taken 6/17/2024 0141 by Angeles Kendrick LPN  Safety Promotion/Fall Prevention:   activity supervised   assistive device/personal items within reach  Taken 6/17/2024 0000 by Angeles Kendrick LPN  Safety Promotion/Fall Prevention:   activity supervised   assistive device/personal items within reach  Taken 6/16/2024 2216 by Angeles Kendrick LPN  Safety Promotion/Fall Prevention:   activity supervised   assistive device/personal items within reach  Taken 6/16/2024 2000 by Angeles Kendrick LPN  Safety Promotion/Fall Prevention: safety round/check completed     Problem: Noninvasive  Ventilation Acute  Goal: Effective Unassisted Ventilation and Oxygenation  Outcome: Ongoing, Progressing   Goal Outcome Evaluation:

## 2024-06-17 NOTE — CONSULTS
Nutrition Services    Patient Name: Matthieu Hawk  YOB: 1960  MRN: [id number]  Admission date: 6/14/2024    NUTRITION SCREENING      Encounter Information: Check on for MST of 2.  Admitted for bilateral lower extremity weakness and left lower extremity numbness.  Other conditions include diabetic neuropathy, HTN, depression, chronic pain, ENOC, DM.  Being followed by Neurology.  CVA ruled out.  Working with PT/OT.  Diabetes educator consulted.  RD visited patient at bedside.  Patient reports good PO intakes, no N/V, no chewing/swallowing issues noted, no recent weight loss, does not drink Boost/Ensure.         PO Diet: Diet: Cardiac, Diabetic; Healthy Heart (2-3 Na+); Consistent Carbohydrate; Fluid Consistency: Thin (IDDSI 0)   PO Supplements: None ordered    PO Intake:  79% average PO intakes x 7 documented meals since admission        Labs (reviewed below): Reviewed, management per attending        GI Function:  Last documented BM 6/14 (x 3 days ago)       Skin: Intact        Weight Hx Review: Wt Readings from Last 40 Encounters:   06/14/24 (!) 168 kg (370 lb)          Nutrition Intervention: Continue current diet and encourage good PO intakes.       Results from last 7 days   Lab Units 06/17/24  0431 06/16/24  0124 06/15/24  0340 06/14/24  2138   SODIUM mmol/L 133* 134* 134* 134*   POTASSIUM mmol/L 4.5 4.5 4.9 4.5   CHLORIDE mmol/L 94* 95* 94* 92*   CO2 mmol/L 28.2 30.8* 28.3 29.8*   BUN mg/dL 20 25* 32* 30*   CREATININE mg/dL 1.09 1.14 1.76* 2.10*   CALCIUM mg/dL 9.8 9.5 9.3 9.4   BILIRUBIN mg/dL 0.3  --  0.2 0.2   ALK PHOS U/L 85  --  91 96   ALT (SGPT) U/L 7  --  8 9   AST (SGOT) U/L 17  --  17 14   GLUCOSE mg/dL 239* 270* 322* 339*     Results from last 7 days   Lab Units 06/17/24  0431 06/16/24  0124 06/15/24  0340   MAGNESIUM mg/dL  --   --  2.2   PHOSPHORUS mg/dL  --   --  4.9*   HEMOGLOBIN g/dL 12.3*   < > 12.7*   HEMATOCRIT % 38.6   < > 40.4   TRIGLYCERIDES mg/dL  --   --  257*    < > =  "values in this interval not displayed.     No results found for: \"COVID19\"  Lab Results   Component Value Date    HGBA1C 9.68 (H) 06/15/2024       RD to follow up per protocol.    Electronically signed by:  Monet Ivy RD  06/17/24 13:04 EDT    "

## 2024-06-17 NOTE — SIGNIFICANT NOTE
Case Management/Social Work    Patient Name:  Matthieu Hawk  YOB: 1960  MRN: [id number]  Admit Date:  6/14/2024 06/17/24 1335   Post Acute Pre-Cert Documentation   Request Submitted by Facility - Type: Hospital   Post-Acute Authorization Type Submitted: SNF   Date Post Acute Pre-Cert Inititated per Facility 06/17/24   Verification from Payer Yes   Date Post Acute Pre-Cert Completed 06/17/24   Accepting Facility [facility]   Hospital Discharge Date Requested 06/18/24   All Clinicals Submitted? Yes   Had Accepting Facility at Time of Submission Yes   Response Received from Insurance? Approval   Response Communicated to:    Authorization Number: [id number]   Post Acute Pre-Cert Initiated Comment LETICIA submitted SNF precert for [facility] via [employer]. Authorization ID [id number]. SNF precert auto approved. Valid 6/18-6/25. Approval letter indexed to media. CM made aware.           Electronically signed by:  Kelvin Yang CMA  06/17/24 13:42 EDT    Kelvin Yang  Case Management Associate  [address] [address]  P: [phone]  F: [phone]

## 2024-06-17 NOTE — THERAPY TREATMENT NOTE
"Subjective: Pt agreeable to therapeutic plan of care.    Objective:     Bed mobility -  Min A at trunk   Transfers -  Mod A x 2 with RW with narrow CHANTAL  Ambulation -  5' Min/Mod A x 2 with RW    Therapeutic Exercise -  Completed 10 reps each of BLE strengthening exercises in sitting: AP, LAQs, hip flexion, GS with rest breaks needed after 5 reps of each exercise    Vitals: WNL    Pain: 8 VAS   Location: back  Intervention for pain: Repositioned, Increased Activity, and Therapeutic Presence    Education: Provided education on the importance of mobility in the acute care setting, Verbal/Tactile Cues, Transfer Training, Gait Training, and Energy conservation strategies    Assessment: Matthieu gacria    Plan/Recommendations:   If medically appropriate, Moderate Intensity Therapy recommended post-acute care. This is recommended as therapy feels the patient would require 3-4 days per week and wouldn't tolerate \"3 hour daily\" rehab intensity. SNF would be the preferred choice. If the patient does not agree to SNF, arrange HH or OP depending on home bound status. If patient is medically complex, consider LTACH. Pt requires no DME at discharge.     Pt desires Skilled Rehab placement at discharge. Pt cooperative; agreeable to therapeutic recommendations and plan of care.       Modified Kimble: 4 = Moderately severe disability (Unable to attend to own bodily needs without assistance, and unable to walk unassisted)     Post-Tx Position: Up in Chair, Alarms activated, and Call light and personal items within reach  PPE: gloves   "

## 2024-06-17 NOTE — DISCHARGE PLACEMENT REQUEST
"Dorothy Hawk (63 y.o. Male)       Date of Birth   1960    Social Security Number       Address   [address]  [address] [place] [facility]    Home Phone   [phone]    MRN   [id number]       Voodoo   Non-Judaism    Marital Status                               Admission Date   24    Admission Type   Emergency    Admitting Provider   Llanes Alvarez, Carlos, MD    Attending Provider   Brammell, Timothy Duane, MD    Department, Room/Bed   [facility], 250/1       Discharge Date       Discharge Disposition       Discharge Destination                                 Attending Provider: Brammell, Timothy Duane, MD    Allergies: Codeine    Isolation: None   Infection: None   Code Status: CPR    Ht: 177.8 cm (70\")   Wt: 168 kg (370 lb)    Admission Cmt: None   Principal Problem: TIA (transient ischemic attack) [G45.9]                   Active Insurance as of 2024       Primary Coverage       Payor Plan Insurance Group Employer/Plan Group    ANTHEM MEDICARE REPLACEMENT ANTHEM MEDICARE ADVANTAGE [id number]       Payor Plan Address Payor Plan Phone Number Payor Plan Fax Number Effective Dates    [address] [phone]  3/1/2024 - None Entered    [facility] [id number]         Subscriber Name Subscriber Birth Date Member ID       DOROTHY HAWK 1960 [id number]                     Emergency Contacts        (Rel.) Home Phone Work Phone Mobile Phone    CLYDE HAWK (Spouse) -- -- [phone]    herman morris (Sister) -- -- [phone]                 History & Physical        Llanes Alvarez, Carlos, MD at 06/15/24 [address] [place] [address] Medicine Services  History & Physical    Patient Name: Dorothy Hawk  : 1960  MRN: [id number]  Primary Care Physician:  Provider, No Known  Date of admission: 2024  Date and Time of Service: 2024 at 12:15 am    Subjective      Chief Complaint: Bilateral LE weakness, LLE numbness, decreased " alertness, slurred speech.     History of Present Illness: Matthieu Hawk is a 63 y.o. male with a PMH of type 2 diabetes, diabetic neuropathy, hypertension, morbid obesity, MANA,GERD, asthma, chronic pain syndrome on opioids, major depressive disorder who presented to [facility] on 6/14/2024 with bilateral lower extremity weakness that has been ongoing for the past couple of days and left lower extremity numbness that he noticed today around noon.  History is obtained from patient at bedside and his wife however patient is a poor historian.  As per wife patient has also been having decreased alertness and slurred speech since earlier today.  Code stroke was called in the ER however patient was already out of the window for thrombolytic therapy.  Patient had a CT angio head and neck which did not show any large vessel occlusion.  Brain MRI did not show acute intracranial abnormality.  Chemistry labs in the ER notable for elevated creatinine 2.1, mild hyponatremia 134, troponin mildly elevated at bedtime 27.  CBC labs notable for mild elevated white blood cell 11.02, hemoglobin 12.7 otherwise unremarkable.  EKG in sinus rhythm.  The decision to admit was made.          Review of Systems   Constitutional:  Positive for fatigue. Negative for chills, diaphoresis and fever.   HENT:  Negative for dental problem, facial swelling, mouth sores, postnasal drip, sinus pain, sneezing and tinnitus.    Eyes:  Negative for photophobia and itching.   Respiratory:  Negative for cough, chest tightness and stridor.    Cardiovascular:  Negative for leg swelling.   Gastrointestinal:  Negative for abdominal pain, blood in stool, diarrhea and rectal pain.   Genitourinary:  Negative for dysuria, flank pain, hematuria, penile pain and urgency.   Musculoskeletal:  Positive for arthralgias, back pain, gait problem, joint swelling and myalgias.   Skin:  Negative for pallor.   Neurological:  Positive for speech difficulty and numbness.  Negative for dizziness and facial asymmetry.   Psychiatric/Behavioral:  Negative for behavioral problems, decreased concentration, hallucinations and sleep disturbance. The patient is not nervous/anxious.        Personal History     No past medical history on file.    No past surgical history on file.    Family History: family history is not on file. Otherwise pertinent FHx was reviewed and not pertinent to current issue.    Social History:      Home Medications:  Prior to Admission Medications       None              Allergies:  Allergies   Allergen Reactions    Codeine Itching and Rash       Objective      Vitals:   Temp:  [97.7 °F (36.5 °C)] 97.7 °F (36.5 °C)  Heart Rate:  [] 92  Resp:  [18] 18  BP: ()/(57-95) 146/85  Body mass index is 53.09 kg/m².  Physical Exam  Constitutional:       General: He is not in acute distress.     Appearance: Normal appearance. He is obese. He is not toxic-appearing.   HENT:      Head: Atraumatic.      Nose: Nose normal.      Mouth/Throat:      Mouth: Mucous membranes are moist. Mucous membranes are dry.   Eyes:      Extraocular Movements: Extraocular movements intact.      Pupils: Pupils are equal, round, and reactive to light.   Cardiovascular:      Rate and Rhythm: Normal rate and regular rhythm.      Pulses: Normal pulses.      Heart sounds: Normal heart sounds.   Pulmonary:      Effort: Pulmonary effort is normal.      Breath sounds: Normal breath sounds.   Abdominal:      General: Abdomen is flat.      Palpations: Abdomen is soft.   Musculoskeletal:         General: Normal range of motion.      Cervical back: Neck supple.      Right lower leg: No edema.      Left lower leg: No edema.   Skin:     General: Skin is dry.      Capillary Refill: Capillary refill takes less than 2 seconds.      Coloration: Skin is not jaundiced.   Neurological:      General: No focal deficit present.      Mental Status: He is alert and oriented to person, place, and time.      GCS: GCS  eye subscore is 4. GCS verbal subscore is 5. GCS motor subscore is 6.      Cranial Nerves: No cranial nerve deficit or facial asymmetry.      Comments: Alert and awake, oriented to self space and person.  No facial asymmetry.  He has slow speech, bradylalia.  Finger-to-nose maneuver normal.  Strength bilateral UE/LE 5/5.  Sensation slightly decreased to pinprick touch left lower extremity below knee.  Patient did not walk.         Diagnostic Data:  Lab Results (last 24 hours)       Procedure Component Value Units Date/Time    Comprehensive Metabolic Panel [[id number]]  (Abnormal) Collected: 06/14/24 2138    Specimen: Blood Updated: 06/14/24 2212     Glucose 339 mg/dL      BUN 30 mg/dL      Creatinine 2.10 mg/dL      Sodium 134 mmol/L      Potassium 4.5 mmol/L      Chloride 92 mmol/L      CO2 29.8 mmol/L      Calcium 9.4 mg/dL      Total Protein 7.3 g/dL      Albumin 4.2 g/dL      ALT (SGPT) 9 U/L      AST (SGOT) 14 U/L      Alkaline Phosphatase 96 U/L      Total Bilirubin 0.2 mg/dL      Globulin 3.1 gm/dL      A/G Ratio 1.4 g/dL      BUN/Creatinine Ratio 14.3     Anion Gap 12.2 mmol/L      eGFR 34.5 mL/min/1.73     Narrative:      GFR Normal >60  Chronic Kidney Disease <60  Kidney Failure <15      Single High Sensitivity Troponin T [[id number]]  (Abnormal) Collected: 06/14/24 2138    Specimen: Blood Updated: 06/14/24 2212     HS Troponin T 27 ng/L     Narrative:      High Sensitive Troponin T Reference Range:  <14.0 ng/L- Negative Female for AMI  <22.0 ng/L- Negative Male for AMI  >=14 - Abnormal Female indicating possible myocardial injury.  >=22 - Abnormal Male indicating possible myocardial injury.   Clinicians would have to utilize clinical acumen, EKG, Troponin, and serial changes to determine if it is an Acute Myocardial Infarction or myocardial injury due to an underlying chronic condition.         Protime-INR [[id number]]  (Abnormal) Collected: 06/14/24 2138    Specimen: Blood Updated: 06/14/24 2205     Protime  10.1 Seconds      INR <0.93    aPTT [[id number]]  (Normal) Collected: 06/14/24 2138    Specimen: Blood Updated: 06/14/24 2205     PTT 27.5 seconds     [place] Draw [[id number]] Collected: 06/14/24 2138    Specimen: Blood Updated: 06/14/24 2145    Narrative:      The following orders were created for panel order [place] Draw.  Procedure                               Abnormality         Status                     ---------                               -----------         ------                     Green Top (Gel)[[id number]]                                  Final result               Lavender Top[[id number]]                                     Final result               Gold Top - SST[[id number]]                                   Final result               Light Blue Top[[id number]]                                   Final result                 Please view results for these tests on the individual orders.    Green Top (Gel) [[id number]] Collected: 06/14/24 2138    Specimen: Blood Updated: 06/14/24 2145     Extra Tube Hold for add-ons.     Comment: Auto resulted.       Lavender Top [[id number]] Collected: 06/14/24 2138    Specimen: Blood Updated: 06/14/24 2145     Extra Tube hold for add-on     Comment: Auto resulted       Gold Top - SST [[id number]] Collected: 06/14/24 2138    Specimen: Blood Updated: 06/14/24 2145     Extra Tube Hold for add-ons.     Comment: Auto resulted.       Light Blue Top [[id number]] Collected: 06/14/24 2138    Specimen: Blood Updated: 06/14/24 2145     Extra Tube Hold for add-ons.     Comment: Auto resulted       CBC & Differential [[id number]]  (Abnormal) Collected: 06/14/24 2138    Specimen: Blood Updated: 06/14/24 2144    Narrative:      The following orders were created for panel order CBC & Differential.  Procedure                               Abnormality         Status                     ---------                               -----------         ------                     CBC Auto  Differential[[id number]]        Abnormal            Final result                 Please view results for these tests on the individual orders.    CBC Auto Differential [[id number]]  (Abnormal) Collected: 06/14/24 2138    Specimen: Blood Updated: 06/14/24 2144     WBC 11.02 10*3/mm3      RBC 4.47 10*6/mm3      Hemoglobin 12.7 g/dL      Hematocrit 39.5 %      MCV 88.4 fL      MCH 28.4 pg      MCHC 32.2 g/dL      RDW 13.5 %      RDW-SD 43.9 fl      MPV 10.1 fL      Platelets 403 10*3/mm3      Neutrophil % 64.2 %      Lymphocyte % 25.1 %      Monocyte % 8.3 %      Eosinophil % 1.5 %      Basophil % 0.5 %      Immature Grans % 0.4 %      Neutrophils, Absolute 7.08 10*3/mm3      Lymphocytes, Absolute 2.77 10*3/mm3      Monocytes, Absolute 0.91 10*3/mm3      Eosinophils, Absolute 0.16 10*3/mm3      Basophils, Absolute 0.06 10*3/mm3      Immature Grans, Absolute 0.04 10*3/mm3      nRBC 0.0 /100 WBC     POC Glucose Once [[id number]]  (Abnormal) Collected: 06/14/24 2109    Specimen: Blood Updated: 06/14/24 2113     Glucose 336 mg/dL      Comment: Serial Number: [id number]:  [id number]                Imaging Results (Last 24 Hours)       Procedure Component Value Units Date/Time    MRI Brain Without Contrast [[id number]] Collected: 06/14/24 2346     Updated: 06/14/24 2351    Narrative:      MRI BRAIN WO CONTRAST    Date of Exam: 6/14/2024 11:20 PM EDT    Indication: Stroke, follow up  stroke protocol.     Comparison: CT head/perfusion 6/14/2024.    Technique:  Routine multiplanar/multisequence sequence images of the brain were obtained without contrast administration.      Findings:  There is no diffusion restriction to suggest acute infarct. There is no mass effect, midline shift or abnormal extra-axial collection. Gradient images are degraded by artifact, but no focal intracranial hemorrhage is seen. There are a few patchy areas of   FLAIR hyperintense signal within the cerebral white matter. The basal ganglia, brainstem  and cerebellum appear unremarkable. Orbits appear within normal limits. Superficial soft tissue and calvarial signal is unremarkable. Paranasal sinuses and mastoid   air cells appear well aerated. The major arterial flow voids appear intact.      Impression:      Impression:    1. No acute intracranial abnormality.  2. Minimal chronic small vessel ischemic change.        Electronically Signed: Scotty Rosario MD    6/14/2024 11:49 PM EDT    Workstation ID: [id number]    XR Chest 1 View [[id number]] Collected: 06/14/24 2229     Updated: 06/14/24 2233    Narrative:      XR CHEST 1 VW    Date of Exam: 6/14/2024 10:11 PM EDT    Indication: Acute Stroke Protocol (onset < 12 hrs)    Comparison: None available.    Findings: No focal consolidation. Mild vascular congestion. No pneumothorax or pleural effusion. Cardiac size is normal. The visualized clavicles appear intact. No displaced rib fractures. The visualized upper abdomen is normal.      Impression:      Impression: No acute cardiopulmonary disease.      Electronically Signed: Zenon Rodriguez MD    6/14/2024 10:31 PM EDT    Workstation ID: [id number]    CT Angiogram Head w AI Analysis of LVO [[id number]] Collected: 06/14/24 2157     Updated: 06/14/24 2205    Narrative:      CT ANGIOGRAM NECK, CT ANGIOGRAM HEAD W AI ANALYSIS OF LVO    Date of Exam: 6/14/2024 9:38 PM EDT    Indication: Stroke, follow up  Neuro deficit, acute, stroke suspected.    Comparison: CT head and perfusion dictated separately    Technique: CTA of the head and neck was performed before and after the uneventful intravenous administration of iodinated contrast. Reconstructed coronal and sagittal images were also obtained. In addition, a 3-D volume rendered image was created for   interpretation. Automated exposure control and iterative reconstruction methods were used.      Findings:    CTA NECK:  *Aortic arch: No aneurysm. No significant stenosis or occlusion of the major arch vessels. Aortic  atherosclerotic disease.  *Left carotid system: No aneurysm, significant stenosis or occlusion.  *Right carotid system: No aneurysm, significant stenosis or occlusion.  *Vertebrobasilar system: The vertebral arteries arise from their respective subclavian arteries. Negative for aneurysm or large vessel occlusion. Suspect focal mild to moderate stenosis of left V4 segment image 77 series 5 due to calcified plaque with   otherwise more distal opacification.    CTA HEAD:  *Anterior circulation: No aneurysm, significant stenosis or occlusion.  *Posterior circulation: No aneurysm, significant stenosis or occlusion.  *Anatomic variants: None of significance.  *Venous sinuses: Patent.    Degenerative elated changes throughout cervical spine. No high-grade central spinal canal stenosis. Lung apices grossly clear. Multilevel varying degrees of neuroforaminal stenosis which may be severe on the right C4-C5 and on the left C5-C6.      Impression:      1.No hemodynamically significant stenosis, large vessel occlusion or aneurysms in intracranial circulation  2.No hemodynamically significant stenosis, dissection or aneurysms in extracranial circulation.        Electronically Signed: Ben Jay MD    6/14/2024 10:03 PM EDT    Workstation ID: [id number]    CT Angiogram Neck [[id number]] Collected: 06/14/24 2157     Updated: 06/14/24 2205    Narrative:      CT ANGIOGRAM NECK, CT ANGIOGRAM HEAD W AI ANALYSIS OF LVO    Date of Exam: 6/14/2024 9:38 PM EDT    Indication: Stroke, follow up  Neuro deficit, acute, stroke suspected.    Comparison: CT head and perfusion dictated separately    Technique: CTA of the head and neck was performed before and after the uneventful intravenous administration of iodinated contrast. Reconstructed coronal and sagittal images were also obtained. In addition, a 3-D volume rendered image was created for   interpretation. Automated exposure control and iterative reconstruction methods were  used.      Findings:    CTA NECK:  *Aortic arch: No aneurysm. No significant stenosis or occlusion of the major arch vessels. Aortic atherosclerotic disease.  *Left carotid system: No aneurysm, significant stenosis or occlusion.  *Right carotid system: No aneurysm, significant stenosis or occlusion.  *Vertebrobasilar system: The vertebral arteries arise from their respective subclavian arteries. Negative for aneurysm or large vessel occlusion. Suspect focal mild to moderate stenosis of left V4 segment image 77 series 5 due to calcified plaque with   otherwise more distal opacification.    CTA HEAD:  *Anterior circulation: No aneurysm, significant stenosis or occlusion.  *Posterior circulation: No aneurysm, significant stenosis or occlusion.  *Anatomic variants: None of significance.  *Venous sinuses: Patent.    Degenerative elated changes throughout cervical spine. No high-grade central spinal canal stenosis. Lung apices grossly clear. Multilevel varying degrees of neuroforaminal stenosis which may be severe on the right C4-C5 and on the left C5-C6.      Impression:      1.No hemodynamically significant stenosis, large vessel occlusion or aneurysms in intracranial circulation  2.No hemodynamically significant stenosis, dissection or aneurysms in extracranial circulation.        Electronically Signed: Ben Jay MD    6/14/2024 10:03 PM EDT    Workstation ID: [id number]    CT CEREBRAL PERFUSION WITH & WITHOUT CONTRAST [[id number]] Collected: 06/14/24 2154     Updated: 06/14/24 2158    Narrative:      CT CEREBRAL PERFUSION W WO CONTRAST    Date of Exam: 6/14/2024 9:38 PM EDT    Indication: Neuro deficit, acute, stroke suspected  Neuro deficit, acute, stroke suspected.     Comparison: Head CT performed earlier today    Technique: Axial CT images of the brain were obtained prior to and after the administration of iodinated contrast. CT Perfusion protocol was utilized. Automated post processing was performed by RAPID  software and submitted to PACS for interpretation.   Automated exposure control and iterative reconstruction was utilized.      Findings:  FINDINGS:    CT Perfusion:  CBF (<30%) volume: 0 mL  Tmax (>6.0s) volume: 0 mL  Mismatch volume: 0 mL  Mismatch ratio: None    Small volume artifact versus hypoperfusion within the posterior circulation, Tmax greater than 4 seconds of 11 mm.      Impression:      Negative for completed infarct or ischemic penumbra.      Electronically Signed: Ben Jay MD    6/14/2024 9:55 PM EDT    Workstation ID: [id number]    CT Head Without Contrast Stroke Protocol [[id number]] Collected: 06/14/24 2123     Updated: 06/14/24 2129    Narrative:      CT HEAD WO CONTRAST STROKE PROTOCOL    Date of Exam: 6/14/2024 9:18 PM EDT    Indication: Neuro deficit, acute, stroke suspected  Neuro deficit, acute, stroke suspected.     Comparison: None available.    Technique: Axial CT images were obtained of the head without contrast administration.  Reconstructed coronal and sagittal images were also obtained. Automated exposure control and iterative construction methods were used.    Scan Time: 9:25 p.m. 6/14/2024  Results discussed with     at 9:25 p.m. 6/14/2024.      Findings:  Negative for large territory loss of gray-white differentiation, acute intracranial hemorrhage, large mass lesion, midline shift or hydrocephalus. There is prominence of the ventricles and extra-axial fluid spaces suggesting mild to moderate global   parenchymal volume loss. No large extra-axial fluid collection. No obstructive sinus disease or large mastoid effusion. Distal vertebral artery and carotid atherosclerotic disease noted. Negative for depressed skull fracture. Scalp soft tissues   unremarkable.      Impression:      Impression:  Negative for acute intracranial hemorrhage, large territory infarct, midline shift or hydrocephalus.        Electronically Signed: Ben Jay MD    6/14/2024 9:27 PM EDT     Workstation ID: [id number]              Assessment & Plan        This is a 63 y.o. male with:    Active and Resolved Problems  Active Hospital Problems    Diagnosis  POA    **TIA (transient ischemic attack) [G45.9]  Yes      Resolved Hospital Problems   No resolved problems to display.       Possible TIA  Stroke rule out  Worsening diabetic neuropathy  ABCD2 score 6 points  CT angio head and neck in the ER did not show any large vessel occlusion  MRI brain negative for acute pathology  Status post loading dose with Plavix 300 mg and aspirin 300 mg in the ER  Continue dual antiplatelets 21 days  High intensity statin  With factor modification  Telemetry monitoring  Echo  Follow TSH, B12, lipid panel  PT OT eval    Hypertension  Start Norvasc 5 mg once daily  Holding losartan and HCTZ given his ENOC  Coreg 12.5 mg twice daily      History of major depressive disorder  Peripheral neuropathy  Continue Cymbalta and gabapentin home dose  Continue risperidone, trazodone, pending reconciliation    Chronic pain syndrome  Norco as needed for moderate pain    ENOC, likely prerenal superimposed on underlying diabetic nephropathy  Start LR 75 mill per hour, reassess rate in the morning  Renal US    Type 2 diabetes  Insulin sliding scale    VTE Prophylaxis:  Pharmacologic & mechanical VTE prophylaxis orders are present.        The patient desires to be as follows:    CODE STATUS:       Full Code    Sharri Hawk, who can be contacted at , is the designated person to make medical decisions on the patient's behalf if He is incapable of doing so. This was clarified with patient and/or next of kin on 6/14/2024 during the course of this H&P.    Admission Status:  I believe this patient meets inpatient status.    Expected Length of Stay: 1-2    PDMP and Medication Dispenses via Sidebar reviewed and consistent with patient reported medications.    I discussed the patient's findings and my recommendations with patient, family, and  nursing staff.      Signature:     This document has been electronically signed by Carlos Llanes Alvarez, MD on Sobeida 15, 2024 00:43 EDT   [facility]     Electronically signed by Llanes Alvarez, Carlos, MD at 06/15/24 0544       Operative/Procedure Notes (all)    No notes of this type exist for this encounter.          Physician Progress Notes (last 48 hours)        Brammell, Timothy Duane, MD at 24 0949              [facility] MEDICINE SERVICE  DAILY PROGRESS NOTE    NAME: Matthieu Hawk  : 1960  MRN: [id number]      LOS: 1 day     PROVIDER OF SERVICE: Timothy Duane Brammell, MD    Chief Complaint: TIA (transient ischemic attack)    Subjective:     Interval History:  History taken from: patient  Patient Complaints: Patient primarily with the left hip and left low back pain that he states is worse from fall several weeks prior.  He denies any severe shortness of breath.  He is wearing his positive pressure sleep apparatus at the evening.  Denies any sputum production.  Eating without issue.  No bowel complaints.  Urinating without issue.  Denies any other additional acute issues.  Questions as to his MRI findings.  Had attempted pain management injections prior without success.      Review of Systems:   Review of Systems   All other systems reviewed and are negative.      Objective:     Vital Signs  Temp:  [97.7 °F (36.5 °C)-98.5 °F (36.9 °C)] 97.9 °F (36.6 °C)  Heart Rate:  [71-86] 86  Resp:  [11-24] 21  BP: (127-191)/() 191/103  Flow (L/min):  [2] 2   Body mass index is 53.09 kg/m².    Physical Exam  Physical Exam  Constitutional:       Appearance: He is obese.   HENT:      Head: Normocephalic.   Cardiovascular:      Rate and Rhythm: Normal rate.   Pulmonary:      Effort: Pulmonary effort is normal.      Breath sounds: Normal breath sounds.   Abdominal:      General: Bowel sounds are normal.      Palpations: Abdomen is soft.      Tenderness: There is no abdominal  tenderness.   Musculoskeletal:         General: No swelling.      Comments: Trace edema bilaterally   Neurological:      Mental Status: He is alert. Mental status is at baseline.   Psychiatric:         Behavior: Behavior normal.         Scheduled Meds   amLODIPine, 5 mg, Oral, Q24H  aspirin, 81 mg, Oral, Daily   Or  aspirin, 300 mg, Rectal, Daily  atorvastatin, 80 mg, Oral, Nightly  carvedilol, 12.5 mg, Oral, BID With Meals  clopidogrel, 75 mg, Oral, Daily  DULoxetine, 60 mg, Oral, Q12H  finasteride, 5 mg, Oral, Daily  folic acid, 1 mg, Oral, Daily  gabapentin, 300 mg, Oral, TID  heparin (porcine), 5,000 Units, Subcutaneous, Q8H  insulin glargine, 10 Units, Subcutaneous, Q12H  insulin lispro, 2-9 Units, Subcutaneous, 4x Daily AC & at Bedtime  lisinopril, 10 mg, Oral, Q12H  sodium chloride, 10 mL, Intravenous, Q12H  sodium chloride, 10 mL, Intravenous, Q12H       PRN Meds     acetaminophen **OR** acetaminophen    aluminum-magnesium hydroxide-simethicone    senna-docusate sodium **AND** polyethylene glycol **AND** bisacodyl **AND** bisacodyl    Calcium Replacement - Follow Nurse / BPA Driven Protocol    dextrose    dextrose    glucagon (human recombinant)    HYDROcodone-acetaminophen    Magnesium Standard Dose Replacement - Follow Nurse / BPA Driven Protocol    ondansetron    Phosphorus Replacement - Follow Nurse / BPA Driven Protocol    Potassium Replacement - Follow Nurse / BPA Driven Protocol    sodium chloride    sodium chloride    sodium chloride    sodium chloride    sodium chloride   Infusions         Diagnostic Data    Results from last 7 days   Lab Units 06/17/24  0431   WBC 10*3/mm3 9.70   HEMOGLOBIN g/dL 12.3*   HEMATOCRIT % 38.6   PLATELETS 10*3/mm3 340   GLUCOSE mg/dL 239*   CREATININE mg/dL 1.09   BUN mg/dL 20   SODIUM mmol/L 133*   POTASSIUM mmol/L 4.5   AST (SGOT) U/L 17   ALT (SGPT) U/L 7   ALK PHOS U/L 85   BILIRUBIN mg/dL 0.3   ANION GAP mmol/L 10.8       MRI Lumbar Spine With & Without  Contrast    Result Date: 6/15/2024  Impression: Contrast-enhanced MRI of the thoracic spine demonstrates no evidence of acute osseous findings or significant spinal canal or neuroforaminal impingement. There is no definite intramedullary cord signal abnormality or abnormal enhancement. The lumbar spine demonstrates some mild spondylosis changes and superimposed epidural lipomatosis which results in some areas of moderate spinal canal and neuroforaminal narrowing. There is no abnormal enhancement. Electronically Signed: Omar Merritt MD  6/15/2024 4:20 PM EDT  Workstation ID: [id number]    MRI Thoracic Spine With & Without Contrast    Result Date: 6/15/2024  Impression: Contrast-enhanced MRI of the thoracic spine demonstrates no evidence of acute osseous findings or significant spinal canal or neuroforaminal impingement. There is no definite intramedullary cord signal abnormality or abnormal enhancement. The lumbar spine demonstrates some mild spondylosis changes and superimposed epidural lipomatosis which results in some areas of moderate spinal canal and neuroforaminal narrowing. There is no abnormal enhancement. Electronically Signed: Omar Merritt MD  6/15/2024 4:20 PM EDT  Workstation ID: [id number]       I reviewed the patient's new clinical results.    Assessment:    Lower extremity neuropathy  Low back pain  Obstructive sleep apnea  Morbid obesity  Type 2 diabetes/insulin requiring  Acute renal insufficiency  Polypharmacy  Pain control   Hypertension  History of falling     Home medicines reviewed.  Old records reviewed.  Will obtain CT scanning of the pelvis.  Multiple meds resumed.  Home diabetic medicines resumed.  Follow-up labs.  Patient would benefit from short-term rehab less than 30 days of skilled services needed prior to returning home with his wife.  Active and Resolved Problems  Active Hospital Problems    Diagnosis  POA    **TIA (transient ischemic attack) [G45.9]  Yes      Resolved Hospital  Problems   No resolved problems to display.           VTE Prophylaxis:  Pharmacologic & mechanical VTE prophylaxis orders are present.         Code status is   Code Status and Medical Interventions:   Ordered at: 06/15/24 0611     Level Of Support Discussed With:    Patient     Code Status (Patient has no pulse and is not breathing):    CPR (Attempt to Resuscitate)     Medical Interventions (Patient has pulse or is breathing):    Full Support       Plan for disposition:rehab in 2 days    Time: 30 minutes    Signature: Electronically signed by Timothy Duane Brammell, MD, 24, 09:49 EDT.  [facility] Hospitalist Team     Electronically signed by Brammell, Timothy Duane, MD at 24 1008       Brammell, Timothy Duane, MD at 24 1041              [facility] MEDICINE SERVICE  DAILY PROGRESS NOTE    NAME: Matthieu Hawk  : 1960  MRN: [id number]      LOS: 0 days     PROVIDER OF SERVICE: Timothy Duane Brammell, MD    Chief Complaint: TIA (transient ischemic attack)    Subjective:     Interval History:  History taken from: patient  Patient Complaints: Patient with chronic lower extremity pain.  Chronic back pain.  Has sleep apnea at home and poorly compliant with his CPAP.  Denies any dizziness or any headache.  Denies any other additional acute issues.  Patient Denies:      Review of Systems:   Review of Systems   All other systems reviewed and are negative.      Objective:     Vital Signs  Temp:  [97.6 °F (36.4 °C)-99 °F (37.2 °C)] 97.6 °F (36.4 °C)  Heart Rate:  [] 78  Resp:  [11-15] 11  BP: (124-152)/(66-86) 152/86  Flow (L/min):  [2-5] 2   Body mass index is 53.09 kg/m².    Physical Exam  Physical Exam  Vitals reviewed.   Constitutional:       Appearance: He is obese.   HENT:      Head: Normocephalic.   Cardiovascular:      Rate and Rhythm: Normal rate and regular rhythm.   Pulmonary:      Effort: Pulmonary effort is normal.      Breath sounds: Normal breath sounds.   Abdominal:       Palpations: Abdomen is soft.      Tenderness: There is no abdominal tenderness.   Musculoskeletal:         General: No swelling.   Neurological:      Mental Status: He is alert.      Comments: Full exam deferred to neurology.         Scheduled Meds   amLODIPine, 5 mg, Oral, Q24H  aspirin, 81 mg, Oral, Daily   Or  aspirin, 300 mg, Rectal, Daily  atorvastatin, 80 mg, Oral, Nightly  carvedilol, 12.5 mg, Oral, BID With Meals  clopidogrel, 75 mg, Oral, Daily  DULoxetine, 60 mg, Oral, Q12H  finasteride, 5 mg, Oral, Daily  folic acid, 1 mg, Oral, Daily  gabapentin, 300 mg, Oral, TID  heparin (porcine), 5,000 Units, Subcutaneous, Q8H  insulin glargine, 10 Units, Subcutaneous, Q12H  insulin lispro, 2-9 Units, Subcutaneous, 4x Daily AC & at Bedtime  sodium chloride, 10 mL, Intravenous, Q12H  sodium chloride, 10 mL, Intravenous, Q12H       PRN Meds     acetaminophen **OR** acetaminophen    aluminum-magnesium hydroxide-simethicone    senna-docusate sodium **AND** polyethylene glycol **AND** bisacodyl **AND** bisacodyl    Calcium Replacement - Follow Nurse / BPA Driven Protocol    dextrose    dextrose    glucagon (human recombinant)    HYDROcodone-acetaminophen    Magnesium Standard Dose Replacement - Follow Nurse / BPA Driven Protocol    ondansetron    Phosphorus Replacement - Follow Nurse / BPA Driven Protocol    Potassium Replacement - Follow Nurse / BPA Driven Protocol    sodium chloride    sodium chloride    sodium chloride    sodium chloride    sodium chloride   Infusions  lactated ringers, 100 mL/hr, Last Rate: 100 mL/hr (06/16/24 0110)          Diagnostic Data    Results from last 7 days   Lab Units 06/16/24  0124 06/15/24  0340   WBC 10*3/mm3 8.74 10.80   HEMOGLOBIN g/dL 12.1* 12.7*   HEMATOCRIT % 38.8 40.4   PLATELETS 10*3/mm3 411 416   GLUCOSE mg/dL 270* 322*   CREATININE mg/dL 1.14 1.76*   BUN mg/dL 25* 32*   SODIUM mmol/L 134* 134*   POTASSIUM mmol/L 4.5 4.9   AST (SGOT) U/L  --  17   ALT (SGPT) U/L  --  8   ALK  PHOS U/L  --  91   BILIRUBIN mg/dL  --  0.2   ANION GAP mmol/L 8.2 11.7       MRI Lumbar Spine With & Without Contrast    Result Date: 6/15/2024  Impression: Contrast-enhanced MRI of the thoracic spine demonstrates no evidence of acute osseous findings or significant spinal canal or neuroforaminal impingement. There is no definite intramedullary cord signal abnormality or abnormal enhancement. The lumbar spine demonstrates some mild spondylosis changes and superimposed epidural lipomatosis which results in some areas of moderate spinal canal and neuroforaminal narrowing. There is no abnormal enhancement. Electronically Signed: Omar Merritt MD  6/15/2024 4:20 PM EDT  Workstation ID: [id number]    MRI Thoracic Spine With & Without Contrast    Result Date: 6/15/2024  Impression: Contrast-enhanced MRI of the thoracic spine demonstrates no evidence of acute osseous findings or significant spinal canal or neuroforaminal impingement. There is no definite intramedullary cord signal abnormality or abnormal enhancement. The lumbar spine demonstrates some mild spondylosis changes and superimposed epidural lipomatosis which results in some areas of moderate spinal canal and neuroforaminal narrowing. There is no abnormal enhancement. Electronically Signed: Omar Merritt MD  6/15/2024 4:20 PM EDT  Workstation ID: [id number]    US Renal Bilateral    Result Date: 6/15/2024  Impression: Normal ultrasound appearance of the kidneys with no obstructive uropathy Electronically Signed: José Miguel Muse  6/15/2024 7:29 AM EDT  Workstation ID: [id number]    MRI Brain Without Contrast    Result Date: 6/14/2024  Impression: 1. No acute intracranial abnormality. 2. Minimal chronic small vessel ischemic change. Electronically Signed: Scotty Rosario MD  6/14/2024 11:49 PM EDT  Workstation ID: [id number]    XR Chest 1 View    Result Date: 6/14/2024  Impression: No acute cardiopulmonary disease. Electronically Signed: Zenon Rodriguez MD  6/14/2024 10:31 PM  EDT  Workstation ID: [id number]    CT Angiogram Head w AI Analysis of LVO    Result Date: 6/14/2024  1.No hemodynamically significant stenosis, large vessel occlusion or aneurysms in intracranial circulation 2.No hemodynamically significant stenosis, dissection or aneurysms in extracranial circulation. Electronically Signed: Ben Jay MD  6/14/2024 10:03 PM EDT  Workstation ID: [id number]    CT Angiogram Neck    Result Date: 6/14/2024  1.No hemodynamically significant stenosis, large vessel occlusion or aneurysms in intracranial circulation 2.No hemodynamically significant stenosis, dissection or aneurysms in extracranial circulation. Electronically Signed: Ben Jay MD  6/14/2024 10:03 PM EDT  Workstation ID: [id number]    CT CEREBRAL PERFUSION WITH & WITHOUT CONTRAST    Result Date: 6/14/2024  Negative for completed infarct or ischemic penumbra. Electronically Signed: Ben Jay MD  6/14/2024 9:55 PM EDT  Workstation ID: [id number]    CT Head Without Contrast Stroke Protocol    Result Date: 6/14/2024  Impression: Negative for acute intracranial hemorrhage, large territory infarct, midline shift or hydrocephalus. Electronically Signed: Ben Jay MD  6/14/2024 9:27 PM EDT  Workstation ID: [id number]           Assessment:    Lower extremity neuropathy  Obesity  Chronic back pain  Sleep apnea  Type 2 diabetes  Acute renal injury  Polypharmacy     Plan.  Attempting physical and Occupational Therapy.  Patient has walker as well as wheelchair at home.  Unclear as if patient will be able to be discharged home or to short-term rehab setting.  Appreciate neurology input. Patient will benefit from short term rehab, less than 30 days prior to returning home.  Active and Resolved Problems  Active Hospital Problems    Diagnosis  POA    **TIA (transient ischemic attack) [G45.9]  Yes      Resolved Hospital Problems   No resolved problems to display.           VTE Prophylaxis:  Pharmacologic & mechanical VTE  prophylaxis orders are present.         Code status is   Code Status and Medical Interventions:   Ordered at: 06/15/24 0611     Level Of Support Discussed With:    Patient     Code Status (Patient has no pulse and is not breathing):    CPR (Attempt to Resuscitate)     Medical Interventions (Patient has pulse or is breathing):    Full Support       Plan for disposition:rehab in 2 days    Time: 30 minutes    Signature: Electronically signed by Timothy Duane Brammell, MD, 24, 10:41 EDT.  [facility] Hospitalist Team     Electronically signed by Brammell, Timothy Duane, MD at 24 0949       Siva Brennan MD at 06/15/24 1452              [facility] MEDICINE SERVICE  DAILY PROGRESS NOTE    NAME: Matthieu Hawk  : 1960  MRN: [id number]      LOS: 0 days     PROVIDER OF SERVICE: Siva Brennan MD    Chief Complaint: TIA (transient ischemic attack)    Subjective:     Interval History:  History taken from: patient  Patient Complaints: Patient denies any complaints today and he feels essentially back to normal other than ongoing bilateral lower extremity weakness.  Initial stroke workup has been negative.  Will go ahead and get MRI of the thoracic and lumbar spine.      Review of Systems:   Review of system done and negative    Objective:     Vital Signs  Temp:  [97.6 °F (36.4 °C)-98.8 °F (37.1 °C)] 98.8 °F (37.1 °C)  Heart Rate:  [] 91  Resp:  [13-20] 13  BP: ()/(57-95) 124/80  Flow (L/min):  [2-3] 3   Body mass index is 53.09 kg/m².    Physical Exam  General: NAD  HEENT: PERRL  CV: Normal S1, S2  Respiratory: CTAB  Abdomen: Soft, nontender, nondistended  Neuro: Alert oriented x 3, motor strength is grossly intact in the upper extremities and noted to have more strength of 4/5 in the bilateral lower extremities.  Also noted to have decreased sensation in left lower extremity. Cranial nerves intact.    Scheduled Meds   amLODIPine, 5 mg, Oral, Q24H  aspirin, 81 mg, Oral, Daily    Or  aspirin, 300 mg, Rectal, Daily  atorvastatin, 80 mg, Oral, Nightly  carvedilol, 12.5 mg, Oral, BID With Meals  clopidogrel, 75 mg, Oral, Daily  DULoxetine, 60 mg, Oral, Q12H  finasteride, 5 mg, Oral, Daily  gabapentin, 300 mg, Oral, TID  gadoteridol, 20 mL, Intravenous, Once in imaging  heparin (porcine), 5,000 Units, Subcutaneous, Q8H  insulin glargine, 10 Units, Subcutaneous, Q12H  insulin lispro, 2-9 Units, Subcutaneous, 4x Daily AC & at Bedtime  sodium chloride, 10 mL, Intravenous, Q12H  sodium chloride, 10 mL, Intravenous, Q12H       PRN Meds     acetaminophen **OR** acetaminophen    aluminum-magnesium hydroxide-simethicone    senna-docusate sodium **AND** polyethylene glycol **AND** bisacodyl **AND** bisacodyl    Calcium Replacement - Follow Nurse / BPA Driven Protocol    dextrose    dextrose    glucagon (human recombinant)    HYDROcodone-acetaminophen    Magnesium Standard Dose Replacement - Follow Nurse / BPA Driven Protocol    ondansetron    Phosphorus Replacement - Follow Nurse / BPA Driven Protocol    Potassium Replacement - Follow Nurse / BPA Driven Protocol    sodium chloride    sodium chloride    sodium chloride    sodium chloride    sodium chloride   Infusions  lactated ringers, 75 mL/hr, Last Rate: 75 mL/hr (06/15/24 0557)          Diagnostic Data    Results from last 7 days   Lab Units 06/15/24  0340   WBC 10*3/mm3 10.80   HEMOGLOBIN g/dL 12.7*   HEMATOCRIT % 40.4   PLATELETS 10*3/mm3 416   GLUCOSE mg/dL 322*   CREATININE mg/dL 1.76*   BUN mg/dL 32*   SODIUM mmol/L 134*   POTASSIUM mmol/L 4.9   AST (SGOT) U/L 17   ALT (SGPT) U/L 8   ALK PHOS U/L 91   BILIRUBIN mg/dL 0.2   ANION GAP mmol/L 11.7       US Renal Bilateral    Result Date: 6/15/2024  Impression: Normal ultrasound appearance of the kidneys with no obstructive uropathy Electronically Signed: José Miguel Muse  6/15/2024 7:29 AM EDT  Workstation ID: [id number]    MRI Brain Without Contrast    Result Date: 6/14/2024  Impression: 1. No acute  intracranial abnormality. 2. Minimal chronic small vessel ischemic change. Electronically Signed: Scotty Rosario MD  6/14/2024 11:49 PM EDT  Workstation ID: [id number]    XR Chest 1 View    Result Date: 6/14/2024  Impression: No acute cardiopulmonary disease. Electronically Signed: Zenon Rodriguez MD  6/14/2024 10:31 PM EDT  Workstation ID: [id number]    CT Angiogram Head w AI Analysis of LVO    Result Date: 6/14/2024  1.No hemodynamically significant stenosis, large vessel occlusion or aneurysms in intracranial circulation 2.No hemodynamically significant stenosis, dissection or aneurysms in extracranial circulation. Electronically Signed: Ben Jay MD  6/14/2024 10:03 PM EDT  Workstation ID: [id number]    CT Angiogram Neck    Result Date: 6/14/2024  1.No hemodynamically significant stenosis, large vessel occlusion or aneurysms in intracranial circulation 2.No hemodynamically significant stenosis, dissection or aneurysms in extracranial circulation. Electronically Signed: Ben Jay MD  6/14/2024 10:03 PM EDT  Workstation ID: [id number]    CT CEREBRAL PERFUSION WITH & WITHOUT CONTRAST    Result Date: 6/14/2024  Negative for completed infarct or ischemic penumbra. Electronically Signed: Ben Jay MD  6/14/2024 9:55 PM EDT  Workstation ID: [id number]    CT Head Without Contrast Stroke Protocol    Result Date: 6/14/2024  Impression: Negative for acute intracranial hemorrhage, large territory infarct, midline shift or hydrocephalus. Electronically Signed: Ben Jay MD  6/14/2024 9:27 PM EDT  Workstation ID: [id number]       I reviewed the patient's new clinical results.    Assessment/Plan:     Active and Resolved Problems  Active Hospital Problems    Diagnosis  POA    **TIA (transient ischemic attack) [G45.9]  Yes      Resolved Hospital Problems   No resolved problems to display.     Possible TIA  Stroke rule out  Worsening diabetic neuropathy  Presented with B/L LE weakness.   CT angio head and neck in the ER  did not show any large vessel occlusion  MRI brain negative for acute pathology  Status post loading dose with Plavix 300 mg and aspirin 300 mg in the ER  Continue dual antiplatelets 21 days  High intensity statin  With factor modification  Telemetry monitoring  Echo  Follow TSH, B12, lipid panel  PT OT eval  MRI of the T and L spine pending.     Hypertension  Start Norvasc 5 mg once daily  Holding losartan and HCTZ given his ENOC  Coreg 12.5 mg twice daily        History of major depressive disorder  Peripheral neuropathy  Continue Cymbalta and gabapentin home dose  Continue risperidone, trazodone, pending reconciliation     Chronic pain syndrome  Norco as needed for moderate pain     ENOC, likely prerenal superimposed on underlying diabetic nephropathy  Start LR 75 mill per hour, reassess rate in the morning  Renal US     Type 2 diabetes  Insulin sliding scale       VTE Prophylaxis:  Pharmacologic & mechanical VTE prophylaxis orders are present.         Code status is   Code Status and Medical Interventions:   Ordered at: 06/15/24 0611     Level Of Support Discussed With:    Patient     Code Status (Patient has no pulse and is not breathing):    CPR (Attempt to Resuscitate)     Medical Interventions (Patient has pulse or is breathing):    Full Support       Plan for disposition:Needs rehab.     Time: 30 minutes    Signature: Electronically signed by Siva Brennan MD, 06/15/24, 14:53 EDT.  [facility], [place], operated by [facility] Hospitalist Team     Electronically signed by Siva Brennan MD at 06/15/24 1459          Consult Notes (last 48 hours)        Zenon Isabel at 06/17/24 0950        Consult Orders    1. Inpatient Spiritual Care Consult [[id number]] ordered by Llanes Alvarez, Carlos, MD at 06/15/24 8408              Summary:Patient request for  support                 Patient reports being anxious/discouraged due to his limitations with his mobility. Patient reports not wanting others to have to help him and wants to maintain  independence as long as possible. Patient is Advent and that his spiritual beliefs give him encouragement. Prayed with patient over topics discussed.     Will continue to follow.    Chaplain Zenon Isabel    Electronically signed by Zenon Isabel at 06/17/24 1308          Physical Therapy Notes (all)        Lisa Fuller, PT at 06/15/24 0897  Version 2 of 2         Goal Outcome Evaluation:     Pt is a 64 y/o male who presents to [facility] with sensation deficits in the LLE and weakness. Pt here for possible TIA and worsening diabetic neuropathy. MRI brain (-) and CT angio head and neck (-) for large vessel occlusion. PMH significant for morbid obesity, HTN, diabetic peripheral neuropathy, and chronic low back pain. At [facility] pt uses a cane and wheelchair for mobility. He reports multiple falls. Pt does not utilize home O2, but at this time is requiring 5L. He presents with decreased LLE strength and sensation deficits compared to the R as well as LUE strength deficits. Pt becomes dizzy and reports having a headache when coming to the EOB and has difficulty maintaining eyes open. BP WNL: supine 145/79, seated /88, and supine 150/80. Pt reports improved symptoms once returned to supine. He requires mod A for bed mobility and noted 3-/5 RLE, 2+/5 LLE strength grossly. Pt was given stroke prevention handout and given education regarding stroke signs/symptoms. Pt will need acute inpatient rehab at d/c.    Electronically signed by Lisa Fuller PT at 06/15/24 1013       Lisa Fuller PT at 06/15/24 0803  Version 1 of 2         Goal Outcome Evaluation:     Pt is a 64 y/o male who presents to [facility] with sensation deficits in the LLE and weakness. Pt here for possible TIA and worsening diabetic neuropathy. MRI brain (-) and CT angio head and neck (-) for large vessel occlusion. PMH significant for morbid obesity, HTN, diabetic peripheral neuropathy, and chronic low back pain. At [facility] pt uses a cane and wheelchair for  mobility. He reports multiple falls. Pt does not utilize home O2, but at this time is requiring 5L. He presents with decreased LLE strength and sensation deficits compared to the R as well as LUE strength deficits. Pt becomes dizzy and reports having a headache when coming to the EOB and has difficulty maintaining eyes open. BP WNL: supine 145/79, seated /88, and supine 150/80. Pt reports improved symptoms once returned to supine. He requires mod A for bed mobility and noted 3-/5 RLE, 2+/5 LLE strength grossly. Pt will need acute inpatient rehab at d/c.    Electronically signed by Lisa Fuller, PT at 06/15/24 1001       Lisa Fuller, PT at 06/15/24 0825  Version 1 of 1         Patient Name: Matthieu Hawk  : 1960    MRN: [id number]                              Today's Date: 6/15/2024       Admit Date: 2024    Visit Dx:     ICD-10-CM ICD-9-CM   1. Weakness  R53.1 780.79   2. Renal insufficiency  N28.9 593.9   3. Uncontrolled type 2 diabetes mellitus with hyperglycemia  E11.65 250.02     Patient Active Problem List   Diagnosis    TIA (transient ischemic attack)     History reviewed. No pertinent past medical history.  History reviewed. No pertinent surgical history.   General Information       Row Name 06/15/24 1001          Physical Therapy Time and Intention    Document Type evaluation  -     Mode of Treatment physical therapy  -       Row Name 06/15/24 1001          General Information    Prior Level of Function independent:;all household mobility;min assist:;ADL's  pt reports spouse assists with ADLs. Uses a cane for household ambulation and wheelchair for community. Does not use home O2  -     Existing Precautions/Restrictions fall;oxygen therapy device and L/min  4.5L [facility]     Barriers to Rehab medically complex;previous functional deficit  -       Row Name 06/15/24 1001          Living Environment    People in Home spouse  -       Row Name 06/15/24 1001           Home Main Entrance    Number of Stairs, Main Entrance none  -       Row Name 06/15/24 1001          Stairs Within Home, Primary    Number of Stairs, Within Home, Primary none  -[facility] Name 06/15/24 1001          Cognition    Orientation Status (Cognition) oriented x 3  -[facility] Name 06/15/24 1001          Safety Issues, Functional Mobility    Impairments Affecting Function (Mobility) balance;endurance/activity tolerance;strength;sensation/sensory awareness;coordination;shortness of breath  -               User Key  (r) = Recorded By, (t) = Taken By, (c) = Cosigned By      Initials Name Provider Type    Lisa De Los Santos, PT Physical Therapist                   Mobility       Row Name 06/15/24 1003          Bed Mobility    Bed Mobility supine-sit-supine;scooting/bridging  -MARCOS     Scooting/Bridging [place] (Bed Mobility) maximum assist (25% patient effort);2 person assist  -     Supine-Sit-Supine [place] (Bed Mobility) moderate assist (50% patient effort)  -       Row Name 06/15/24 1003          Transfers    Comment, (Transfers) Unable to assess transfers at this time due to patient fatigue. Pt reports increasing headache and lightheadedness at EOB. /88, , SpO2 91% on 4.5L  -               User Key  (r) = Recorded By, (t) = Taken By, (c) = Cosigned By      Initials Name Provider Type    Lisa De Los Santos, PT Physical Therapist                   Obj/Interventions       Row Name 06/15/24 1006          Range of Motion Comprehensive    General Range of Motion bilateral lower extremity ROM WFL  -[facility] Name 06/15/24 1006          Strength Comprehensive (MMT)    General Manual Muscle Testing (MMT) Assessment lower extremity strength deficits identified  -     Comment, General Manual Muscle Testing (MMT) Assessment LLE grossly 2+/5, RLE grossly 3-/5  -       Row Name 06/15/24 1006          Balance    Balance Assessment sitting static balance;sitting dynamic balance  -      Static Sitting Balance contact guard  -     Dynamic Sitting Balance contact guard;minimal assist  -     Position, Sitting Balance sitting edge of bed  -[facility] Name 06/15/24 1006          Sensory Assessment (Somatosensory)    Sensory Assessment (Somatosensory) other (see comments)  pt reports diabetic peripheral neuropathy and decreased LLE sensation to light touch  -               User Key  (r) = Recorded By, (t) = Taken By, (c) = Cosigned By      Initials Name Provider Type    Lisa De Los Santos, PT Physical Therapist                   Goals/Plan       [facility] Name 06/15/24 1008          Bed Mobility Goal 1 (PT)    Activity/Assistive Device (Bed Mobility Goal 1, PT) bed mobility activities, all  -     Hood Level/Cues Needed (Bed Mobility Goal 1, PT) supervision required  -     Time Frame (Bed Mobility Goal 1, PT) long term goal (LTG);2 weeks  -[facility] Name 06/15/24 1008          Transfer Goal 1 (PT)    Activity/Assistive Device (Transfer Goal 1, PT) sit-to-stand/stand-to-sit;bed-to-chair/chair-to-bed  -     Hood Level/Cues Needed (Transfer Goal 1, PT) minimum assist (75% or more patient effort)  -     Time Frame (Transfer Goal 1, PT) long term goal (LTG);2 weeks  -[facility] Name 06/15/24 1008          Problem Specific Goal 1 (PT)    Problem Specific Goal 1 (PT) Pt will demonstrate static standing at EOB with CGA and use of AD [id number] to improve functional transfers.  -     Time Frame (Problem Specific Goal 1, PT) long-term goal (LTG);2 weeks  -MARCOS       Row Name 06/15/24 1008          Therapy Assessment/Plan (PT)    Planned Therapy Interventions (PT) balance training;bed mobility training;gait training;home exercise program;patient/family education;strengthening;neuromuscular re-education;transfer training  -               User Key  (r) = Recorded By, (t) = Taken By, (c) = Cosigned By      Initials Name Provider Type    Lisa De Los Santos, SANDY Physical Therapist                    Clinical Impression       Row Name 06/15/24 1007          Pain    Pretreatment Pain Rating 0/10 - no pain  -MARCOS     Posttreatment Pain Rating 0/10 - no pain  -MARCOS       Row Name 06/15/24 1007          Plan of Care Review    Plan of Care Reviewed With patient  -     Outcome Evaluation Pt is a 64 y/o male who presents to [facility] with sensation deficits in the LLE and weakness. Pt here for possible TIA and worsening diabetic neuropathy. MRI brain (-) and CT angio head and neck (-) for large vessel occlusion. PMH significant for morbid obesity, HTN, diabetic peripheral neuropathy, and chronic low back pain. At [facility] pt uses a cane and wheelchair for mobility. He reports multiple falls. Pt does not utilize home O2, but at this time is requiring 5L. He presents with decreased LLE strength and sensation deficits compared to the R as well as LUE strength deficits. Pt becomes dizzy and reports having a headache when coming to the EOB and has difficulty maintaining eyes open. BP WNL: supine 145/79, seated /88, and supine 150/80. Pt reports improved symptoms once returned to supine. He requires mod A for bed mobility and noted 3-/5 RLE, 2+/5 LLE strength grossly. Pt was given stroke prevention handout and given education regarding stroke signs/symptoms. Pt will need acute inpatient rehab at d/c.  -       Row Name 06/15/24 1007          Therapy Assessment/Plan (PT)    Rehab Potential (PT) good, to achieve stated therapy goals  -     Criteria for Skilled Interventions Met (PT) yes;meets criteria  -     Therapy Frequency (PT) 5 times/wk  -     Predicted Duration of Therapy Intervention (PT) until d/c  -       Row Name 06/15/24 1007          Vital Signs    Pre Systolic BP Rehab 145  -MARCOS     Pre Treatment Diastolic BP 79  -MARCOS     Intra Systolic BP Rehab 150  -MARCOS     Intra Treatment Diastolic BP 88  -MARCOS     Post Systolic BP Rehab 150  -MARCOS     Post Treatment Diastolic BP 80  -MARCOS     Pre SpO2 (%) 95  -MARCOS     O2  Delivery Pre Treatment supplemental O2  -MARCOS     Intra SpO2 (%) 91  -MARCOS     O2 Delivery Intra Treatment supplemental O2  -MARCOS     Post SpO2 (%) 92  -MARCOS     O2 Delivery Post Treatment supplemental O2  4.5L  -MARCOS       Row Name 06/15/24 1007          Positioning and Restraints    Pre-Treatment Position in bed  -MARCOS     Post Treatment Position bed  -MARCOS     In Bed notified nsg;fowlers;call light within reach;encouraged to call for assist;exit alarm on  -MARCOS               User Key  (r) = Recorded By, (t) = Taken By, (c) = Cosigned By      Initials Name Provider Type    Lisa De Los Santos, PT Physical Therapist                   Outcome Measures       Row Name 06/15/24 1009 06/15/24 0301       How much help from another person do you currently need...    Turning from your back to your side while in flat bed without using bedrails? 3  -MARCOS 4  -KB    Moving from lying on back to sitting on the side of a flat bed without bedrails? 3  -MARCOS 4  -KB    Moving to and from a bed to a chair (including a wheelchair)? 2  -MARCOS 3  -KB    Standing up from a chair using your arms (e.g., wheelchair, bedside chair)? 2  -MARCOS 3  -KB    Climbing 3-5 steps with a railing? 1  -MARCOS 2  -KB    To walk in hospital room? 2  -MARCOS 3  -KB    AM-PAC 6 Clicks Score (PT) 13  -MARCOS 19  -KB    Highest Level of Mobility Goal 4 --> Transfer to chair/commode  - 6 --> Walk 10 steps or more  -KB      Row Name 06/15/24 1009          Modified Curry Scale    Pre-Stroke Modified Alicia Scale 6 - Unable to determine (UTD) from the medical record documentation  -MARCOS     Modified Curry Scale 4 - Moderately severe disability.  Unable to walk without assistance, and unable to attend to own bodily needs without assistance.  -       Row Name 06/15/24 1009          Functional Assessment    Outcome Measure Options AM-PAC 6 Clicks Basic Mobility (PT);Modified Alicia  -               User Key  (r) = Recorded By, (t) = Taken By, (c) = Cosigned By      Initials Name Provider Type     Lisa De Los Santos, PT Physical Therapist    Phylicia Weston, RN Registered Nurse                                 Physical Therapy Education       Title: PT OT SLP Therapies (In Progress)       Topic: Physical Therapy (In Progress)       Point: Mobility training (Done)       Learning Progress Summary             Patient Acceptance, E,TB, VU by  at 6/15/2024 1010                         Point: Home exercise program (Not Started)       Learner Progress:  Not documented in this visit.              Point: Body mechanics (Done)       Learning Progress Summary             Patient Acceptance, E,TB, VU by  at 6/15/2024 1010                         Point: Precautions (Done)       Learning Progress Summary             Patient Acceptance, E,TB, VU by  at 6/15/2024 1010                                         User Key       Initials Effective Dates Name Provider Type Discipline    MARCOS 08/23/21 -  Lisa Fuller, SANDY Physical Therapist PT                  PT Recommendation and Plan  Planned Therapy Interventions (PT): balance training, bed mobility training, gait training, home exercise program, patient/family education, strengthening, neuromuscular re-education, transfer training  Plan of Care Reviewed With: patient  Outcome Evaluation: Pt is a 62 y/o male who presents to [facility] with sensation deficits in the LLE and weakness. Pt here for possible TIA and worsening diabetic neuropathy. MRI brain (-) and CT angio head and neck (-) for large vessel occlusion. PMH significant for morbid obesity, HTN, diabetic peripheral neuropathy, and chronic low back pain. At [facility] pt uses a cane and wheelchair for mobility. He reports multiple falls. Pt does not utilize home O2, but at this time is requiring 5L. He presents with decreased LLE strength and sensation deficits compared to the R as well as LUE strength deficits. Pt becomes dizzy and reports having a headache when coming to the EOB and has difficulty maintaining eyes open. BP  WNL: supine 145/79, seated /88, and supine 150/80. Pt reports improved symptoms once returned to supine. He requires mod A for bed mobility and noted 3-/5 RLE, 2+/5 LLE strength grossly. Pt was given stroke prevention handout and given education regarding stroke signs/symptoms. Pt will need acute inpatient rehab at d/c.     Time Calculation:         PT Charges       Row Name 06/15/24 1011             Time Calculation    Start Time 0825  -MARCOS      Stop Time 0858  -MARCOS      Time Calculation (min) 33 min  -MARCOS      PT Received On 06/15/24  -MARCOS      PT - Next Appointment 06/17/24  -MARCOS      PT Goal Re-Cert Due Date 06/29/24  -MARCOS                User Key  (r) = Recorded By, (t) = Taken By, (c) = Cosigned By      Initials Name Provider Type    Lisa De Los Santos, PT Physical Therapist                  Therapy Charges for Today       Code Description Service Date Service Provider Modifiers Qty    [id number] HC PT EVAL MOD COMPLEXITY 4 6/15/2024 Lisa Fuller, PT GP 1            PT G-Codes  Outcome Measure Options: AM-PAC 6 Clicks Basic Mobility (PT), Modified [place]  AM-PAC 6 Clicks Score (PT): 13  Modified [place] Scale: 4 - Moderately severe disability.  Unable to walk without assistance, and unable to attend to own bodily needs without assistance.  PT Discharge Summary  Anticipated Discharge Disposition (PT): inpatient rehabilitation facility    Lisa Fuller, SANDY  6/15/2024      Electronically signed by Lisa Fuller, PT at 06/15/24 1014       Earl Simmons PT at 06/17/24 0944  Version 1 of 1         Subjective: Pt agreeable to therapeutic plan of care.    Objective:     Bed mobility -  Min A at trunk   Transfers -  Mod A x 2 with RW with narrow CHANTAL  Ambulation -  5' Min/Mod A x 2 with RW    Therapeutic Exercise -  Completed 10 reps each of BLE strengthening exercises in sitting: AP, LAQs, hip flexion, GS with rest breaks needed after 5 reps of each exercise    Vitals: WNL    Pain: 8 VAS   Location:  "back  Intervention for pain: Repositioned, Increased Activity, and Therapeutic Presence    Education: Provided education on the importance of mobility in the acute care setting, Verbal/Tactile Cues, Transfer Training, Gait Training, and Energy conservation strategies    Assessment: Matthieu garcia    Plan/Recommendations:   If medically appropriate, Moderate Intensity Therapy recommended post-acute care. This is recommended as therapy feels the patient would require 3-4 days per week and wouldn't tolerate \"3 hour daily\" rehab intensity. SNF would be the preferred choice. If the patient does not agree to SNF, arrange HH or OP depending on home bound status. If patient is medically complex, consider LTACH. Pt requires no DME at discharge.     Pt desires Skilled Rehab placement at discharge. Pt cooperative; agreeable to therapeutic recommendations and plan of care.       Modified Alicia: 4 = Moderately severe disability (Unable to attend to own bodily needs without assistance, and unable to walk unassisted)     Post-Tx Position: Up in Chair, Alarms activated, and Call light and personal items within reach  PPE: gloves     Electronically signed by Earl Simmons, PT at 06/17/24 0948       Earl Simmons, PT at 06/17/24 0958  Version 1 of 1         Pt presents with functional mobility impairments which indicate the need for skilled intervention. Tolerating session today without incident. Pt on 2L O2 and telemetry this date.  Min A for bed mobility, Mod A x 2 with RW for transfer with cues to widen CHANTAL during standing.  Ambulated 5' with RW with Min/Mod A with flexed trunk, decreased balance, decreased endurance and and narrow CHANTAL.  Completed 10 reps each of BLE strengthening exercises in sitting.  Will continue to follow and progress as tolerated.                                                 Electronically signed by Earl Simmons, PT at 06/17/24 0927          Occupational Therapy Notes (all)        Amparo Smith, OT " at 24 1425          Patient Name: Matthieu Hawk  : 1960    MRN: [id number]                              Today's Date: 2024       Admit Date: 2024    Visit Dx:     ICD-10-CM ICD-9-CM   1. Weakness  R53.1 780.79   2. Renal insufficiency  N28.9 593.9   3. Uncontrolled type 2 diabetes mellitus with hyperglycemia  E11.65 250.02     Patient Active Problem List   Diagnosis    TIA (transient ischemic attack)     History reviewed. No pertinent past medical history.  History reviewed. No pertinent surgical history.   General Information       Row Name 24 1409          OT Time and Intention    Document Type evaluation  -MS     Mode of Treatment occupational therapy  -MS       Row Name 24 1409          General Information    Patient Profile Reviewed yes  -MS     Prior Level of Function min assist:;ADL's;independent:;all household mobility;community mobility;driving;transfer  -MS     Existing Precautions/Restrictions fall;oxygen therapy device and L/min  -MS     Barriers to Rehab medically complex;previous functional deficit  -MS       Row Name 24 1409          Occupational Profile    Reason for Services/Referral (Occupational Profile) Pt is a 64 y/o M admitted to [facility] 24 with c/o LLE numbness and BLE weakness, and slurred speech; dx TIA. CT head (-) acute. CT angiogram head/neck (-) acute. CT cerebral perfusion (-) completed infarct. CXR (-) acute. MRI brain (-) acute. [place] carotid duplex indicates normal flow L/R ICA. MRI thoracic/lumbar spine (-) osseous findings, (-) intramedullary cord signal abnormality, lumbar spine (+) moderate spinal canal and neuro-foraminal narrowing. PMHx significant for HTN, diabetic peripheral neuropathy, DMII and chronic LBP. At baseline pt resides with spouse in ground floor apartment [address]. Pt typically ambulates with quad cane for short distance, electric scooter long distance, active . Pt spouse assist with socks, shoes, and threading pants.  Pt had x3 falls 6/14/24, additional 7 falls within 3 mos, unable to get up once down.  -MS     Environmental Supports and Barriers (Occupational Profile) supportive family  -MS       Row Name 06/16/24 1409          Living Environment    People in Home spouse  -MS       Row Name 06/16/24 1409          Home Main Entrance    Number of Stairs, Main Entrance none  -MS       Row Name 06/16/24 1409          Stairs Within Home, Primary    Number of Stairs, Within Home, Primary none  -MS       Row Name 06/16/24 1409          Cognition    Orientation Status (Cognition) oriented x 4  -MS       Row Name 06/16/24 1409          Safety Issues, Functional Mobility    Impairments Affecting Function (Mobility) balance;endurance/activity tolerance;pain;postural/trunk control;range of motion (ROM);strength;shortness of breath  -MS               User Key  (r) = Recorded By, (t) = Taken By, (c) = Cosigned By      Initials Name Provider Type    Amparo Genao OT Occupational Therapist                     Mobility/ADL's       Row Name 06/16/24 1409          Bed Mobility    Bed Mobility supine-sit  -MS     Supine-Sit [place] (Bed Mobility) minimum assist (75% patient effort)  -MS     Assistive Device (Bed Mobility) head of bed elevated;bed rails  -MS     Comment, (Bed Mobility) significant reliance on bed rail  -MS       Row Name 06/16/24 1409          Transfers    Transfers sit-stand transfer;bed-chair transfer  -MS       Row Name 06/16/24 1409          Bed-Chair Transfer    Bed-Chair [place] (Transfers) moderate assist (50% patient effort);2 person assist  -MS     Assistive Device (Bed-Chair Transfers) walker, front-wheeled  -MS     Comment, (Bed-Chair Transfer) tremulous with transfer  -MS       Row Name 06/16/24 1409          Sit-Stand Transfer    Sit-Stand [place] (Transfers) maximum assist (25% patient effort);2 person assist  -MS     Assistive Device (Sit-Stand Transfers) walker, front-wheeled  -MS     Comment,  (Sit-Stand Transfer) tremulous with transfer  -MS       Row Name 06/16/24 1409          Activities of Daily Living    BADL Assessment/Intervention lower body dressing  -MS       Row Name 06/16/24 1409          Lower Body Dressing Assessment/Training    [place] Level (Lower Body Dressing) don;socks;maximum assist (25% patient effort);pants/bottoms  -MS     Position (Lower Body Dressing) edge of bed sitting;supported standing  -MS     Comment, (Lower Body Dressing) requires assist with pulling up pants as pt supports self with BUE on RW  -MS               User Key  (r) = Recorded By, (t) = Taken By, (c) = Cosigned By      Initials Name Provider Type    Amparo Genao OT Occupational Therapist                   Obj/Interventions       Row Name 06/16/24 1412          Sensory Assessment (Somatosensory)    Sensory Assessment (Somatosensory) UE sensation intact  -MS       Row Name 06/16/24 1412          Vision Assessment/Intervention    Visual Impairment/Limitations WFL  -MS       Row Name 06/16/24 1412          Range of Motion Comprehensive    Comment, General Range of Motion BUE WFL  -MS       Row Name 06/16/24 1412          Strength Comprehensive (MMT)    Comment, General Manual Muscle Testing (MMT) Assessment BUE grossly 3+/5  -MS       Row Name 06/16/24 1412          Balance    Balance Assessment sitting static balance;sitting dynamic balance;standing static balance;standing dynamic balance  -MS     Static Sitting Balance standby assist  -MS     Dynamic Sitting Balance contact guard;minimal assist  -MS     Position, Sitting Balance unsupported;sitting edge of bed  -MS     Static Standing Balance moderate assist;2-person assist  -MS     Dynamic Standing Balance moderate assist;2-person assist  -MS     Position/Device Used, Standing Balance supported;walker, front-wheeled  -MS               User Key  (r) = Recorded By, (t) = Taken By, (c) = Cosigned By      Initials Name Provider Type    Amparo Genao, OT  Occupational Therapist                   Goals/Plan       Row Name 06/16/24 1424          Bed Mobility Goal 1 (OT)    Activity/Assistive Device (Bed Mobility Goal 1, OT) bed mobility activities, all  -MS     [place] Level/Cues Needed (Bed Mobility Goal 1, OT) standby assist  -MS     Time Frame (Bed Mobility Goal 1, OT) long term goal (LTG);2 weeks  -MS     Progress/Outcomes (Bed Mobility Goal 1, OT) new goal  -MS       Row Name 06/16/24 1424          Transfer Goal 1 (OT)    Activity/Assistive Device (Transfer Goal 1, OT) transfers, all  -MS     [place] Level/Cues Needed (Transfer Goal 1, OT) minimum assist (75% or more patient effort)  -MS     Time Frame (Transfer Goal 1, OT) long term goal (LTG);2 weeks  -MS     Progress/Outcome (Transfer Goal 1, OT) new goal  -MS       Row Name 06/16/24 1424          Toileting Goal 1 (OT)    Activity/Device (Toileting Goal 1, OT) toileting skills, all  -MS     [place] Level/Cues Needed (Toileting Goal 1, OT) minimum assist (75% or more patient effort)  -MS     Time Frame (Toileting Goal 1, OT) long term goal (LTG);2 weeks  -MS     Progress/Outcome (Toileting Goal 1, OT) new goal  -MS       Row Name 06/16/24 1424          Problem Specific Goal 1 (OT)    Problem Specific Goal 1 (OT) increase activity tolerance needed for ADL routine >5 minutes without rest break  -MS     Time Frame (Problem Specific Goal 1, OT) long term goal (LTG);2 weeks  -MS     Progress/Outcome (Problem Specific Goal 1, OT) new goal  -MS       Row Name 06/16/24 1424          Therapy Assessment/Plan (OT)    Planned Therapy Interventions (OT) activity tolerance training;adaptive equipment training;BADL retraining;functional balance retraining;IADL retraining;occupation/activity based interventions;passive ROM/stretching;patient/caregiver education/training;transfer/mobility retraining;strengthening exercise;ROM/therapeutic exercise  -MS               User Key  (r) = Recorded By, (t) = Taken By, (c)  = Cosigned By      Initials Name Provider Type    Amparo Genao, OT Occupational Therapist                   Clinical Impression       Row Name 06/16/24 1418          Pain Assessment    Pretreatment Pain Rating 7/10  -MS     Posttreatment Pain Rating 7/10  -MS     Pain Location - Side/Orientation Left  -MS     Pain Location lower  -MS     Pain Location - extremity  -MS     Pain Intervention(s) Repositioned;Emotional support  -MS       Row Name 06/16/24 1418          Plan of Care Review    Plan of Care Reviewed With patient  -MS     Progress no change  -MS     Outcome Evaluation Pt is a 64 y/o M [facility]24 with c/o LLE numbness and BLE weakness, and slurred speech; dx TIA. At baseline pt resides with spouse in ground floor apartment [address]. Pt typically ambulates with quad cane for short distance, electric scooter long distance, active . Pt spouse assist with socks, shoes, and threading pants. Pt had x3 falls 6/14/24, additional 7 falls within 3 mos, unable to get up once down. This date pt A&Ox4, on 2L O2 and in supine upon arrival. Pt titrated to RA, saturating WNL throughout, placed back on 2L post session. Pt requires min A supine to sit, max A x2 RW to come to standing and mod A x2 RW to transfer from bed to chair. Pt tremulous when on feet, appears to have global BLE weakness, limiting further mobility. Pt requires max A to don socks and to don pants. Pt functioning far below baseline and with significant hx of falls, pt is not safe to dc home at this time. OT recomending SNF when medically appropriate for dc, OT will follow while admitted.  -MS       Row Name 06/16/24 1418          Therapy Plan Review/Discharge Plan (OT)    Equipment Needs Upon Discharge (OT) walker, rolling  -MS     Anticipated Discharge Disposition (OT) skilled nursing facility  -MS       Row Name 06/16/24 1418          Vital Signs    Pre SpO2 (%) 94  -MS     O2 Delivery Pre Treatment nasal cannula  -MS     O2 Delivery  Intra Treatment room air  -MS     Post SpO2 (%) 93  -MS     O2 Delivery Post Treatment nasal cannula  -MS     Pre Patient Position Supine  -MS     Intra Patient Position Standing  -MS     Post Patient Position Sitting  -MS       Row Name 06/16/24 1418          Positioning and Restraints    Pre-Treatment Position in bed  -MS     Post Treatment Position chair  -MS     In Chair notified nsg;sitting;call light within reach;encouraged to call for assist;exit alarm on;with family/caregiver  -MS               User Key  (r) = Recorded By, (t) = Taken By, (c) = Cosigned By      Initials Name Provider Type    MS Amparo Smith, OT Occupational Therapist                   Outcome Measures       Row Name 06/16/24 1425          How much help from another is currently needed...    Putting on and taking off regular lower body clothing? 2  -MS     Bathing (including washing, rinsing, and drying) 2  -MS     Toileting (which includes using toilet bed pan or urinal) 2  -MS     Putting on and taking off regular upper body clothing 3  -MS     Taking care of personal grooming (such as brushing teeth) 3  -MS     Eating meals 3  -MS     AM-PAC 6 Clicks Score (OT) 15  -MS       Row Name 06/16/24 0838          How much help from another person do you currently need...    Turning from your back to your side while in flat bed without using bedrails? 3  -DH     Moving from lying on back to sitting on the side of a flat bed without bedrails? 3  -DH     Moving to and from a bed to a chair (including a wheelchair)? 3  -DH     Standing up from a chair using your arms (e.g., wheelchair, bedside chair)? 3  -DH     Climbing 3-5 steps with a railing? 3  -DH     To walk in hospital room? 3  -DH     AM-PAC 6 Clicks Score (PT) 18  -DH     Highest Level of Mobility Goal 6 --> Walk 10 steps or more  -DH       Row Name 06/16/24 1425          Modified [place] Scale    Pre-Stroke Modified [place] Scale 0 - No Symptoms at all.  -MS     Modified [place] Scale 4 -  Moderately severe disability.  Unable to walk without assistance, and unable to attend to own bodily needs without assistance.  -MS       Row Name 06/16/24 1425          Functional Assessment    Outcome Measure Options AM-PAC 6 Clicks Daily Activity (OT);Modified [place]  -MS               User Key  (r) = Recorded By, (t) = Taken By, (c) = Cosigned By      Initials Name Provider Type     Isela Conteh RN Registered Nurse    Amparo Genao OT Occupational Therapist                    Occupational Therapy Education       Title: PT OT SLP Therapies (Done)       Topic: Occupational Therapy (Done)       Point: ADL training (Done)       Description:   Instruct learner(s) on proper safety adaptation and remediation techniques during self care or transfers.   Instruct in proper use of assistive devices.                  Learning Progress Summary             Patient Acceptance, E,TB, VU by MS at 6/16/2024 1425                         Point: Precautions (Done)       Description:   Instruct learner(s) on prescribed precautions during self-care and functional transfers.                  Learning Progress Summary             Patient Acceptance, E,TB, VU by MS at 6/16/2024 1425                         Point: Body mechanics (Done)       Description:   Instruct learner(s) on proper positioning and spine alignment during self-care, functional mobility activities and/or exercises.                  Learning Progress Summary             Patient Acceptance, E,TB, VU by MS at 6/16/2024 1425                                         User Key       Initials Effective Dates Name Provider Type Discipline    MS 07/13/22 -  Amparo Smith OT Occupational Therapist OT                  OT Recommendation and Plan  Planned Therapy Interventions (OT): activity tolerance training, adaptive equipment training, BADL retraining, functional balance retraining, IADL retraining, occupation/activity based interventions, passive ROM/stretching,  patient/caregiver education/training, transfer/mobility retraining, strengthening exercise, ROM/therapeutic exercise  Plan of Care Review  Plan of Care Reviewed With: patient  Progress: no change  Outcome Evaluation: Pt is a 62 y/o M admitted to [facility] 6/14/24 with c/o LLE numbness and BLE weakness, and slurred speech; dx TIA. At baseline pt resides with spouse in ground floor [address]. Pt typically ambulates with quad cane for short distance, electric scooter long distance, active . Pt spouse assist with socks, shoes, and threading pants. Pt had x3 falls 6/14/24, additional 7 falls within 3 mos, unable to get up once down. This date pt A&Ox4, on 2L O2 and in supine upon arrival. Pt titrated to RA, saturating WNL throughout, placed back on 2L post session. Pt requires min A supine to sit, max A x2 RW to come to standing and mod A x2 RW to transfer from bed to chair. Pt tremulous when on feet, appears to have global BLE weakness, limiting further mobility. Pt requires max A to don socks and to don pants. Pt functioning far below baseline and with significant hx of falls, pt is not safe to dc home at this time. OT recomending SNF when medically appropriate for dc, OT will follow while admitted.     Time Calculation:                   Amparo Smith OT  6/16/2024    Electronically signed by Amparo Smith OT at 06/16/24 1425       Amparo Smith OT at 06/16/24 1425          Goal Outcome Evaluation:  Plan of Care Reviewed With: patient        Progress: no change  Outcome Evaluation: Pt is a 62 y/o M admitted to [facility] 6/14/24 with c/o LLE numbness and BLE weakness, and slurred speech; dx TIA. At baseline pt resides with spouse in ground floor apartment [address]. Pt typically ambulates with quad cane for short distance, electric scooter long distance, active . Pt spouse assist with socks, shoes, and threading pants. Pt had x3 falls 6/14/24, additional 7 falls within 3 mos, unable to get up once down. This  date pt A&Ox4, on 2L O2 and in supine upon arrival. Pt titrated to RA, saturating WNL throughout, placed back on 2L post session. Pt requires min A supine to sit, max A x2 RW to come to standing and mod A x2 RW to transfer from bed to chair. Pt tremulous when on feet, appears to have global BLE weakness, limiting further mobility. Pt requires max A to don socks and to don pants. Pt functioning far below baseline and with significant hx of falls, pt is not safe to dc home at this time. OT recomending SNF when medically appropriate for dc, OT will follow while admitted.      Anticipated Discharge Disposition (OT): skilled nursing facility                          Electronically signed by Amparo Smith OT at 06/16/24 1425          Speech Language Pathology Notes (all)        Amber Knutson, SLP at 06/16/24 [id number]          Goal Outcome Evaluation:   Orders received for speech/language eval. Pt's imaging is negative for new CVA and he is at baseline cognitively. No speech/lang eval is indicated. ST will sign off. Please re-consult if any further needs arise.                                              Electronically signed by Amber Knutson, SLP at 06/16/24 [id number]

## 2024-06-18 VITALS
DIASTOLIC BLOOD PRESSURE: 51 MMHG | WEIGHT: 315 LBS | HEIGHT: 70 IN | BODY MASS INDEX: 45.1 KG/M2 | SYSTOLIC BLOOD PRESSURE: 107 MMHG | TEMPERATURE: 97.6 F | RESPIRATION RATE: 14 BRPM | OXYGEN SATURATION: 98 % | HEART RATE: 79 BPM

## 2024-06-18 LAB
ANION GAP SERPL CALCULATED.3IONS-SCNC: 10.4 MMOL/L (ref 5–15)
BASOPHILS # BLD AUTO: 0.07 10*3/MM3 (ref 0–0.2)
BASOPHILS NFR BLD AUTO: 0.7 % (ref 0–1.5)
BUN SERPL-MCNC: 29 MG/DL (ref 8–23)
BUN/CREAT SERPL: 17.8 (ref 7–25)
CALCIUM SPEC-SCNC: 9.5 MG/DL (ref 8.6–10.5)
CHLORIDE SERPL-SCNC: 95 MMOL/L (ref 98–107)
CO2 SERPL-SCNC: 30.6 MMOL/L (ref 22–29)
CREAT SERPL-MCNC: 1.63 MG/DL (ref 0.76–1.27)
DEPRECATED RDW RBC AUTO: 43.5 FL (ref 37–54)
EGFRCR SERPLBLD CKD-EPI 2021: 46.8 ML/MIN/1.73
EOSINOPHIL # BLD AUTO: 0.23 10*3/MM3 (ref 0–0.4)
EOSINOPHIL NFR BLD AUTO: 2.2 % (ref 0.3–6.2)
ERYTHROCYTE [DISTWIDTH] IN BLOOD BY AUTOMATED COUNT: 13.2 % (ref 12.3–15.4)
GLUCOSE BLDC GLUCOMTR-MCNC: 119 MG/DL (ref 70–105)
GLUCOSE BLDC GLUCOMTR-MCNC: 133 MG/DL (ref 70–105)
GLUCOSE BLDC GLUCOMTR-MCNC: 198 MG/DL (ref 70–105)
GLUCOSE BLDC GLUCOMTR-MCNC: 86 MG/DL (ref 70–105)
GLUCOSE BLDC GLUCOMTR-MCNC: 89 MG/DL (ref 70–105)
GLUCOSE SERPL-MCNC: 78 MG/DL (ref 65–99)
HCT VFR BLD AUTO: 39.2 % (ref 37.5–51)
HGB BLD-MCNC: 12.4 G/DL (ref 13–17.7)
IMM GRANULOCYTES # BLD AUTO: 0.04 10*3/MM3 (ref 0–0.05)
IMM GRANULOCYTES NFR BLD AUTO: 0.4 % (ref 0–0.5)
LYMPHOCYTES # BLD AUTO: 3.31 10*3/MM3 (ref 0.7–3.1)
LYMPHOCYTES NFR BLD AUTO: 31.3 % (ref 19.6–45.3)
MCH RBC QN AUTO: 28.4 PG (ref 26.6–33)
MCHC RBC AUTO-ENTMCNC: 31.6 G/DL (ref 31.5–35.7)
MCV RBC AUTO: 89.7 FL (ref 79–97)
MONOCYTES # BLD AUTO: 0.96 10*3/MM3 (ref 0.1–0.9)
MONOCYTES NFR BLD AUTO: 9.1 % (ref 5–12)
NEUTROPHILS NFR BLD AUTO: 5.96 10*3/MM3 (ref 1.7–7)
NEUTROPHILS NFR BLD AUTO: 56.3 % (ref 42.7–76)
NRBC BLD AUTO-RTO: 0 /100 WBC (ref 0–0.2)
PLATELET # BLD AUTO: 361 10*3/MM3 (ref 140–450)
PMV BLD AUTO: 10.3 FL (ref 6–12)
POTASSIUM SERPL-SCNC: 4.3 MMOL/L (ref 3.5–5.2)
RBC # BLD AUTO: 4.37 10*6/MM3 (ref 4.14–5.8)
SODIUM SERPL-SCNC: 136 MMOL/L (ref 136–145)
WBC NRBC COR # BLD AUTO: 10.57 10*3/MM3 (ref 3.4–10.8)

## 2024-06-18 PROCEDURE — 97530 THERAPEUTIC ACTIVITIES: CPT

## 2024-06-18 PROCEDURE — 80048 BASIC METABOLIC PNL TOTAL CA: CPT | Performed by: HOSPITALIST

## 2024-06-18 PROCEDURE — 82948 REAGENT STRIP/BLOOD GLUCOSE: CPT | Performed by: STUDENT IN AN ORGANIZED HEALTH CARE EDUCATION/TRAINING PROGRAM

## 2024-06-18 PROCEDURE — 97116 GAIT TRAINING THERAPY: CPT | Performed by: PHYSICAL THERAPIST

## 2024-06-18 PROCEDURE — 25010000002 HEPARIN (PORCINE) PER 1000 UNITS: Performed by: STUDENT IN AN ORGANIZED HEALTH CARE EDUCATION/TRAINING PROGRAM

## 2024-06-18 PROCEDURE — 82948 REAGENT STRIP/BLOOD GLUCOSE: CPT

## 2024-06-18 PROCEDURE — 63710000001 INSULIN LISPRO (HUMAN) PER 5 UNITS: Performed by: STUDENT IN AN ORGANIZED HEALTH CARE EDUCATION/TRAINING PROGRAM

## 2024-06-18 PROCEDURE — 97535 SELF CARE MNGMENT TRAINING: CPT

## 2024-06-18 PROCEDURE — 85025 COMPLETE CBC W/AUTO DIFF WBC: CPT | Performed by: HOSPITALIST

## 2024-06-18 PROCEDURE — 94799 UNLISTED PULMONARY SVC/PX: CPT

## 2024-06-18 PROCEDURE — 97530 THERAPEUTIC ACTIVITIES: CPT | Performed by: PHYSICAL THERAPIST

## 2024-06-18 PROCEDURE — 94660 CPAP INITIATION&MGMT: CPT

## 2024-06-18 RX ORDER — DAPAGLIFLOZIN 5 MG/1
10 TABLET, FILM COATED ORAL DAILY
Qty: 30 TABLET | Refills: 1 | Status: SHIPPED | OUTPATIENT
Start: 2024-06-18

## 2024-06-18 RX ORDER — AMLODIPINE BESYLATE 10 MG/1
10 TABLET ORAL
Start: 2024-06-19

## 2024-06-18 RX ORDER — LOSARTAN POTASSIUM 100 MG/1
100 TABLET ORAL
Start: 2024-06-19

## 2024-06-18 RX ORDER — HYDROCODONE BITARTRATE AND ACETAMINOPHEN 7.5; 325 MG/1; MG/1
1 TABLET ORAL EVERY 6 HOURS PRN
Qty: 20 TABLET | Refills: 0 | Status: SHIPPED | OUTPATIENT
Start: 2024-06-18

## 2024-06-18 RX ORDER — PREGABALIN 75 MG/1
75 CAPSULE ORAL 2 TIMES DAILY
Qty: 30 CAPSULE | Refills: 0 | Status: SHIPPED | OUTPATIENT
Start: 2024-06-18

## 2024-06-18 RX ORDER — ASPIRIN 81 MG/1
81 TABLET, CHEWABLE ORAL DAILY
Start: 2024-06-19

## 2024-06-18 RX ADMIN — PREGABALIN 75 MG: 75 CAPSULE ORAL at 09:02

## 2024-06-18 RX ADMIN — Medication 10 ML: at 09:03

## 2024-06-18 RX ADMIN — ASPIRIN 81 MG CHEWABLE TABLET 81 MG: 81 TABLET CHEWABLE at 09:01

## 2024-06-18 RX ADMIN — CARVEDILOL 12.5 MG: 6.25 TABLET, FILM COATED ORAL at 09:01

## 2024-06-18 RX ADMIN — GLIPIZIDE 5 MG: 5 TABLET ORAL at 09:01

## 2024-06-18 RX ADMIN — INSULIN LISPRO 16 UNITS: 100 INJECTION, SOLUTION INTRAVENOUS; SUBCUTANEOUS at 13:33

## 2024-06-18 RX ADMIN — FINASTERIDE 5 MG: 5 TABLET, FILM COATED ORAL at 09:01

## 2024-06-18 RX ADMIN — PANTOPRAZOLE SODIUM 40 MG: 40 TABLET, DELAYED RELEASE ORAL at 09:02

## 2024-06-18 RX ADMIN — HEPARIN SODIUM 5000 UNITS: 5000 INJECTION INTRAVENOUS; SUBCUTANEOUS at 05:10

## 2024-06-18 RX ADMIN — HYDROCHLOROTHIAZIDE 12.5 MG: 12.5 TABLET ORAL at 09:02

## 2024-06-18 RX ADMIN — DULOXETINE HYDROCHLORIDE 60 MG: 30 CAPSULE, DELAYED RELEASE ORAL at 09:01

## 2024-06-18 RX ADMIN — INSULIN LISPRO 13 UNITS: 100 INJECTION, SOLUTION INTRAVENOUS; SUBCUTANEOUS at 09:02

## 2024-06-18 RX ADMIN — HYDROCODONE BITARTRATE AND ACETAMINOPHEN 1 TABLET: 10; 325 TABLET ORAL at 03:56

## 2024-06-18 RX ADMIN — CELECOXIB 200 MG: 200 CAPSULE ORAL at 09:02

## 2024-06-18 RX ADMIN — FOLIC ACID 1 MG: 1 TABLET ORAL at 09:01

## 2024-06-18 RX ADMIN — AMLODIPINE BESYLATE 10 MG: 5 TABLET ORAL at 09:01

## 2024-06-18 RX ADMIN — LOSARTAN POTASSIUM 100 MG: 50 TABLET, FILM COATED ORAL at 09:01

## 2024-06-18 RX ADMIN — CLOPIDOGREL BISULFATE 75 MG: 75 TABLET ORAL at 09:01

## 2024-06-18 NOTE — CASE MANAGEMENT/SOCIAL WORK
Continued Stay Note  SHANT Ambrosio     Patient Name: Matthieu Hawk  MRN: [id number]  Today's Date: 6/18/2024    Admit Date: 6/14/2024    Plan: D/C Plan: [facility].  Auth good 6/18/24-6/25/24.  PASRR approved.   Discharge Plan       Row Name 06/18/24 0823       Plan    Plan D/C Plan: [facility].  Auth good 6/18/24-6/25/24.  PASRR approved.                   Discharge Codes    No documentation.                 Expected Discharge Date and Time       Expected Discharge Date Expected Discharge Time    Jun 18, 2024               Sade Valero RN

## 2024-06-18 NOTE — PLAN OF CARE
Problem: Adult Inpatient Plan of Care  Goal: Plan of Care Review  Outcome: Ongoing, Progressing  Goal: Patient-Specific Goal (Individualized)  Outcome: Ongoing, Progressing  Goal: Absence of Hospital-Acquired Illness or Injury  Outcome: Ongoing, Progressing  Intervention: Identify and Manage Fall Risk  Recent Flowsheet Documentation  Taken 6/18/2024 1000 by Rentz, Amy A, LPN  Safety Promotion/Fall Prevention:   activity supervised   assistive device/personal items within reach   clutter free environment maintained   fall prevention program maintained   nonskid shoes/slippers when out of bed   room organization consistent   safety round/check completed  Taken 6/18/2024 0800 by Amy Escobar LPN  Safety Promotion/Fall Prevention:   activity supervised   assistive device/personal items within reach   clutter free environment maintained   fall prevention program maintained   nonskid shoes/slippers when out of bed   room organization consistent   safety round/check completed  Intervention: Prevent Skin Injury  Recent Flowsheet Documentation  Taken 6/18/2024 0900 by Amy Escobar LPN  Skin Protection:   adhesive use limited   tubing/devices free from skin contact  Intervention: Prevent and Manage VTE (Venous Thromboembolism) Risk  Recent Flowsheet Documentation  Taken 6/18/2024 0900 by Amy Escobar LPN  VTE Prevention/Management:   bilateral   sequential compression devices off   patient refused intervention  Range of Motion: active ROM (range of motion) encouraged  Intervention: Prevent Infection  Recent Flowsheet Documentation  Taken 6/18/2024 1000 by Amy Escobar LPN  Infection Prevention: hand hygiene promoted  Taken 6/18/2024 0800 by Amy Escobar LPN  Infection Prevention: hand hygiene promoted  Goal: Optimal Comfort and Wellbeing  Outcome: Ongoing, Progressing  Intervention: Provide Person-Centered Care  Recent Flowsheet Documentation  Taken 6/18/2024 0900 by Amy Escobar  A, LPN  Trust Relationship/Rapport:   care explained   thoughts/feelings acknowledged  Goal: Readiness for Transition of Care  Outcome: Ongoing, Progressing     Problem: Skin Injury Risk Increased  Goal: Skin Health and Integrity  Outcome: Ongoing, Progressing  Intervention: Optimize Skin Protection  Recent Flowsheet Documentation  Taken 6/18/2024 0900 by Amy Escobar LPN  Pressure Reduction Techniques:   frequent weight shift encouraged   weight shift assistance provided  Pressure Reduction Devices:   positioning supports utilized   pressure-redistributing mattress utilized  Skin Protection:   adhesive use limited   tubing/devices free from skin contact     Problem: COPD (Chronic Obstructive Pulmonary Disease) Comorbidity  Goal: Maintenance of COPD Symptom Control  Outcome: Ongoing, Progressing  Intervention: Maintain COPD-Symptom Control  Recent Flowsheet Documentation  Taken 6/18/2024 1000 by Amy Escobar LPN  Medication Review/Management: medications reviewed  Taken 6/18/2024 0900 by Amy Escobar LPN  Supportive Measures: active listening utilized  Taken 6/18/2024 0800 by Amy Escobar LPN  Medication Review/Management: medications reviewed     Problem: Diabetes Comorbidity  Goal: Blood Glucose Level Within Targeted Range  Outcome: Ongoing, Progressing  Intervention: Monitor and Manage Glycemia  Recent Flowsheet Documentation  Taken 6/18/2024 0900 by Amy Escobar LPN  Glycemic Management: blood glucose monitored     Problem: Hypertension Comorbidity  Goal: Blood Pressure in Desired Range  Outcome: Ongoing, Progressing  Intervention: Maintain Blood Pressure Management  Recent Flowsheet Documentation  Taken 6/18/2024 1000 by Amy Escobar LPN  Medication Review/Management: medications reviewed  Taken 6/18/2024 0800 by Amy Escobar LPN  Medication Review/Management: medications reviewed     Problem: Obstructive Sleep Apnea Risk or Actual Comorbidity Management  Goal:  Unobstructed Breathing During Sleep  Outcome: Ongoing, Progressing     Problem: Fall Injury Risk  Goal: Absence of Fall and Fall-Related Injury  Outcome: Ongoing, Progressing  Intervention: Identify and Manage Contributors  Recent Flowsheet Documentation  Taken 6/18/2024 1000 by Amy Escobar LPN  Medication Review/Management: medications reviewed  Taken 6/18/2024 0800 by Amy Escobar LPN  Medication Review/Management: medications reviewed  Intervention: Promote Injury-Free Environment  Recent Flowsheet Documentation  Taken 6/18/2024 1000 by [place], Amy A, LPN  Safety Promotion/Fall Prevention:   activity supervised   assistive device/personal items within reach   clutter free environment maintained   fall prevention program maintained   nonskid shoes/slippers when out of bed   room organization consistent   safety round/check completed  Taken 6/18/2024 0800 by Amy Escobar LPN  Safety Promotion/Fall Prevention:   activity supervised   assistive device/personal items within reach   clutter free environment maintained   fall prevention program maintained   nonskid shoes/slippers when out of bed   room organization consistent   safety round/check completed     Problem: Noninvasive Ventilation Acute  Goal: Effective Unassisted Ventilation and Oxygenation  Outcome: Ongoing, Progressing   Goal Outcome Evaluation:

## 2024-06-18 NOTE — THERAPY TREATMENT NOTE
"Subjective: Pt agreeable to therapeutic plan of care.  Reports he is feeling a little better.  Is ready to go to rehab.     Objective:     Bed mobility - SBA supine to sit.   Transfers - CGA, Min-A, and with rolling walker STS.  Required min A for balance in standing with transfer.   Ambulation - 25 feet CGA, Min-A, and with rolling walker  Static standing balance: CGA to min A   Dynamic standing balance: CGA to min A   *Gait belt applied and non-skid socks worn during treatment.       Vitals: WNL    Pain: 6 VAS   Location: L hip region   Intervention for pain: Repositioned and Therapeutic Presence    Education: Provided education on the importance of mobility in the acute care setting and Verbal/Tactile Cues    Assessment: Matthieu Hawk presents with functional mobility impairments which indicate the need for skilled intervention. Tolerating session today, but fatigued quickly.  Pt was able to increase his ambulation distance; however, LLE nearly buckled.  Pt wanted to attempt to ambulate further, but BLE became too weak and shaky.  Cues provided to improve his quad and glute activation.  Recliner was then pulled behind pt for him to sit.  Will continue to follow and progress as tolerated.     Plan/Recommendations:   If medically appropriate, Moderate Intensity Therapy recommended post-acute care. This is recommended as therapy feels the patient would require 3-4 days per week and wouldn't tolerate \"3 hour daily\" rehab intensity. SNF would be the preferred choice. If the patient does not agree to SNF, arrange HH or OP depending on home bound status. If patient is medically complex, consider LTACH. Pt requires no DME at discharge.     Pt desires Skilled Rehab placement at discharge. Pt cooperative; agreeable to therapeutic recommendations and plan of care.     Basic Mobility 6-click:  Rollin = Total, A lot = 2, A little = 3; 4 = None  Supine>Sit:   1 = Total, A lot = 2, A little = 3; 4 = None   Sit>Stand " with arms:  1 = Total, A lot = 2, A little = 3; 4 = None  Bed>Chair:   1 = Total, A lot = 2, A little = 3; 4 = None  Ambulate in room:  1 = Total, A lot = 2, A little = 3; 4 = None  3-5 Steps with railin = Total, A lot = 2, A little = 3; 4 = None  Score: 17    Modified [place]: 4 = Moderately severe disability (Unable to attend to own bodily needs without assistance, and unable to walk unassisted)     Post-Tx Position: Up in Chair, Alarms activated, and Call light and personal items within reach  PPE: gloves

## 2024-06-18 NOTE — PLAN OF CARE
"Goal Outcome Evaluation:     Assessment: Matthieu Hawk presents with functional mobility impairments which indicate the need for skilled intervention. Tolerating session today, but fatigued quickly.  Pt was able to increase his ambulation distance; however, LLE nearly buckled.  Pt wanted to attempt to ambulate further, but BLE became too weak and shaky.  Cues provided to improve his quad and glute activation.  Recliner was then pulled behind pt for him to sit.  Will continue to follow and progress as tolerated.     Plan/Recommendations:   If medically appropriate, Moderate Intensity Therapy recommended post-acute care. This is recommended as therapy feels the patient would require 3-4 days per week and wouldn't tolerate \"3 hour daily\" rehab intensity. SNF would be the preferred choice. If the patient does not agree to SNF, arrange HH or OP depending on home bound status. If patient is medically complex, consider LTACH. Pt requires no DME at discharge.     Pt desires Skilled Rehab placement at discharge. Pt cooperative; agreeable to therapeutic recommendations and plan of care.                                         "

## 2024-06-18 NOTE — THERAPY TREATMENT NOTE
"Subjective: Pt agreeable to therapeutic plan of care.  Cognition: oriented to Person, Place, Time, and Situation    Objective:     Bed Mobility: SBA   Functional Transfers: CGA and Min-A     Balance: supported and standing CGA  Functional Ambulation: CGA, Min-A, and with rolling walker    Lower Body Dressing: Max-A  ADL Position: edge of bed sitting  ADL Comments: socks    Vitals: WNL    Pain: 0 VAS      Education: Provided education on the importance of mobility in the acute care setting, ADL training, and Transfer Training      Assessment: Matthieu Hawk presents with ADL impairments affecting function including balance, endurance / activity tolerance, pain, and strength. Demonstrated functioning below baseline abilities indicate the need for continued skilled intervention while inpatient. Pt t/f from supine to sitting on EOB with SBA.  Pt required MAX A to don socks.  Pt completed transfers with MIN A_CGA using RW.  Pt completed functional mobility with MIN A-CGA using RW.  Pt had instance of LLE buckling requiring chair to be brought up behind him.  Pt making great progress.Tolerating session today without incident. Will continue to follow and progress as tolerated.     Plan/Recommendations:   Moderate Intensity Therapy recommended post-acute care. This is recommended as therapy feels the patient would require 3-4 days per week and wouldn't tolerate \"3 hour daily\" rehab intensity. SNF would be the preferred choice. If the patient does not agree to SNF, arrange HH or OP depending on home bound status. If patient is medically complex, consider LTACH.. Pt requires no DME at discharge.     Pt desires Skilled Rehab placement at discharge. Pt cooperative; agreeable to therapeutic recommendations and plan of care.     Modified [place]: 4 = Moderately severe disability (Unable to attend to own bodily needs without assistance, and unable to walk unassisted)     Post-Tx Position: Up in Chair, Alarms activated, and Call " light and personal items within reach  PPE: gloves    Repair Performed By Another Provider Text (Leave Blank If You Do Not Want): After obtaining clear surgical margins the defect was repaired by another provider.

## 2024-06-18 NOTE — PLAN OF CARE
Problem: Adult Inpatient Plan of Care  Goal: Plan of Care Review  6/18/2024 0341 by Angeles Kendrick LPN  Outcome: Ongoing, Progressing  6/18/2024 0030 by Angeles Kendrick LPN  Outcome: Ongoing, Progressing  Goal: Patient-Specific Goal (Individualized)  6/18/2024 0341 by Angeles Kendrick LPN  Outcome: Ongoing, Progressing  6/18/2024 0030 by Angeles Kendrick LPN  Outcome: Ongoing, Progressing  Goal: Absence of Hospital-Acquired Illness or Injury  6/18/2024 0341 by Angeles Kendrick LPN  Outcome: Ongoing, Progressing  6/18/2024 0030 by Angeles Kendrick LPN  Outcome: Ongoing, Progressing  Intervention: Identify and Manage Fall Risk  Recent Flowsheet Documentation  Taken 6/18/2024 0026 by Angeles Kendrick LPN  Safety Promotion/Fall Prevention:   activity supervised   assistive device/personal items within reach  Taken 6/17/2024 2000 by Angeles Kendrick LPN  Safety Promotion/Fall Prevention:   activity supervised   assistive device/personal items within reach  Intervention: Prevent Skin Injury  Recent Flowsheet Documentation  Taken 6/18/2024 0026 by Angeles Kendrick LPN  Body Position: position changed independently  Skin Protection: adhesive use limited  Taken 6/17/2024 2000 by Angeles Kendrick LPN  Body Position: position changed independently  Skin Protection: adhesive use limited  Intervention: Prevent and Manage VTE (Venous Thromboembolism) Risk  Recent Flowsheet Documentation  Taken 6/18/2024 0026 by Angeles Kendrick LPN  Activity Management: activity encouraged  VTE Prevention/Management:   bilateral   patient refused intervention  Range of Motion: active ROM (range of motion) encouraged  Taken 6/17/2024 2000 by Angeles Kendrick LPN  Activity Management: activity encouraged  VTE Prevention/Management:   bilateral   sequential compression devices off   patient refused intervention  Intervention: Prevent Infection  Recent Flowsheet Documentation  Taken 6/18/2024 0026 by Angeles Kendrick LPN  Infection Prevention:    environmental surveillance performed   equipment surfaces disinfected  Taken 6/17/2024 2000 by Angeles Kendrick LPN  Infection Prevention:   environmental surveillance performed   equipment surfaces disinfected  Goal: Optimal Comfort and Wellbeing  6/18/2024 0341 by Angeles Kendrick LPN  Outcome: Ongoing, Progressing  6/18/2024 0030 by Angeles Kendrick LPN  Outcome: Ongoing, Progressing  Intervention: Provide Person-Centered Care  Recent Flowsheet Documentation  Taken 6/18/2024 0026 by Angeles Kendrick LPN  Trust Relationship/Rapport:   care explained   choices provided   questions encouraged   reassurance provided   thoughts/feelings acknowledged  Goal: Readiness for Transition of Care  6/18/2024 0341 by Angeles Kendrick LPN  Outcome: Ongoing, Progressing  6/18/2024 0030 by Angeles Kendrick LPN  Outcome: Ongoing, Progressing     Problem: Skin Injury Risk Increased  Goal: Skin Health and Integrity  6/18/2024 0341 by Angeles Kendrick LPN  Outcome: Ongoing, Progressing  6/18/2024 0030 by Angeles Kendrick LPN  Outcome: Ongoing, Progressing  Intervention: Optimize Skin Protection  Recent Flowsheet Documentation  Taken 6/18/2024 0026 by Angeles Kendrick LPN  Pressure Reduction Techniques: frequent weight shift encouraged  Head of Bed (HOB) Positioning: HOB elevated  Pressure Reduction Devices: pressure-redistributing mattress utilized  Skin Protection: adhesive use limited  Taken 6/17/2024 2000 by Angeles Kendrick LPN  Pressure Reduction Techniques: frequent weight shift encouraged  Head of Bed (HOB) Positioning: HOB elevated  Pressure Reduction Devices: pressure-redistributing mattress utilized  Skin Protection: adhesive use limited     Problem: COPD (Chronic Obstructive Pulmonary Disease) Comorbidity  Goal: Maintenance of COPD Symptom Control  6/18/2024 0341 by Angeles Kendrick LPN  Outcome: Ongoing, Progressing  6/18/2024 0030 by Angeles Kendrick LPN  Outcome: Ongoing, Progressing  Intervention: Maintain COPD-Symptom  Control  Recent Flowsheet Documentation  Taken 6/18/2024 0026 by Angeles Kendrick LPN  Medication Review/Management: medications reviewed  Taken 6/17/2024 2000 by Angeles Kendrick LPN  Medication Review/Management: medications reviewed     Problem: Diabetes Comorbidity  Goal: Blood Glucose Level Within Targeted Range  6/18/2024 0341 by Angeles Kendrick LPN  Outcome: Ongoing, Progressing  6/18/2024 0030 by Angeles Kendrick LPN  Outcome: Ongoing, Progressing     Problem: Hypertension Comorbidity  Goal: Blood Pressure in Desired Range  6/18/2024 0341 by Angeles Kendrick LPN  Outcome: Ongoing, Progressing  6/18/2024 0030 by Angeles Kendrick LPN  Outcome: Ongoing, Progressing  Intervention: Maintain Blood Pressure Management  Recent Flowsheet Documentation  Taken 6/18/2024 0026 by Angeles Kendrick LPN  Medication Review/Management: medications reviewed  Taken 6/17/2024 2000 by Angeles Kendrick LPN  Medication Review/Management: medications reviewed     Problem: Obstructive Sleep Apnea Risk or Actual Comorbidity Management  Goal: Unobstructed Breathing During Sleep  6/18/2024 0341 by Angeles Kendrick LPN  Outcome: Ongoing, Progressing  6/18/2024 0030 by Angeles Kendrick LPN  Outcome: Ongoing, Progressing     Problem: Fall Injury Risk  Goal: Absence of Fall and Fall-Related Injury  6/18/2024 0341 by Angeles Kendrick LPN  Outcome: Ongoing, Progressing  6/18/2024 0030 by Angeles Kendrick LPN  Outcome: Ongoing, Progressing  Intervention: Identify and Manage Contributors  Recent Flowsheet Documentation  Taken 6/18/2024 0026 by Angeles Kendrick LPN  Medication Review/Management: medications reviewed  Taken 6/17/2024 2000 by Angeles Kendrick LPN  Medication Review/Management: medications reviewed  Intervention: Promote Injury-Free Environment  Recent Flowsheet Documentation  Taken 6/18/2024 0026 by Angeles Kendrick LPN  Safety Promotion/Fall Prevention:   activity supervised   assistive device/personal items within reach  Taken  6/17/2024 2000 by Angeles Kendrick LPN  Safety Promotion/Fall Prevention:   activity supervised   assistive device/personal items within reach     Problem: Noninvasive Ventilation Acute  Goal: Effective Unassisted Ventilation and Oxygenation  6/18/2024 0341 by Angeles Kendrick LPN  Outcome: Ongoing, Progressing  6/18/2024 0030 by Angeles Kendrick LPN  Outcome: Ongoing, Progressing   Goal Outcome Evaluation:

## 2024-06-18 NOTE — PLAN OF CARE
Goal Outcome Evaluation:  Matthieu Hawk presents with ADL impairments affecting function including balance, endurance / activity tolerance, pain, and strength. Demonstrated functioning below baseline abilities indicate the need for continued skilled intervention while inpatient. Pt t/f from supine to sitting on EOB with SBA.  Pt required MAX A to don socks.  Pt completed transfers with MIN A_CGA using RW.  Pt completed functional mobility with MIN A-CGA using RW.  Pt had instance of LLE buckling requiring chair to be brought up behind him.  Pt making great progress.Tolerating session today without incident. Will continue to follow and progress as tolerated.

## 2024-06-18 NOTE — PLAN OF CARE
Problem: Adult Inpatient Plan of Care  Goal: Plan of Care Review  Outcome: Ongoing, Progressing  Goal: Patient-Specific Goal (Individualized)  Outcome: Ongoing, Progressing  Goal: Absence of Hospital-Acquired Illness or Injury  Outcome: Ongoing, Progressing  Intervention: Identify and Manage Fall Risk  Recent Flowsheet Documentation  Taken 6/18/2024 0026 by Angeles Kendrick LPN  Safety Promotion/Fall Prevention:   activity supervised   assistive device/personal items within reach  Taken 6/17/2024 2000 by Angeles Kendrick LPN  Safety Promotion/Fall Prevention:   activity supervised   assistive device/personal items within reach  Intervention: Prevent Skin Injury  Recent Flowsheet Documentation  Taken 6/18/2024 0026 by Angeles Kendrick LPN  Body Position: position changed independently  Skin Protection: adhesive use limited  Taken 6/17/2024 2000 by Angeles Kendrick LPN  Body Position: position changed independently  Skin Protection: adhesive use limited  Intervention: Prevent and Manage VTE (Venous Thromboembolism) Risk  Recent Flowsheet Documentation  Taken 6/18/2024 0026 by Angeles Kendrick LPN  Activity Management: activity encouraged  VTE Prevention/Management:   bilateral   patient refused intervention  Range of Motion: active ROM (range of motion) encouraged  Taken 6/17/2024 2000 by Angeles Kendrick LPN  Activity Management: activity encouraged  VTE Prevention/Management:   bilateral   sequential compression devices off   patient refused intervention  Intervention: Prevent Infection  Recent Flowsheet Documentation  Taken 6/18/2024 0026 by Angeles Kendrick LPN  Infection Prevention:   environmental surveillance performed   equipment surfaces disinfected  Taken 6/17/2024 2000 by Angeles Kendrick LPN  Infection Prevention:   environmental surveillance performed   equipment surfaces disinfected  Goal: Optimal Comfort and Wellbeing  Outcome: Ongoing, Progressing  Intervention: Provide Person-Centered Care  Recent  Flowsheet Documentation  Taken 6/18/2024 0026 by Angeles Kendrick LPN  Trust Relationship/Rapport:   care explained   choices provided   questions encouraged   reassurance provided   thoughts/feelings acknowledged  Goal: Readiness for Transition of Care  Outcome: Ongoing, Progressing     Problem: Skin Injury Risk Increased  Goal: Skin Health and Integrity  Outcome: Ongoing, Progressing  Intervention: Optimize Skin Protection  Recent Flowsheet Documentation  Taken 6/18/2024 0026 by Angeles Kendrick LPN  Pressure Reduction Techniques: frequent weight shift encouraged  Head of Bed (HOB) Positioning: HOB elevated  Pressure Reduction Devices: pressure-redistributing mattress utilized  Skin Protection: adhesive use limited  Taken 6/17/2024 2000 by Angeles Kendrick LPN  Pressure Reduction Techniques: frequent weight shift encouraged  Head of Bed (HOB) Positioning: HOB elevated  Pressure Reduction Devices: pressure-redistributing mattress utilized  Skin Protection: adhesive use limited     Problem: COPD (Chronic Obstructive Pulmonary Disease) Comorbidity  Goal: Maintenance of COPD Symptom Control  Outcome: Ongoing, Progressing  Intervention: Maintain COPD-Symptom Control  Recent Flowsheet Documentation  Taken 6/18/2024 0026 by Angeles Kendrick LPN  Medication Review/Management: medications reviewed  Taken 6/17/2024 2000 by Angeles Kendrick LPN  Medication Review/Management: medications reviewed     Problem: Diabetes Comorbidity  Goal: Blood Glucose Level Within Targeted Range  Outcome: Ongoing, Progressing     Problem: Hypertension Comorbidity  Goal: Blood Pressure in Desired Range  Outcome: Ongoing, Progressing  Intervention: Maintain Blood Pressure Management  Recent Flowsheet Documentation  Taken 6/18/2024 0026 by Angeles Kendrick LPN  Medication Review/Management: medications reviewed  Taken 6/17/2024 2000 by Angeles Kendrick LPN  Medication Review/Management: medications reviewed     Problem: Obstructive Sleep Apnea  Risk or Actual Comorbidity Management  Goal: Unobstructed Breathing During Sleep  Outcome: Ongoing, Progressing     Problem: Fall Injury Risk  Goal: Absence of Fall and Fall-Related Injury  Outcome: Ongoing, Progressing  Intervention: Identify and Manage Contributors  Recent Flowsheet Documentation  Taken 6/18/2024 0026 by Angeles Kendrick LPN  Medication Review/Management: medications reviewed  Taken 6/17/2024 2000 by Angeles Kendrick LPN  Medication Review/Management: medications reviewed  Intervention: Promote Injury-Free Environment  Recent Flowsheet Documentation  Taken 6/18/2024 0026 by Angeles Kendrick LPN  Safety Promotion/Fall Prevention:   activity supervised   assistive device/personal items within reach  Taken 6/17/2024 2000 by Angeles Kendrick LPN  Safety Promotion/Fall Prevention:   activity supervised   assistive device/personal items within reach     Problem: Noninvasive Ventilation Acute  Goal: Effective Unassisted Ventilation and Oxygenation  Outcome: Ongoing, Progressing   Goal Outcome Evaluation:

## 2024-06-18 NOTE — DISCHARGE SUMMARY
[facility] Medicine Services  Discharge Summary    Date of Service: 2024  Patient Name: Matthieu Hawk  : 1960  MRN: [id number]    Date of Admission: 2024  Discharge Diagnosis:     Probable metabolic encephalopathy  Chronic diastolic congestive heart failure  Degenerative arthritis  Spinal degenerative disease  Morbid obesity  Obstructive sleep apnea on NIPPV   Type 2 diabetes  Chronic pain syndrome  Diabetic neuropathy  Hypertension    Date of Discharge: 2024  Primary Care Physician: Provider, No Known      Presenting Problem:   TIA (transient ischemic attack) [G45.9]  Weakness [R53.1]  Renal insufficiency [N28.9]  Uncontrolled type 2 diabetes mellitus with hyperglycemia [E11.65]    Active and Resolved Hospital Problems:  Active Hospital Problems    Diagnosis POA    **TIA (transient ischemic attack) [G45.9] Yes      Resolved Hospital Problems   No resolved problems to display.         Hospital Course     HPI:  Per the H&P dated Sobeida 15, 2024:    Hospital Course:  This is a pleasant 63-year-old morbidly obese white male who presented with issues as discussed in history and physical.  He remained afebrile during his hospitalization.  Oxygenation was supplemented with nasal cannula as well as his nocturnal CPAP.  Blood pressure is well-controlled.  He had no microbiology data.  Initial CT scan of the head showed no acute process.  Cerebral flow scan showed no abnormalities.  CT angiogram showed no vascular abnormalities.  Subsequent MRI brain showed no abnormalities.  Chest x-ray showed no acute process.  He had some noted renal insufficiency of the transient nature and had ultrasound kidneys performed showed normal-appearing kidneys.  Echocardiogram was performed that showed a preserved ejection fraction with some moderate diastolic dysfunction with ongoing back and hip pain he had lumbar and thoracic spine MRI performed that showed degenerative changes without  significant canal stenosis.  He was seen in evaluation by neurology..  Patient states that he has attempted pain clinic in the past with injections without improvement.  He subsequently had a CT scanning of pelvis performed that showed severe degenerative changes of the hips bilaterally.  It appears as if his issues are more chronic in nature.  It was thought that he would benefit from discharge to rehab setting prior to any attempts at returning home with his wife.  Thiazide diuretic is discontinued at time of discharge and he should follow-up with his creatinine with caution as any anti-inflammatory use.  Otherwise continue his nocturnal positive pressure ventilation.            Reasons For Change In Medications and Indications for New Medications:      Day of Discharge     Vital Signs:  Temp:  [98.4 °F (36.9 °C)-98.7 °F (37.1 °C)] 98.6 °F (37 °C)  Heart Rate:  [72-86] 78  Resp:  [13-20] 14  BP: ()/(47-72) 114/65  Flow (L/min):  [3] 3    Physical Exam:  Physical Exam  Vitals reviewed.   HENT:      Head: Normocephalic.   Cardiovascular:      Rate and Rhythm: Normal rate and regular rhythm.   Pulmonary:      Effort: Pulmonary effort is normal.      Breath sounds: Normal breath sounds.   Abdominal:      Palpations: Abdomen is soft.      Tenderness: There is no abdominal tenderness.   Musculoskeletal:         General: No swelling.   Neurological:      Mental Status: He is alert. Mental status is at baseline.   Psychiatric:         Behavior: Behavior normal.            Pertinent  and/or Most Recent Results     LAB RESULTS:      Lab 06/18/24  0228 06/17/24  0431 06/16/24  0124 06/15/24  0340 06/14/24  2138   WBC 10.57 9.70 8.74 10.80 11.02*   HEMOGLOBIN 12.4* 12.3* 12.1* 12.7* 12.7*   HEMATOCRIT 39.2 38.6 38.8 40.4 39.5   PLATELETS 361 340 411 416 403   NEUTROS ABS 5.96 5.80 6.14 8.03* 7.08*   IMMATURE GRANS (ABS) 0.04 0.04 0.03 0.03 0.04   LYMPHS ABS 3.31* 2.94 1.78 1.79 2.77   MONOS ABS 0.96* 0.75 0.74 0.78  0.91*   EOS ABS 0.23 0.14 0.02 0.11 0.16   MCV 89.7 88.1 88.4 88.4 88.4   SED RATE  --   --   --  37*  --    PROTIME  --   --   --   --  10.1   APTT  --   --   --   --  27.5         Lab 06/18/24  0228 06/17/24  0431 06/16/24  0124 06/15/24  0340 06/14/24  2138   SODIUM 136 133* 134* 134* 134*   POTASSIUM 4.3 4.5 4.5 4.9 4.5   CHLORIDE 95* 94* 95* 94* 92*   CO2 30.6* 28.2 30.8* 28.3 29.8*   ANION GAP 10.4 10.8 8.2 11.7 12.2   BUN 29* 20 25* 32* 30*   CREATININE 1.63* 1.09 1.14 1.76* 2.10*   EGFR 46.8* 75.8 71.8 42.6* 34.5*   GLUCOSE 78 239* 270* 322* 339*   CALCIUM 9.5 9.8 9.5 9.3 9.4   MAGNESIUM  --   --   --  2.2  --    PHOSPHORUS  --   --   --  4.9*  --    HEMOGLOBIN A1C  --   --   --  9.68*  --    TSH  --   --   --  0.941  --          Lab 06/17/24  0431 06/15/24  0340 06/14/24  2138   TOTAL PROTEIN 7.4 7.3 7.3   ALBUMIN 4.1 4.1 4.2   GLOBULIN 3.3 3.2 3.1   ALT (SGPT) 7 8 9   AST (SGOT) 17 17 14   BILIRUBIN 0.3 0.2 0.2   ALK PHOS 85 91 96         Lab 06/15/24  1124 06/15/24  0340 06/14/24  2138   HSTROP T 20 26* 27*   PROTIME  --   --  10.1   INR  --   --  <0.93*         Lab 06/15/24  0340   CHOLESTEROL 127   LDL CHOL 47   HDL CHOL 40   TRIGLYCERIDES 257*         Lab 06/15/24  0340 06/14/24  2158   FOLATE 3.50*  --    VITAMIN B 12 501  --    ABO TYPING  --  O   RH TYPING  --  Positive   ANTIBODY SCREEN  --  Negative         Brief Urine Lab Results       None          Microbiology Results (last 10 days)       ** No results found for the last 240 hours. **            CT Pelvis Without Contrast    Result Date: 6/17/2024  Impression: Impression: No acute abnormality of the pelvis. Moderate to severe bilateral hip arthritis. Nonspecific focal cutaneous thickening and subcutaneous edema along the right anterior pelvic wall without well-defined fluid collection. Electronically Signed: Scotty Prado MD  6/17/2024 9:17 PM EDT  Workstation ID: [id number]    MRI Lumbar Spine With & Without Contrast    Result Date:  6/15/2024  Impression: Impression: Contrast-enhanced MRI of the thoracic spine demonstrates no evidence of acute osseous findings or significant spinal canal or neuroforaminal impingement. There is no definite intramedullary cord signal abnormality or abnormal enhancement. The lumbar spine demonstrates some mild spondylosis changes and superimposed epidural lipomatosis which results in some areas of moderate spinal canal and neuroforaminal narrowing. There is no abnormal enhancement. Electronically Signed: Omar Merritt MD  6/15/2024 4:20 PM EDT  Workstation ID: [id number]    MRI Thoracic Spine With & Without Contrast    Result Date: 6/15/2024  Impression: Impression: Contrast-enhanced MRI of the thoracic spine demonstrates no evidence of acute osseous findings or significant spinal canal or neuroforaminal impingement. There is no definite intramedullary cord signal abnormality or abnormal enhancement. The lumbar spine demonstrates some mild spondylosis changes and superimposed epidural lipomatosis which results in some areas of moderate spinal canal and neuroforaminal narrowing. There is no abnormal enhancement. Electronically Signed: Omar Merritt MD  6/15/2024 4:20 PM EDT  Workstation ID: [id number]    US Renal Bilateral    Result Date: 6/15/2024  Impression: Impression: Normal ultrasound appearance of the kidneys with no obstructive uropathy Electronically Signed: José Miguel Muse  6/15/2024 7:29 AM EDT  Workstation ID: [id number]    MRI Brain Without Contrast    Result Date: 6/14/2024  Impression: Impression: 1. No acute intracranial abnormality. 2. Minimal chronic small vessel ischemic change. Electronically Signed: Scotty Rosario MD  6/14/2024 11:49 PM EDT  Workstation ID: [id number]    XR Chest 1 View    Result Date: 6/14/2024  Impression: Impression: No acute cardiopulmonary disease. Electronically Signed: Zenon Rodriguez MD  6/14/2024 10:31 PM EDT  Workstation ID: [id number]    CT Angiogram Head w AI Analysis of  LVO    Result Date: 6/14/2024  Impression: 1.No hemodynamically significant stenosis, large vessel occlusion or aneurysms in intracranial circulation 2.No hemodynamically significant stenosis, dissection or aneurysms in extracranial circulation. Electronically Signed: Ben Jay MD  6/14/2024 10:03 PM EDT  Workstation ID: [id number]    CT Angiogram Neck    Result Date: 6/14/2024  Impression: 1.No hemodynamically significant stenosis, large vessel occlusion or aneurysms in intracranial circulation 2.No hemodynamically significant stenosis, dissection or aneurysms in extracranial circulation. Electronically Signed: Ben Jay MD  6/14/2024 10:03 PM EDT  Workstation ID: [id number]    CT CEREBRAL PERFUSION WITH & WITHOUT CONTRAST    Result Date: 6/14/2024  Impression: Negative for completed infarct or ischemic penumbra. Electronically Signed: Ben Jay MD  6/14/2024 9:55 PM EDT  Workstation ID: [id number]    CT Head Without Contrast Stroke Protocol    Result Date: 6/14/2024  Impression: Impression: Negative for acute intracranial hemorrhage, large territory infarct, midline shift or hydrocephalus. Electronically Signed: Ben Jay MD  6/14/2024 9:27 PM EDT  Workstation ID: [id number]     Results for orders placed during the hospital encounter of 06/14/24    Bilateral Carotid Duplex    Interpretation Summary    Right internal carotid artery demonstrates normal flow without evidence of hemodynamically significant stenosis.    Left internal carotid artery demonstrates normal flow without evidence of hemodynamically significant stenosis.      Results for orders placed during the hospital encounter of 06/14/24    Bilateral Carotid Duplex    Interpretation Summary    Right internal carotid artery demonstrates normal flow without evidence of hemodynamically significant stenosis.    Left internal carotid artery demonstrates normal flow without evidence of hemodynamically significant stenosis.      Results for  orders placed during the hospital encounter of 06/14/24    Adult Transthoracic Echo Complete W/ Cont if Necessary Per Protocol (With Agitated Saline)    Interpretation Summary    Left ventricular systolic function is normal. Left ventricular ejection fraction appears to be 66 - 70%.    Left ventricular diastolic function is consistent with (grade I) impaired relaxation.    The left atrial cavity is mildly dilated.    The study is technically difficult for diagnosis. The quality of the study is limited due to patient body habituswith poor acoustic windows      Labs Pending at Discharge:      Procedures Performed           Consults:   Consults       Date and Time Order Name Status Description    6/14/2024 11:28 PM Hospitalist (on-call MD unless specified)      6/14/2024  9:37 PM Inpatient Neurology Consult Stroke      6/14/2024  9:15 PM Inpatient Neurology Consult Stroke      6/14/2024  9:15 PM Inpatient Neurology Consult Stroke                Discharge Details        Discharge Medications        ASK your doctor about these medications        Instructions Start Date   atorvastatin 20 MG tablet  Commonly known as: LIPITOR   20 mg, Oral, Daily      baclofen 10 MG tablet  Commonly known as: LIORESAL   10 mg, Oral, 3 Times Daily PRN      carvedilol 12.5 MG tablet  Commonly known as: COREG   12.5 mg, Oral, 2 Times Daily With Meals      Dulaglutide 3 MG/0.5ML solution pen-injector   3 mg, Subcutaneous, Weekly, Saturdays       DULoxetine 60 MG capsule  Commonly known as: CYMBALTA   60 mg, Oral, 2 Times Daily      finasteride 5 MG tablet  Commonly known as: PROSCAR   5 mg, Oral, Daily      glipizide 5 MG tablet  Commonly known as: GLUCOTROL   5 mg, Oral, 2 Times Daily Before Meals      HYDROcodone-acetaminophen 7.5-325 MG per tablet  Commonly known as: NORCO   1 tablet, Oral, Every 6 Hours PRN      insulin NPH-insulin regular (70-30) 100 UNIT/ML injection  Commonly known as: humuLIN 70/30,novoLIN 70/30   100 Units,  Subcutaneous, Every Morning      insulin NPH-insulin regular (70-30) 100 UNIT/ML injection  Commonly known as: humuLIN 70/30,novoLIN 70/30   75 Units, Subcutaneous, Nightly      losartan-hydrochlorothiazide 100-12.5 MG per tablet  Commonly known as: HYZAAR   1 tablet, Oral, Daily      omeprazole 40 MG capsule  Commonly known as: priLOSEC   40 mg, Oral, 2 Times Daily      pregabalin 75 MG capsule  Commonly known as: LYRICA   75 mg, Oral, 2 Times Daily      risperiDONE 1 MG disintegrating tablet  Commonly known as: risperDAL M-TABS   1 mg, Translingual, Daily      traZODone 100 MG tablet  Commonly known as: DESYREL   200 mg, Oral, Nightly               Allergies   Allergen Reactions    Codeine Itching and Rash         Discharge Disposition: Rehab      Diet:  Hospital:  Diet Order   Procedures    Diet: Cardiac, Diabetic; Healthy Heart (2-3 Na+); Consistent Carbohydrate; Fluid Consistency: Thin (IDDSI 0)         Discharge Activity:         CODE STATUS:  Code Status and Medical Interventions:   Ordered at: 06/15/24 0611     Level Of Support Discussed With:    Patient     Code Status (Patient has no pulse and is not breathing):    CPR (Attempt to Resuscitate)     Medical Interventions (Patient has pulse or is breathing):    Full Support         No future appointments.        Time spent on Discharge including face to face service:  >30 minutes    Signature: Electronically signed by Timothy Duane Brammell, MD, 06/18/24, 09:54 EDT.  [facility] Hospitalist Team

## 2024-06-19 NOTE — CASE MANAGEMENT/SOCIAL WORK
Case Management Discharge Note      Final Note: Tarun Villanueva    Provided Post Acute Provider List?: Refused    Selected Continued Care - Discharged on 6/18/2024 Admission date: 6/14/2024 - Discharge disposition: Rehab Facility or Unit (DC - External)      Destination Coordination complete.      Service Provider Selected Services Address Phone Fax Patient Preferred    [facility] [address] [place] [address] [phone] --                 Transportation Services  W/C Van: Skilled Nursing Facility Van    Final Discharge Disposition Code: 03 - skilled nursing facility (SNF)

## 2024-07-01 ENCOUNTER — DOCUMENTATION (OUTPATIENT)
Dept: HOME HEALTH SERVICES | Facility: HOME HEALTHCARE | Age: 64
End: 2024-07-01
Payer: MEDICARE

## 2024-07-01 NOTE — PROGRESS NOTES
Matthieu Hawk - [birth year]  DC 6/29 - Yannick  RN to admit on 7/4    [facility], NP is the ordering provider: RN to admit on 7/4    [facility], NP performed the F2F on 6/21/24    DX: metabolic encephalopathy, hypo-osmolality and hyponatremia, TIA, CHF.     Dr. Siddharth Barr is the PCP/POC/Attending provider and agrees to follow the HH/POC per Naomie on 6/28/24 at 15:16    Address confirmed:  [address] [place] [address]  [address]   [facility] [place] [facility]    Contact:  #1 Sharri - Spouse - [phone]    I spoke with Sharri and she is agreeable to home health and they do not have any other skilled services in the home at this time.

## 2024-07-03 ENCOUNTER — HOME HEALTH ADMISSION (OUTPATIENT)
Dept: HOME HEALTH SERVICES | Facility: HOME HEALTHCARE | Age: 64
End: 2024-07-03
Payer: COMMERCIAL

## 2024-07-03 ENCOUNTER — DOCUMENTATION (OUTPATIENT)
Dept: HOME HEALTH SERVICES | Facility: HOME HEALTHCARE | Age: 64
End: 2024-07-03
Payer: MEDICARE

## 2024-07-03 ENCOUNTER — TRANSCRIBE ORDERS (OUTPATIENT)
Dept: HOME HEALTH SERVICES | Facility: HOME HEALTHCARE | Age: 64
End: 2024-07-03
Payer: MEDICARE

## 2024-07-03 DIAGNOSIS — M54.16 LUMBAR RADICULOPATHY: Primary | ICD-10-CM

## 2024-07-03 NOTE — PROGRESS NOTES
Matthieu Hawk - [birth year]  DC 6/29 - [facility]  RN to admit on 7/4    Martin Pineda, KEMI is the ordering provider: RN to admit on 7/4    Dr. Siddharth Barr (PCP) performed the F2F in office visit on 6/12/24    DX: M54.16 - (Per Kenisha Alberto as the primary diagnosis from office visit)    Dr. Siddharth Barr is the PCP/POC/Attending provider and agrees to follow the HH/POC per Kyleigh on 7/3/24 at 08:01    Address confirmed:  [address] [facility] [address] [facility]  [address]  [place] [place] [address]    Contact:  #1 Sharri - Spouse - [phone]    I spoke with Sharri and she is agreeable to home health and they do not have any other skilled services in the home at this time.

## 2024-07-04 ENCOUNTER — HOME CARE VISIT (OUTPATIENT)
Dept: HOME HEALTH SERVICES | Facility: HOME HEALTHCARE | Age: 64
End: 2024-07-04
Payer: COMMERCIAL

## 2024-07-04 VITALS
TEMPERATURE: 97 F | OXYGEN SATURATION: 97 % | RESPIRATION RATE: 19 BRPM | HEIGHT: 70 IN | HEART RATE: 100 BPM | BODY MASS INDEX: 53.09 KG/M2

## 2024-07-04 PROCEDURE — G0299 HHS/HOSPICE OF RN EA 15 MIN: HCPCS

## 2024-07-04 NOTE — Clinical Note
Drug-Drug  <jscript:void(0)>  Drug-Drug: traZODone and DULoxetine   Serotonergic effects of trazodone and serotonin/norepinephrine reuptake inhibitors (SNRIs) may be additive. The risk of serotonin syndrome/toxicity may be increased.   Details Major   traZODone (DESYREL) 100 MG tablet   Long-term.     DULoxetine (CYMBALTA) 60 MG capsule

## 2024-07-04 NOTE — HOME HEALTH
"SOC Note: pt. with recent discharge from [facility] for rehabilitation stay d/t a fall he had at home d/t weakness and was sent to [facility] for TIA. Pt. lives in apartment wtih wife, who is primary caregiver and assists in care.  Pt. states he has chronic back pain, long hx. of DM, HTN, BPH, hx. of TIA.  Pt. needs lots of education on Diabetes, states he has not been taking his insulin since he has been home or even monitoring blood glucose.     Home Health ordered for: disciplines SN, PT, OT    Reason for Hosp/Primary Dx/Co-morbidities: TIA, DM, HTN, BPH    Focus of Care: Radiculopathy    Patient's goal(s): \" To have more energy and get back to normal\"    Current Functional status/mobility/DME: walker, wheelchair, glucometer and supplies, shower chair, CPAP machine    HB status/Living Arrangements: homebound, lives with wife    Skin Integrity/wound status: at risk, WNL at this time    Code Status: Full code    Fall Risk/Safety concerns: at risk    Educated on Emergency Plan, steps to take prior to going to the ER and when to Call [facility]:  pt. and wife aware     Medication issues/Concerns: compliance of medications, Diabetes management    Additional Problems/Concerns: education on co-morbidities    SDOH Barriers (i.e. caregiver concerns, social isolation, transportation, food insecurity, environment, income etc.)/Need for MSW: not interested at this time    [id number]"

## 2024-07-06 NOTE — PROGRESS NOTES
Enter Query Response Below      Query Response: TIA ruled out             If applicable, please update the problem list.     Patient: Matthieu Hawk        : 1960  Account: [id number]           Admit Date: 2024        How to Respond to this query:       a. Click New Note     b. Answer query within the yellow box.                c. Update the Problem List, if applicable.      If you have any questions about this query contact me at: [email or web address]     :    63 year old male admitted with Bilateral LE weakness, LLE numbness, decreased alertness, slurred speech.   History of diabetes and diabetic neuropathy.  Clinical indicators: H&P review of systems-Positive for speech difficulty and numbness. Negative for dizziness and facial asymmetry.   H&P assessment: Possible TIA, Stroke rule out, worsening diabetic neuropathy.  6/15 Neurology-1.Subacute left leg weakness/numbness, back pain, falls.   No stroke on MRI brain. Likely combination of deconditioning and pain.  - CT head negative for hemorrhage.  - MRI brain: reviewed- no acute or subacute stroke.   - CT Angiogram head and neck: No significant stenosis, occlusion, aneurysm or dissection.  Discharge summary Discharge diagnosis: Probable metabolic encephalopathy. Active and Resolved Hospital Problem list includes TIA present on admit.  Treatments/monitoring: neuro checks, plavix, lipitor, physical therapy evaluation, occupational therapy evaluation.    Please clarify the following:    TIA ruled in  TIA ruled out  Other- specify______  Unable to determine      By submitting this query, we are merely seeking further clarification of documentation to accurately reflect all conditions that you are monitoring, evaluating, treating or that extend the hospitalization or utilize additional resources of care. Please utilize your independent clinical judgment when addressing the question(s) above.     This query and your response, once completed, will  be entered into the legal medical record.    Sincerely,  Danelle Rojas RN  Clinical Documentation Integrity Program

## 2024-07-08 ENCOUNTER — HOME CARE VISIT (OUTPATIENT)
Dept: HOME HEALTH SERVICES | Facility: HOME HEALTHCARE | Age: 64
End: 2024-07-08
Payer: COMMERCIAL

## 2024-07-08 VITALS
SYSTOLIC BLOOD PRESSURE: 145 MMHG | HEART RATE: 102 BPM | DIASTOLIC BLOOD PRESSURE: 70 MMHG | TEMPERATURE: 98 F | OXYGEN SATURATION: 96 %

## 2024-07-08 PROCEDURE — G0151 HHCP-SERV OF PT,EA 15 MIN: HCPCS

## 2024-07-08 NOTE — HOME HEALTH
PT EVAL 1w1,2w3 mon/wed  Patient is a 63 year old  male,referred for skilled PT interventions after recent hospital stay due to TIAs and frequent falls. Patient presented with weakness B LE,  Min A for transfers, Min A for gait with the use of a RW.  SOCIAL SUPPORT/HOME LIVING SITUATION. Lives with spouse, ambulates with a RW, uses his Wc for mobility    PLOF. Px reports being independent without an AD prior to hospitalization  Vital Signs. Please see oasis/eval on this visit  Homebound status. Due to dec act tolerance, weakness, decline in transfers and ambulation. Patient requires a taxing effort to leave home, putting patient at risk for falls or injury  Skilled PT needed to improve patient's functional mobility, improve safety for transfers and ambulation and return patient to PLOF.    Plan on next visit: HEP teaching, gait training

## 2024-07-09 ENCOUNTER — HOME CARE VISIT (OUTPATIENT)
Dept: HOME HEALTH SERVICES | Facility: HOME HEALTHCARE | Age: 64
End: 2024-07-09
Payer: COMMERCIAL

## 2024-07-10 ENCOUNTER — HOME CARE VISIT (OUTPATIENT)
Dept: HOME HEALTH SERVICES | Facility: HOME HEALTHCARE | Age: 64
End: 2024-07-10
Payer: MEDICARE

## 2024-07-10 VITALS
OXYGEN SATURATION: 95 % | RESPIRATION RATE: 15 BRPM | TEMPERATURE: 97.8 F | DIASTOLIC BLOOD PRESSURE: 70 MMHG | SYSTOLIC BLOOD PRESSURE: 134 MMHG

## 2024-07-10 PROCEDURE — G0157 HHC PT ASSISTANT EA 15: HCPCS

## 2024-07-11 ENCOUNTER — HOME CARE VISIT (OUTPATIENT)
Dept: HOME HEALTH SERVICES | Facility: HOME HEALTHCARE | Age: 64
End: 2024-07-11
Payer: COMMERCIAL

## 2024-07-11 VITALS
HEART RATE: 87 BPM | TEMPERATURE: 98.7 F | OXYGEN SATURATION: 91 % | DIASTOLIC BLOOD PRESSURE: 90 MMHG | SYSTOLIC BLOOD PRESSURE: 140 MMHG

## 2024-07-11 PROCEDURE — G0299 HHS/HOSPICE OF RN EA 15 MIN: HCPCS

## 2024-07-11 PROCEDURE — G0152 HHCP-SERV OF OT,EA 15 MIN: HCPCS

## 2024-07-11 NOTE — HOME HEALTH
pt sitting up on the couch on arrival-  reports he has been been attempting hep review but admits needing instruction and cues to perform correctly.      pt states his endurance and strength remain low and he very much wants to improve.   no falls and no med changes reported.     pt was very limited today,  with limited standing and gait tolerance and often quickly needed to sit due to weakness.  pt was challenged to stand beyond 1 mins with c/o LE weakness.   pt was advised to have sba of family with standing activities including all gait trg and hep review for safety.  pt was very fatigued to end PT session but was pleased to have participated       plan for next session-  cont PT with close assistance when standing due to low endurance and limited standing tolerance.

## 2024-07-12 NOTE — HOME HEALTH
Pt informed no same day appts available & to try [facility].   Pt is a 62 yo male who lives in a first floor apt. with spouse.   Pt recently hospitalized secondary to       PLOF pt independent with feeding grooming and toileting.   Pt required MIN assist with bathing and dressing.   Total assist with all IADLs      Currently  pt independent with feeding grooming and toileting.   Pt required MOD assist with bathing and dressing.   Total assist with all IADLs      Skilled OT for ther ex and adl training.   Pt and therapist in agreement with goals and poc.      Next visit ther ex and adl training      Pt denies any falls or med changes

## 2024-07-13 VITALS
DIASTOLIC BLOOD PRESSURE: 70 MMHG | SYSTOLIC BLOOD PRESSURE: 138 MMHG | RESPIRATION RATE: 16 BRPM | OXYGEN SATURATION: 97 % | TEMPERATURE: 97.3 F | HEART RATE: 67 BPM

## 2024-07-15 ENCOUNTER — HOME CARE VISIT (OUTPATIENT)
Dept: HOME HEALTH SERVICES | Facility: HOME HEALTHCARE | Age: 64
End: 2024-07-15
Payer: MEDICARE

## 2024-07-15 VITALS
DIASTOLIC BLOOD PRESSURE: 70 MMHG | HEART RATE: 90 BPM | OXYGEN SATURATION: 91 % | TEMPERATURE: 98 F | SYSTOLIC BLOOD PRESSURE: 130 MMHG

## 2024-07-15 PROCEDURE — G0151 HHCP-SERV OF PT,EA 15 MIN: HCPCS

## 2024-07-17 ENCOUNTER — HOME CARE VISIT (OUTPATIENT)
Dept: HOME HEALTH SERVICES | Facility: HOME HEALTHCARE | Age: 64
End: 2024-07-17
Payer: MEDICARE

## 2024-07-18 ENCOUNTER — HOME CARE VISIT (OUTPATIENT)
Dept: HOME HEALTH SERVICES | Facility: HOME HEALTHCARE | Age: 64
End: 2024-07-18
Payer: COMMERCIAL

## 2024-07-18 PROCEDURE — G0299 HHS/HOSPICE OF RN EA 15 MIN: HCPCS

## 2024-07-19 ENCOUNTER — HOME CARE VISIT (OUTPATIENT)
Dept: HOME HEALTH SERVICES | Facility: HOME HEALTHCARE | Age: 64
End: 2024-07-19
Payer: COMMERCIAL

## 2024-07-19 VITALS
TEMPERATURE: 97 F | OXYGEN SATURATION: 99 % | SYSTOLIC BLOOD PRESSURE: 150 MMHG | HEART RATE: 71 BPM | RESPIRATION RATE: 17 BRPM | DIASTOLIC BLOOD PRESSURE: 70 MMHG

## 2024-07-19 PROCEDURE — G0152 HHCP-SERV OF OT,EA 15 MIN: HCPCS

## 2024-07-20 NOTE — PROGRESS NOTES
Enter Query Response Below      Query Response: Bilateral lower extremity weakness and numbness due to diabetic neuropathy             If applicable, please update the problem list.       Patient: Matthieu Hawk        : 1960  Account: [id number]           Admit Date: 2024        How to Respond to this query:       a. Click New Note     b. Answer query within the yellow box.                c. Update the Problem List, if applicable.      If you have any questions about this query contact me at:  [email or web address]    Dr. Burgos    Thank you for your response to the CDI query.   When responding to queries sent by CDI, the preferred method is to respond with a New Note. By responding with a new note, the note will include your query response and signature.  How to Respond to this query:  a. Click New Note  b. Answer query within the yellow box.  c. Update the Problem List, if applicable.    From: Brammell, Timothy Duane, MD  Sent: 2024   6:44 PM EDT  To: Danelle Rojas  Subject: RE: CDI Query                                        Lateral lower extremity weakness due to diabetic neuropathy     ----- Message -----  From: Danelle Rojas  Sent: 2024   3:53 PM EDT  To: Timothy Duane Brammell, MD  Subject: CDI Query                                            Patient: Matthieu Hawk        : 1960  Account: [id number]           Admit Date: 2024                                                    How to Respond to this query:                   a. Click New Note                b. Answer query within the yellow box.                c. Update the Problem List, if applicable.        If you have any questions about this query contact me at: [email or web address]      :     63 year old male admitted with Bilateral LE weakness, LLE numbness, decreased alertness, slurred speech.   History of diabetes and diabetic neuropathy.  Clinical indicators: H&P review of systems-Positive for  speech difficulty and numbness. Negative for dizziness and facial asymmetry.   H&P assessment: Worsening diabetic neuropathy.  6/15 Neurology-1.Subacute left leg weakness/numbness, back pain, falls. No stroke on MRI brain. Likely combination of deconditioning and pain.  6/18 Query response: TIA ruled out.  Discharge Summary includes diabetic neuropathy.  Treatments/monitoring: neuro checks, insulin, plavix, lipitor, physical therapy evaluation, occupational therapy evaluation.     Please clarify the following:     Bilateral LE weakness, LLE numbness due to diabetic neuropathy  Bilateral LE weakness, LLE numbness due to (please specify) ______________  Other _____________  Unable to determine     By submitting this query, we are merely seeking further clarification of documentation to accurately reflect all conditions that you are monitoring, evaluating, treating or that extend the hospitalization or utilize additional resources of care. Please utilize your independent clinical judgment when addressing the question(s) above.      This query and your response, once completed, will be entered into the legal medical record.     Sincerely,  Danelle Rojas RN  Clinical Documentation Integrity Program     Esig query sent 7/16 by:  Kyleigh CHAVEZ RN, CCDS  Clinical Documentation Integrity Program   [email or web address]

## 2024-07-21 VITALS
DIASTOLIC BLOOD PRESSURE: 60 MMHG | OXYGEN SATURATION: 93 % | HEART RATE: 90 BPM | SYSTOLIC BLOOD PRESSURE: 138 MMHG | TEMPERATURE: 97.5 F

## 2024-07-22 ENCOUNTER — HOME CARE VISIT (OUTPATIENT)
Dept: HOME HEALTH SERVICES | Facility: HOME HEALTHCARE | Age: 64
End: 2024-07-22
Payer: COMMERCIAL

## 2024-07-22 VITALS
OXYGEN SATURATION: 92 % | TEMPERATURE: 98 F | HEART RATE: 98 BPM | DIASTOLIC BLOOD PRESSURE: 80 MMHG | SYSTOLIC BLOOD PRESSURE: 130 MMHG

## 2024-07-22 PROCEDURE — G0151 HHCP-SERV OF PT,EA 15 MIN: HCPCS

## 2024-07-23 ENCOUNTER — HOME CARE VISIT (OUTPATIENT)
Dept: HOME HEALTH SERVICES | Facility: HOME HEALTHCARE | Age: 64
End: 2024-07-23
Payer: MEDICARE

## 2024-07-23 VITALS
TEMPERATURE: 96.9 F | OXYGEN SATURATION: 97 % | DIASTOLIC BLOOD PRESSURE: 88 MMHG | SYSTOLIC BLOOD PRESSURE: 138 MMHG | HEART RATE: 92 BPM

## 2024-07-23 PROCEDURE — G0152 HHCP-SERV OF OT,EA 15 MIN: HCPCS

## 2024-07-25 ENCOUNTER — HOME CARE VISIT (OUTPATIENT)
Dept: HOME HEALTH SERVICES | Facility: HOME HEALTHCARE | Age: 64
End: 2024-07-25
Payer: COMMERCIAL

## 2024-07-25 VITALS
RESPIRATION RATE: 17 BRPM | DIASTOLIC BLOOD PRESSURE: 60 MMHG | HEART RATE: 78 BPM | TEMPERATURE: 98.3 F | OXYGEN SATURATION: 96 % | SYSTOLIC BLOOD PRESSURE: 134 MMHG

## 2024-07-25 PROCEDURE — G0157 HHC PT ASSISTANT EA 15: HCPCS

## 2024-07-25 PROCEDURE — G0299 HHS/HOSPICE OF RN EA 15 MIN: HCPCS

## 2024-07-26 VITALS
SYSTOLIC BLOOD PRESSURE: 138 MMHG | HEART RATE: 74 BPM | DIASTOLIC BLOOD PRESSURE: 64 MMHG | RESPIRATION RATE: 17 BRPM | OXYGEN SATURATION: 98 % | TEMPERATURE: 97.5 F

## 2024-07-26 NOTE — HOME HEALTH
pt sitting up and is prepared for PT session,  with no new c/o's and is motivated to improve and return to plf.    pt states he is slowly improving but is concerned regarding ongoing limitations.       pt was compliant throughout PT session but struggled at times with challenges to stand beyond 1 minute and with requests to sit.  pt was encouraged to properly pace activities and needed instruction to perform ther ex correctly with proper deep plb  for energy conservation   pt displayed weakness in the LEs,evident during hep review and gait trg.            plan for next session-  cont PT with PRE's,  gait trg,   balance trg and transfer education

## 2024-07-30 ENCOUNTER — HOME CARE VISIT (OUTPATIENT)
Dept: HOME HEALTH SERVICES | Facility: HOME HEALTHCARE | Age: 64
End: 2024-07-30
Payer: COMMERCIAL

## 2024-07-30 VITALS
DIASTOLIC BLOOD PRESSURE: 64 MMHG | RESPIRATION RATE: 16 BRPM | HEART RATE: 82 BPM | TEMPERATURE: 97.7 F | OXYGEN SATURATION: 95 % | SYSTOLIC BLOOD PRESSURE: 126 MMHG

## 2024-07-30 PROCEDURE — G0157 HHC PT ASSISTANT EA 15: HCPCS

## 2024-07-31 ENCOUNTER — HOME CARE VISIT (OUTPATIENT)
Dept: HOME HEALTH SERVICES | Facility: HOME HEALTHCARE | Age: 64
End: 2024-07-31
Payer: MEDICARE

## 2024-07-31 ENCOUNTER — HOME CARE VISIT (OUTPATIENT)
Dept: HOME HEALTH SERVICES | Facility: HOME HEALTHCARE | Age: 64
End: 2024-07-31
Payer: COMMERCIAL

## 2024-07-31 VITALS
OXYGEN SATURATION: 95 % | HEART RATE: 98 BPM | SYSTOLIC BLOOD PRESSURE: 110 MMHG | DIASTOLIC BLOOD PRESSURE: 70 MMHG | TEMPERATURE: 98 F

## 2024-07-31 PROCEDURE — G0151 HHCP-SERV OF PT,EA 15 MIN: HCPCS

## 2024-07-31 NOTE — HOME HEALTH
pt sitting up and is prepared for PT session-   states he has been attempting hep review but with limited strength and has been nauseated.  pt canceled PT yesterday due to this.     pt with wb nearby and reports he is now using this at times due to increased weakness.     no med changes or complications     pt was limited today in all areas,   requested sitting due to weakness and low endurance.  pt was able to stand up to 1 minute prior to sitting.   pt was minimally unsteady upon standing but corrected with the walker use.   pt was educated today on proper lumbar stab techniques with hep review and to improve rom as able.   pt was cued to properly deep plb for energy conservation   pt reported he was exhausted to end PT session,  terminated session     plan for next session-  cont PT with gait trg, hep review, balance trg and transfer trg-  assess progress to goals and cont/dc PT as needed

## 2024-08-01 ENCOUNTER — HOME CARE VISIT (OUTPATIENT)
Dept: HOME HEALTH SERVICES | Facility: HOME HEALTHCARE | Age: 64
End: 2024-08-01
Payer: COMMERCIAL

## 2024-08-01 VITALS
HEART RATE: 102 BPM | RESPIRATION RATE: 17 BRPM | DIASTOLIC BLOOD PRESSURE: 60 MMHG | SYSTOLIC BLOOD PRESSURE: 110 MMHG | TEMPERATURE: 98 F | OXYGEN SATURATION: 98 %

## 2024-08-01 PROCEDURE — G0299 HHS/HOSPICE OF RN EA 15 MIN: HCPCS

## 2024-08-02 ENCOUNTER — HOME CARE VISIT (OUTPATIENT)
Dept: HOME HEALTH SERVICES | Facility: HOME HEALTHCARE | Age: 64
End: 2024-08-02
Payer: COMMERCIAL

## 2024-08-02 PROCEDURE — G0152 HHCP-SERV OF OT,EA 15 MIN: HCPCS

## 2024-08-04 VITALS
HEART RATE: 94 BPM | TEMPERATURE: 98.1 F | SYSTOLIC BLOOD PRESSURE: 138 MMHG | OXYGEN SATURATION: 95 % | DIASTOLIC BLOOD PRESSURE: 70 MMHG

## 2024-08-05 NOTE — HOME HEALTH
Pt requested OT dc this date as pt has met goals.   Pt to continue with HEP for maintenance.      Pt denies any falls or med changes

## 2024-08-08 ENCOUNTER — HOME CARE VISIT (OUTPATIENT)
Dept: HOME HEALTH SERVICES | Facility: HOME HEALTHCARE | Age: 64
End: 2024-08-08
Payer: MEDICARE

## 2024-08-08 PROCEDURE — G0299 HHS/HOSPICE OF RN EA 15 MIN: HCPCS

## 2024-08-09 ENCOUNTER — OFFICE VISIT (OUTPATIENT)
Dept: FAMILY MEDICINE CLINIC | Facility: CLINIC | Age: 64
End: 2024-08-09
Payer: MEDICARE

## 2024-08-09 ENCOUNTER — LAB (OUTPATIENT)
Dept: FAMILY MEDICINE CLINIC | Facility: CLINIC | Age: 64
End: 2024-08-09
Payer: MEDICARE

## 2024-08-09 VITALS
TEMPERATURE: 97 F | SYSTOLIC BLOOD PRESSURE: 122 MMHG | RESPIRATION RATE: 17 BRPM | HEART RATE: 71 BPM | OXYGEN SATURATION: 97 % | DIASTOLIC BLOOD PRESSURE: 60 MMHG

## 2024-08-09 VITALS
WEIGHT: 315 LBS | HEART RATE: 91 BPM | HEIGHT: 70 IN | BODY MASS INDEX: 45.1 KG/M2 | DIASTOLIC BLOOD PRESSURE: 62 MMHG | SYSTOLIC BLOOD PRESSURE: 118 MMHG | RESPIRATION RATE: 16 BRPM | OXYGEN SATURATION: 93 %

## 2024-08-09 DIAGNOSIS — M51.36 DEGENERATIVE DISC DISEASE, LUMBAR: ICD-10-CM

## 2024-08-09 DIAGNOSIS — Z86.73 HISTORY OF STROKE: ICD-10-CM

## 2024-08-09 DIAGNOSIS — E11.65 UNCONTROLLED TYPE 2 DIABETES MELLITUS WITH HYPERGLYCEMIA: ICD-10-CM

## 2024-08-09 DIAGNOSIS — Z79.891 LONG TERM PRESCRIPTION OPIATE USE: ICD-10-CM

## 2024-08-09 DIAGNOSIS — I10 ESSENTIAL (PRIMARY) HYPERTENSION: Primary | ICD-10-CM

## 2024-08-09 DIAGNOSIS — E78.2 MIXED HYPERLIPIDEMIA: ICD-10-CM

## 2024-08-09 DIAGNOSIS — Z79.4 TYPE 2 DIABETES MELLITUS WITH DIABETIC POLYNEUROPATHY, WITH LONG-TERM CURRENT USE OF INSULIN: ICD-10-CM

## 2024-08-09 DIAGNOSIS — E11.42 TYPE 2 DIABETES MELLITUS WITH DIABETIC POLYNEUROPATHY, WITH LONG-TERM CURRENT USE OF INSULIN: ICD-10-CM

## 2024-08-09 DIAGNOSIS — I10 ESSENTIAL (PRIMARY) HYPERTENSION: ICD-10-CM

## 2024-08-09 PROBLEM — J45.40 MODERATE PERSISTENT ASTHMA WITHOUT COMPLICATION: Status: ACTIVE | Noted: 2021-07-22

## 2024-08-09 PROBLEM — G45.9 TIA (TRANSIENT ISCHEMIC ATTACK): Status: RESOLVED | Noted: 2024-06-14 | Resolved: 2024-08-09

## 2024-08-09 PROBLEM — K21.9 GASTROESOPHAGEAL REFLUX DISEASE WITHOUT ESOPHAGITIS: Status: ACTIVE | Noted: 2021-07-22

## 2024-08-09 PROBLEM — G89.4 CHRONIC PAIN DISORDER: Status: ACTIVE | Noted: 2020-07-23

## 2024-08-09 PROBLEM — M51.34 DEGENERATIVE DISC DISEASE, THORACIC: Status: ACTIVE | Noted: 2023-05-19

## 2024-08-09 PROBLEM — F32.A DEPRESSIVE DISORDER: Status: ACTIVE | Noted: 2021-07-22

## 2024-08-09 LAB
ALBUMIN SERPL-MCNC: 4.2 G/DL (ref 3.5–5.2)
ALBUMIN/GLOB SERPL: 1.2 G/DL
ALP SERPL-CCNC: 87 U/L (ref 39–117)
ALT SERPL W P-5'-P-CCNC: 16 U/L (ref 1–41)
ANION GAP SERPL CALCULATED.3IONS-SCNC: 13.7 MMOL/L (ref 5–15)
AST SERPL-CCNC: 20 U/L (ref 1–40)
BILIRUB SERPL-MCNC: <0.2 MG/DL (ref 0–1.2)
BUN SERPL-MCNC: 12 MG/DL (ref 8–23)
BUN/CREAT SERPL: 12 (ref 7–25)
CALCIUM SPEC-SCNC: 9.9 MG/DL (ref 8.6–10.5)
CHLORIDE SERPL-SCNC: 97 MMOL/L (ref 98–107)
CHOLEST SERPL-MCNC: 129 MG/DL (ref 0–200)
CO2 SERPL-SCNC: 27.3 MMOL/L (ref 22–29)
CREAT SERPL-MCNC: 1 MG/DL (ref 0.76–1.27)
DEPRECATED RDW RBC AUTO: 43.8 FL (ref 37–54)
EGFRCR SERPLBLD CKD-EPI 2021: 84 ML/MIN/1.73
ERYTHROCYTE [DISTWIDTH] IN BLOOD BY AUTOMATED COUNT: 13.7 % (ref 12.3–15.4)
GLOBULIN UR ELPH-MCNC: 3.4 GM/DL
GLUCOSE SERPL-MCNC: 241 MG/DL (ref 65–99)
HBA1C MFR BLD: 9.9 % (ref 4.8–5.6)
HCT VFR BLD AUTO: 43.5 % (ref 37.5–51)
HDLC SERPL-MCNC: 39 MG/DL (ref 40–60)
HGB BLD-MCNC: 13.9 G/DL (ref 13–17.7)
LDLC SERPL CALC-MCNC: 52 MG/DL (ref 0–100)
LDLC/HDLC SERPL: 1.07 {RATIO}
MCH RBC QN AUTO: 28.4 PG (ref 26.6–33)
MCHC RBC AUTO-ENTMCNC: 32 G/DL (ref 31.5–35.7)
MCV RBC AUTO: 89 FL (ref 79–97)
PLATELET # BLD AUTO: 379 10*3/MM3 (ref 140–450)
PMV BLD AUTO: 10.5 FL (ref 6–12)
POTASSIUM SERPL-SCNC: 5.2 MMOL/L (ref 3.5–5.2)
PROT SERPL-MCNC: 7.6 G/DL (ref 6–8.5)
RBC # BLD AUTO: 4.89 10*6/MM3 (ref 4.14–5.8)
SODIUM SERPL-SCNC: 138 MMOL/L (ref 136–145)
TRIGL SERPL-MCNC: 241 MG/DL (ref 0–150)
VLDLC SERPL-MCNC: 38 MG/DL (ref 5–40)
WBC NRBC COR # BLD AUTO: 10.58 10*3/MM3 (ref 3.4–10.8)

## 2024-08-09 PROCEDURE — 80053 COMPREHEN METABOLIC PANEL: CPT | Performed by: STUDENT IN AN ORGANIZED HEALTH CARE EDUCATION/TRAINING PROGRAM

## 2024-08-09 PROCEDURE — 83036 HEMOGLOBIN GLYCOSYLATED A1C: CPT | Performed by: STUDENT IN AN ORGANIZED HEALTH CARE EDUCATION/TRAINING PROGRAM

## 2024-08-09 PROCEDURE — 85027 COMPLETE CBC AUTOMATED: CPT | Performed by: STUDENT IN AN ORGANIZED HEALTH CARE EDUCATION/TRAINING PROGRAM

## 2024-08-09 PROCEDURE — 80061 LIPID PANEL: CPT | Performed by: STUDENT IN AN ORGANIZED HEALTH CARE EDUCATION/TRAINING PROGRAM

## 2024-08-09 PROCEDURE — 36415 COLL VENOUS BLD VENIPUNCTURE: CPT | Performed by: STUDENT IN AN ORGANIZED HEALTH CARE EDUCATION/TRAINING PROGRAM

## 2024-08-09 RX ORDER — OMEPRAZOLE 40 MG/1
40 CAPSULE, DELAYED RELEASE ORAL 2 TIMES DAILY
Qty: 180 CAPSULE | Refills: 3 | Status: SHIPPED | OUTPATIENT
Start: 2024-08-09

## 2024-08-09 RX ORDER — DULOXETIN HYDROCHLORIDE 60 MG/1
60 CAPSULE, DELAYED RELEASE ORAL 2 TIMES DAILY
Qty: 180 CAPSULE | Refills: 3 | Status: SHIPPED | OUTPATIENT
Start: 2024-08-09

## 2024-08-09 RX ORDER — HYDROCODONE BITARTRATE AND ACETAMINOPHEN 7.5; 325 MG/1; MG/1
1 TABLET ORAL EVERY 6 HOURS PRN
Qty: 120 TABLET | Refills: 0 | Status: SHIPPED | OUTPATIENT
Start: 2024-08-09

## 2024-08-09 RX ORDER — GLUCAGON 3 MG/1
POWDER NASAL
COMMUNITY

## 2024-08-09 RX ORDER — DULAGLUTIDE 3 MG/.5ML
3 INJECTION, SOLUTION SUBCUTANEOUS WEEKLY
Qty: 2 ML | Refills: 11 | Status: SHIPPED | OUTPATIENT
Start: 2024-08-09

## 2024-08-09 RX ORDER — FLURBIPROFEN SODIUM 0.3 MG/ML
SOLUTION/ DROPS OPHTHALMIC DAILY
COMMUNITY
Start: 2024-07-08 | End: 2024-08-09 | Stop reason: SDUPTHER

## 2024-08-09 RX ORDER — ATORVASTATIN CALCIUM 20 MG/1
20 TABLET, FILM COATED ORAL DAILY
Qty: 90 TABLET | Refills: 3 | Status: SHIPPED | OUTPATIENT
Start: 2024-08-09

## 2024-08-09 RX ORDER — DULAGLUTIDE 3 MG/.5ML
INJECTION, SOLUTION SUBCUTANEOUS
COMMUNITY
Start: 2024-07-08 | End: 2024-08-09 | Stop reason: SDUPTHER

## 2024-08-09 RX ORDER — CARVEDILOL 12.5 MG/1
12.5 TABLET ORAL 2 TIMES DAILY WITH MEALS
Qty: 180 TABLET | Refills: 3 | Status: SHIPPED | OUTPATIENT
Start: 2024-08-09

## 2024-08-09 RX ORDER — ASPIRIN 81 MG/1
81 TABLET, CHEWABLE ORAL DAILY
Qty: 90 TABLET | Refills: 3 | Status: SHIPPED | OUTPATIENT
Start: 2024-08-09

## 2024-08-09 RX ORDER — PEN NEEDLE, DIABETIC 29 G X1/2"
1 NEEDLE, DISPOSABLE MISCELLANEOUS DAILY
Qty: 60 EACH | Refills: 1 | Status: SHIPPED | OUTPATIENT
Start: 2024-08-09

## 2024-08-09 RX ORDER — AMLODIPINE BESYLATE 10 MG/1
10 TABLET ORAL
Qty: 90 TABLET | Refills: 3 | Status: SHIPPED | OUTPATIENT
Start: 2024-08-09

## 2024-08-09 RX ORDER — METFORMIN HYDROCHLORIDE 500 MG/1
500 TABLET, EXTENDED RELEASE ORAL 2 TIMES DAILY
Qty: 60 TABLET | Refills: 3 | Status: SHIPPED | OUTPATIENT
Start: 2024-08-09

## 2024-08-09 RX ORDER — INSULIN HUMAN 100 [IU]/ML
75 INJECTION, SUSPENSION SUBCUTANEOUS EVERY MORNING
COMMUNITY
Start: 2024-07-02

## 2024-08-09 RX ORDER — DAPAGLIFLOZIN 5 MG/1
10 TABLET, FILM COATED ORAL DAILY
Qty: 180 TABLET | Refills: 3 | Status: SHIPPED | OUTPATIENT
Start: 2024-08-09

## 2024-08-09 RX ORDER — PEN NEEDLE, DIABETIC 29 G X1/2"
NEEDLE, DISPOSABLE MISCELLANEOUS
COMMUNITY
Start: 2024-07-10 | End: 2024-08-09 | Stop reason: SDUPTHER

## 2024-08-09 RX ORDER — FLURBIPROFEN SODIUM 0.3 MG/ML
1 SOLUTION/ DROPS OPHTHALMIC 2 TIMES DAILY
Qty: 60 EACH | Refills: 3 | Status: SHIPPED | OUTPATIENT
Start: 2024-08-09

## 2024-08-09 RX ORDER — FINASTERIDE 5 MG/1
5 TABLET, FILM COATED ORAL DAILY
Qty: 90 TABLET | Refills: 3 | Status: SHIPPED | OUTPATIENT
Start: 2024-08-09

## 2024-08-09 RX ORDER — TRAZODONE HYDROCHLORIDE 100 MG/1
200 TABLET ORAL NIGHTLY
Qty: 90 TABLET | Refills: 3 | Status: SHIPPED | OUTPATIENT
Start: 2024-08-09

## 2024-08-09 NOTE — PROGRESS NOTES
"Chief Complaint  Chief Complaint   Patient presents with    Establish Care       Subjective        Matthieu Hawk is a 63 y.o. male who presents to [facility].  History of Present Illness    Matthieu is a 64yo male here to establish care and review medications.      Chronic low back pain  Has been present for several years and gradually worsening over time.  No inciting trauma  MRI thoracic/lumbar spine in June 2024 which showed mild spondylosis, moderate spinal stenosis.  Medications: Currently on Cymbalta 60 mg twice daily, Norco 7.5-325 mg every 6 hour which overall helps him to be functional but pain is still present.  Previous medications-baclofen (no improvement), Percocet (no improvement), pregabalin/gabapentin (side effect-tremors)  Previously underwent physical therapy without any improvement  Additionally, had several injections in his low back a few years ago with no improvement    Hypertension  Amlodipine 10 mg, carvedilol 12.5 mg twice daily      T2DM  Trulicity 3 mg weekly, Humulin 70/30 100u AM, 75u PM, Farxiga 10 mg daily  CGM at home - typically >300 (regardless of meals)      Stroke  Occurred in June 2024, left-sided weakness (much worse in LLE), has improved with PT/OT but is not quite back to baseline      Objective   /62   Pulse 91   Resp 16   Ht 177.8 cm (70\")   Wt (!) 157 kg (346 lb)   SpO2 93%   BMI 49.65 kg/m²     Estimated body mass index is 49.65 kg/m² as calculated from the following:    Height as of this encounter: 177.8 cm (70\").    Weight as of this encounter: 157 kg (346 lb).     Physical Exam   GEN: In no acute distress, non toxic appearing  HEENT: Pupils equal and reactive to light, sclera clear. Mucous membranes moist.   CV: Regular rate and rhythm, no murmurs, 2+ peripheral pulses.  Minimal nonpitting edema in bilateral lower extremities.  RESP: Lungs clear to auscultation in all lung fields bilaterally. No signs of respiratory " distress.  SKIN: No rashes  MSK: No joint erythema, deformity, or effusion. Good ROM in upper and lower extremities.  NEURO: AAO to person, place, and time. CN 2-12 intact grossly.   4 out of 5 strength with flexion and extension of left lower extremity, otherwise 5 out of 5 strength in other extremities  PSYCH: Affect normal, insight fair         Result Review :    The following data was reviewed by: Mustapha Barlow DO on 08/09/2024:  Common labs          6/16/2024    01:24 6/17/2024    04:31 6/18/2024    02:28   Common Labs   Glucose 270  239  78    BUN 25  20  29    Creatinine 1.14  1.09  1.63    Sodium 134  133  136    Potassium 4.5  4.5  4.3    Chloride 95  94  95    Calcium 9.5  9.8  9.5    Albumin  4.1     Total Bilirubin  0.3     Alkaline Phosphatase  85     AST (SGOT)  17     ALT (SGPT)  7     WBC 8.74  9.70  10.57    Hemoglobin 12.1  12.3  12.4    Hematocrit 38.8  38.6  39.2    Platelets 411  340  361      Data reviewed : Radiologic studies MRI thoracic/lumbar spine       Assessment and Plan     Diagnoses and all orders for this visit:    1. Essential (primary) hypertension (Primary)  Well-controlled with current regimen.  Continue amlodipine, carvedilol    -     CBC (No Diff)  -     Comprehensive metabolic panel; Future    2. Mixed hyperlipidemia  Obtain lipid panel today  Continue atorvastatin 20 mg daily, may need to increase pending lipid panel results    -     Lipid panel; Future    3. Type 2 diabetes mellitus with diabetic polyneuropathy, with long-term current use of insulin  Uncontrolled, most recent A1c 9.68 in June 2024  Continue Farxiga 10 mg daily, Trulicity 3 mg weekly    - Will restart metformin as he was on this in the past without any known side effects -start at 500 mg BID and would likely increase at next office visit    -Given his uncontrolled baseline blood sugars after reviewing CGM, will switch his insulin regimen to the following: Insulin glargine 100 units at bedtime, Humulin  "70/30 75 units a.m.    -     Hemoglobin A1c; Future  -     Comprehensive metabolic panel; Future    4. Long term prescription opiate use   Has been on chronic opioids for several years due to low back pain  Discussed risk of long-term opioid use in the long-term goal being to get him off of them  Also informed him of the random drug test that will be performed      5. Degenerative disc disease, lumbar   Pain somewhat controlled with current regimen of Cymbalta and Norco  Previous medications have been tried unsuccessfully due to lack of effect/side effects: Percocet, Lyrica, gabapentin, baclofen  Has also tried several injections as well as physical therapy without any improvement  At future visit will discuss maximizing nonopioid medications such as Tylenol, NSAIDs as well as seeing if he is interested in retrying physical therapy  If pain worsens could consider referral to neurosurgery given his known degenerative disc disease as well as lumbar spinal stenosis      6. History of stroke  Sequelae of mild weakness in left lower extremity  Discussed importance of addressing his modifiable risk factors such as obesity, diabetes control, blood pressure control, and ongoing smoking abstinence      Other orders  -     amLODIPine (NORVASC) 10 MG tablet; Take 1 tablet by mouth Daily. Indications: High Blood Pressure Disorder  Dispense: 90 tablet; Refill: 3  -     aspirin 81 MG chewable tablet; Chew 1 tablet Daily. Indications: Disease involving Lipid Deposits in the Arteries  Dispense: 90 tablet; Refill: 3  -     atorvastatin (LIPITOR) 20 MG tablet; Take 1 tablet by mouth Daily. Indications: High Amount of Fats in the Blood  Dispense: 90 tablet; Refill: 3  -     B-D UF III MINI PEN NEEDLES 31G X 5 MM misc; Inject 1 Needle under the skin into the appropriate area as directed 2 (Two) Times a Day. use as directed  Dispense: 60 each; Refill: 3  -     BD Insulin Syringe U/F 31G X 5/16\" 1 ML misc; Inject 1 syringe under the skin " into the appropriate area as directed Daily.  Dispense: 60 each; Refill: 1  -     carvedilol (COREG) 12.5 MG tablet; Take 1 tablet by mouth 2 (Two) Times a Day With Meals. Indications: High Blood Pressure Disorder  Dispense: 180 tablet; Refill: 3  -     Continuous Glucose Sensor (FreeStyle Mason 2 Sensor) misc; 1 Units by Other route Every 14 (Fourteen) Days. Apply every 14 days and use to check blood sugar  Dispense: 4 each; Refill: 3  -     dapagliflozin (FARXIGA) 5 MG tablet tablet; Take 2 tablets by mouth Daily. Indications: Type 2 Diabetes  Dispense: 180 tablet; Refill: 3  -     finasteride (PROSCAR) 5 MG tablet; Take 1 tablet by mouth Daily. Indications: Benign Enlargement of Prostate  Dispense: 90 tablet; Refill: 3  -     DULoxetine (CYMBALTA) 60 MG capsule; Take 1 capsule by mouth 2 (Two) Times a Day. Indications: Fibromyalgia Syndrome  Dispense: 180 capsule; Refill: 3  -     omeprazole (priLOSEC) 40 MG capsule; Take 1 capsule by mouth 2 (Two) Times a Day. Indications: Heartburn  Dispense: 180 capsule; Refill: 3  -     traZODone (DESYREL) 100 MG tablet; Take 2 tablets by mouth Every Night. Indications: Trouble Sleeping  Dispense: 90 tablet; Refill: 3  -     Trulicity 3 MG/0.5ML solution pen-injector; Inject 0.5 mL under the skin into the appropriate area as directed 1 (One) Time Per Week.  Dispense: 2 mL; Refill: 11  -     metFORMIN ER (GLUCOPHAGE-XR) 500 MG 24 hr tablet; Take 1 tablet by mouth 2 (Two) Times a Day.  Dispense: 60 tablet; Refill: 3  -     Insulin Glargine (LANTUS SOLOSTAR) 100 UNIT/ML injection pen; Inject 100 Units under the skin into the appropriate area as directed Every Night.  Dispense: 30 mL; Refill: 3              I spent 95 minutes caring for Matthieu on this date of service. This time includes time spent by me in the following activities:preparing for the visit, reviewing tests, obtaining and/or reviewing a separately obtained history, performing a medically appropriate examination  and/or evaluation , counseling and educating the patient/family/caregiver, ordering medications, tests, or procedures, and documenting information in the medical record  Follow Up     Return in about 4 weeks (around 9/6/2024) for Recheck - dizziness, diabetees.

## 2024-08-15 ENCOUNTER — HOME CARE VISIT (OUTPATIENT)
Dept: HOME HEALTH SERVICES | Facility: HOME HEALTHCARE | Age: 64
End: 2024-08-15
Payer: COMMERCIAL

## 2024-08-15 PROCEDURE — G0299 HHS/HOSPICE OF RN EA 15 MIN: HCPCS

## 2024-08-16 VITALS
RESPIRATION RATE: 17 BRPM | OXYGEN SATURATION: 98 % | SYSTOLIC BLOOD PRESSURE: 128 MMHG | TEMPERATURE: 98 F | HEART RATE: 88 BPM | DIASTOLIC BLOOD PRESSURE: 66 MMHG

## 2024-08-30 ENCOUNTER — TELEPHONE (OUTPATIENT)
Dept: FAMILY MEDICINE CLINIC | Facility: CLINIC | Age: 64
End: 2024-08-30

## 2024-08-30 RX ORDER — CLOTRIMAZOLE 1 G/ML
SOLUTION TOPICAL 2 TIMES DAILY
Qty: 15 ML | Refills: 1 | Status: SHIPPED | OUTPATIENT
Start: 2024-08-30

## 2024-08-30 NOTE — TELEPHONE ENCOUNTER
Caller: CLYDE SPEARS    Relationship: Emergency Contact    Best call back number:     [phone]       What medication are you requesting: SOMETHING FOR THE SYMPTOMS BELOW    What are your current symptoms: REDNESS AND BURNING ON PENIS    How long have you been experiencing symptoms: 2 MONTHS    Have you had these symptoms before:    [] Yes  [x] No    Have you been treated for these symptoms before:   [] Yes  [] No    If a prescription is needed, what is your preferred pharmacy and phone number: [facility] - [place], [place] - [address] [facility] [phone] [facility] [phone]      Additional notes:TRIED OVER THE COUNTER MEDICATION AND IT IS NOT WORKING

## 2024-09-06 ENCOUNTER — LAB (OUTPATIENT)
Dept: FAMILY MEDICINE CLINIC | Facility: CLINIC | Age: 64
End: 2024-09-06
Payer: MEDICARE

## 2024-09-06 ENCOUNTER — OFFICE VISIT (OUTPATIENT)
Dept: FAMILY MEDICINE CLINIC | Facility: CLINIC | Age: 64
End: 2024-09-06
Payer: MEDICARE

## 2024-09-06 VITALS
HEART RATE: 103 BPM | WEIGHT: 315 LBS | DIASTOLIC BLOOD PRESSURE: 75 MMHG | RESPIRATION RATE: 18 BRPM | HEIGHT: 70 IN | OXYGEN SATURATION: 92 % | SYSTOLIC BLOOD PRESSURE: 146 MMHG | BODY MASS INDEX: 45.1 KG/M2

## 2024-09-06 DIAGNOSIS — Z79.891 LONG TERM PRESCRIPTION OPIATE USE: ICD-10-CM

## 2024-09-06 DIAGNOSIS — Z79.4 TYPE 2 DIABETES MELLITUS WITH DIABETIC POLYNEUROPATHY, WITH LONG-TERM CURRENT USE OF INSULIN: Primary | ICD-10-CM

## 2024-09-06 DIAGNOSIS — Z79.4 UNCONTROLLED DIABETES MELLITUS WITH HYPERGLYCEMIA, WITH LONG-TERM CURRENT USE OF INSULIN: ICD-10-CM

## 2024-09-06 DIAGNOSIS — I10 ESSENTIAL (PRIMARY) HYPERTENSION: ICD-10-CM

## 2024-09-06 DIAGNOSIS — E11.42 TYPE 2 DIABETES MELLITUS WITH DIABETIC POLYNEUROPATHY, WITH LONG-TERM CURRENT USE OF INSULIN: Primary | ICD-10-CM

## 2024-09-06 DIAGNOSIS — E11.65 UNCONTROLLED DIABETES MELLITUS WITH HYPERGLYCEMIA, WITH LONG-TERM CURRENT USE OF INSULIN: ICD-10-CM

## 2024-09-06 LAB — ALBUMIN UR-MCNC: 1.3 MG/DL

## 2024-09-06 PROCEDURE — G0480 DRUG TEST DEF 1-7 CLASSES: HCPCS | Performed by: STUDENT IN AN ORGANIZED HEALTH CARE EDUCATION/TRAINING PROGRAM

## 2024-09-06 PROCEDURE — 80307 DRUG TEST PRSMV CHEM ANLYZR: CPT | Performed by: STUDENT IN AN ORGANIZED HEALTH CARE EDUCATION/TRAINING PROGRAM

## 2024-09-06 PROCEDURE — 82043 UR ALBUMIN QUANTITATIVE: CPT | Performed by: STUDENT IN AN ORGANIZED HEALTH CARE EDUCATION/TRAINING PROGRAM

## 2024-09-06 RX ORDER — METFORMIN HCL 500 MG
1000 TABLET, EXTENDED RELEASE 24 HR ORAL 2 TIMES DAILY
Qty: 60 TABLET | Refills: 3 | Status: SHIPPED | OUTPATIENT
Start: 2024-09-06

## 2024-09-06 NOTE — PROGRESS NOTES
"Chief Complaint  Chief Complaint   Patient presents with    Diabetes     1 month follow up       Subjective        Matthieu Hawk is a 63 y.o. male who presents to [facility].  History of Present Illness    Matthieu is a 63-year-old male here for follow-up of diabetes.    Diabetes  Metformin 500 mg twice daily, Farxiga 10 mg daily, Trulicity 3 mg weekly  Glargine 100 units at bedtime  Humulin 70/30 75 units a.m.    Blood sugar averages (last 30 days)  12-6am 166  6-12pm 165  12-6pm 171  6p-12am 191    Down to 50s few times/week between 5-7am        Objective   /75   Pulse 103   Resp 18   Ht 177.8 cm (70\")   Wt (!) 157 kg (346 lb)   SpO2 92%   BMI 49.65 kg/m²     Estimated body mass index is 49.65 kg/m² as calculated from the following:    Height as of this encounter: 177.8 cm (70\").    Weight as of this encounter: 157 kg (346 lb).     Physical Exam   GEN: In no acute distress, non toxic appearing  HEENT: MMM. EOMI.   CV: No extremity edema.   RESP: No signs of respiratory distress.  SKIN: No rashes  MSK: No deformity.   NEURO: Moves all extremities equally. Alert and appropriate           Result Review :      Assessment and Plan     Diagnoses and all orders for this visit:    1. Type 2 diabetes mellitus with diabetic polyneuropathy, with long-term current use of insulin (Primary)  Blood sugars remain uncontrolled based on CGM  Unfortunately is having problems with both hyperglycemia and hypoglycemia    Will increase his metformin to 1000 mg twice daily and decrease his total daily insulin  Discontinue Humulin 70/30  Change his glargine to 75 units twice daily (discussed splitting up  each dose into 2 injections for better absorption)    -     MicroAlbumin, Urine, Random - Urine, Clean Catch      2. Long term prescription opiate use  Denies need for prescription at this time  Continue Norco 7.5-325 mg every 6 hours PRN  Controlled substance contract on file  UDS today    -    "  Pain Management Profile (13 Drugs) Urine - Urine, Clean Catch    3. Essential (primary) hypertension  Blood pressure above goal today at 146/75  Pressure typically well below goal at most office visits so will not make changes to his medications at this time continue carvedilol, amlodipine    Should ideally be on ACEi/ARB given his diabetes; will plan to add this at next office visit and likely make changes to his other antihypertensives to compensate         Follow Up     Return in about 4 weeks (around 10/4/2024) for Recheck.     [[id number]]

## 2024-09-11 LAB
AMPHETAMINES UR QL SCN: NEGATIVE NG/ML
BARBITURATES UR QL SCN: NEGATIVE NG/ML
BENZODIAZ UR QL SCN: NEGATIVE NG/ML
BZE UR QL SCN: NEGATIVE NG/ML
CANNABINOIDS UR QL SCN: NEGATIVE NG/ML
CREAT UR-MCNC: 51.9 MG/DL (ref 20–300)
FENTANYL UR-MCNC: NEGATIVE PG/ML
LABORATORY COMMENT REPORT: ABNORMAL
MEPERIDINE UR QL: NEGATIVE NG/ML
METHADONE UR QL SCN: NEGATIVE NG/ML
OPIATES UR QL SCN: POSITIVE NG/ML
OXYCODONE+OXYMORPHONE UR QL SCN: NEGATIVE NG/ML
PCP UR QL: NEGATIVE NG/ML
PH UR: 5.7 [PH] (ref 4.5–8.9)
PROPOXYPH UR QL SCN: NEGATIVE NG/ML
SP GR UR: 1.01
TRAMADOL UR QL SCN: NEGATIVE NG/ML

## 2024-09-13 ENCOUNTER — TELEPHONE (OUTPATIENT)
Dept: FAMILY MEDICINE CLINIC | Facility: CLINIC | Age: 64
End: 2024-09-13
Payer: MEDICARE

## 2024-09-13 NOTE — TELEPHONE ENCOUNTER
PT'S SPOUSE REPORTS PT'S BLOOD SUGAR -200 AT NIGHT, 50S IN THE MORNING. PT ASKS IF HE SHOULD ADJUST INSULIN.

## 2024-09-16 NOTE — TELEPHONE ENCOUNTER
Attempted to call number provided, straight to voicemail and unable to leave message. Please give patient or wife Dr. Barlow message when she calls back.

## 2024-10-01 DIAGNOSIS — E11.65 UNCONTROLLED TYPE 2 DIABETES MELLITUS WITH HYPERGLYCEMIA: ICD-10-CM

## 2024-10-01 RX ORDER — HYDROCODONE BITARTRATE AND ACETAMINOPHEN 7.5; 325 MG/1; MG/1
1 TABLET ORAL EVERY 6 HOURS PRN
Qty: 120 TABLET | Refills: 0 | Status: SHIPPED | OUTPATIENT
Start: 2024-10-01

## 2024-11-01 DIAGNOSIS — Z79.891 LONG TERM PRESCRIPTION OPIATE USE: Primary | ICD-10-CM

## 2024-11-01 RX ORDER — HYDROCODONE BITARTRATE AND ACETAMINOPHEN 7.5; 325 MG/1; MG/1
1 TABLET ORAL EVERY 6 HOURS PRN
Qty: 120 TABLET | Refills: 0 | Status: SHIPPED | OUTPATIENT
Start: 2024-11-01

## 2024-11-08 ENCOUNTER — OFFICE VISIT (OUTPATIENT)
Dept: FAMILY MEDICINE CLINIC | Facility: CLINIC | Age: 64
End: 2024-11-08
Payer: MEDICARE

## 2024-11-08 VITALS
WEIGHT: 315 LBS | HEIGHT: 70 IN | SYSTOLIC BLOOD PRESSURE: 132 MMHG | OXYGEN SATURATION: 95 % | DIASTOLIC BLOOD PRESSURE: 74 MMHG | BODY MASS INDEX: 45.1 KG/M2 | HEART RATE: 92 BPM

## 2024-11-08 DIAGNOSIS — Z12.12 SCREENING FOR COLORECTAL CANCER: ICD-10-CM

## 2024-11-08 DIAGNOSIS — E11.65 UNCONTROLLED DIABETES MELLITUS WITH HYPERGLYCEMIA, WITH LONG-TERM CURRENT USE OF INSULIN: Primary | ICD-10-CM

## 2024-11-08 DIAGNOSIS — Z12.11 SCREENING FOR COLORECTAL CANCER: ICD-10-CM

## 2024-11-08 DIAGNOSIS — Z79.4 UNCONTROLLED DIABETES MELLITUS WITH HYPERGLYCEMIA, WITH LONG-TERM CURRENT USE OF INSULIN: Primary | ICD-10-CM

## 2024-11-08 DIAGNOSIS — Z23 NEED FOR VACCINATION: ICD-10-CM

## 2024-11-08 RX ORDER — METFORMIN HYDROCHLORIDE 500 MG/1
1000 TABLET, EXTENDED RELEASE ORAL 2 TIMES DAILY
Qty: 60 TABLET | Refills: 3 | Status: SHIPPED | OUTPATIENT
Start: 2024-11-08

## 2024-11-08 RX ORDER — LOSARTAN POTASSIUM AND HYDROCHLOROTHIAZIDE 12.5; 1 MG/1; MG/1
1 TABLET ORAL DAILY
COMMUNITY
Start: 2024-11-04

## 2024-11-08 NOTE — PROGRESS NOTES
"Chief Complaint  Chief Complaint   Patient presents with    Follow-up       Subjective        Matthieu Hawk is a 64 y.o. male who presents to [facility].  History of Present Illness    Matthieu is a 64-year-old male here for diabetes follow-up.        Diabetes  Glargine 60 units at bedtime, 75 units a.m.  Farxiga 10 mg daily  Trulicity 3 mg weekly  At last appointment was supposed to increase his metformin from 500 to 1000 mg twice daily -patient states that he discontinued his metformin after reading things online about how it was bad for you; additionally he started to have some bad dreams which improved when he stopped the metformin.    Fasting a.m. blood sugars are typically in the 200s; states that he very rarely has any low blood sugars, typically only when he forgets to eat or is very physically active          Objective   /74   Pulse 92   Ht 177.8 cm (70\")   Wt (!) 148 kg (327 lb 3.2 oz)   SpO2 95%   BMI 46.95 kg/m²     Estimated body mass index is 46.95 kg/m² as calculated from the following:    Height as of this encounter: 177.8 cm (70\").    Weight as of this encounter: 148 kg (327 lb 3.2 oz).     Physical Exam     GEN: In no acute distress, non toxic appearing  HEENT: MMM. EOMI.   CV: No extremity edema.   RESP: No signs of respiratory distress.  SKIN: No rashes  MSK: No deformity.   NEURO: Moves all extremities equally. Alert and appropriate            Result Review :              Assessment and Plan     Diagnoses and all orders for this visit:    1. Uncontrolled diabetes mellitus with hyperglycemia, with long-term current use of insulin (Primary)  Blood sugars remain above goal  Discussed options going forward which include increasing insulin versus starting new medication (pioglitazone) versus restarting metformin.  At my recommendation, patient is agreeable to restarting his metformin 1000 mg twice daily.    Continue Farxiga, Trulicity, glargine.    Will " obtain hemoglobin A1c at next appointment.      2. Need for vaccination  -     Fluzone High-Dose 65+yrs (1630-5847)    3. Screening for colorectal cancer  -     Cologuard - Stool, Per Rectum; Future    Other orders  -     metFORMIN ER (GLUCOPHAGE-XR) 500 MG 24 hr tablet; Take 2 tablets by mouth 2 (Two) Times a Day. Indications: Type 2 Diabetes  Dispense: 60 tablet; Refill: 3  -     Cancel: Fluzone >6mos ([id number])            Follow Up     Return for Recheck - 4-6 weeks.

## 2024-12-02 ENCOUNTER — TELEPHONE (OUTPATIENT)
Dept: FAMILY MEDICINE CLINIC | Facility: CLINIC | Age: 64
End: 2024-12-02
Payer: MEDICARE

## 2024-12-02 DIAGNOSIS — Z79.891 LONG TERM PRESCRIPTION OPIATE USE: ICD-10-CM

## 2024-12-02 RX ORDER — HYDROCODONE BITARTRATE AND ACETAMINOPHEN 7.5; 325 MG/1; MG/1
1 TABLET ORAL EVERY 6 HOURS PRN
Qty: 120 TABLET | Refills: 0 | Status: SHIPPED | OUTPATIENT
Start: 2024-12-02

## 2024-12-02 NOTE — TELEPHONE ENCOUNTER
Caller: CLYDE SPEARS    Relationship: Emergency Contact    Best call back number:     [phone]       Requested Prescriptions:   HYDROcodone-acetaminophen (NORCO) 7.5-325 MG per tablet     Pharmacy where request should be sent: [facility] - [place], [place] - [address] [facility] [phone]  - [phone] FX     Last office visit with prescribing clinician: 11/8/2024   Last telemedicine visit with prescribing clinician: Visit date not found   Next office visit with prescribing clinician: 12/20/2024     Additional details provided by patient:     Does the patient have less than a 3 day supply:  [x] Yes  [] No    Would you like a call back once the refill request has been completed: [] Yes [] No    If the office needs to give you a call back, can they leave a voicemail: [] Yes [] No    Hermelindo Reid Rep   12/02/24 12:23 EST

## 2024-12-04 ENCOUNTER — TELEPHONE (OUTPATIENT)
Dept: FAMILY MEDICINE CLINIC | Facility: CLINIC | Age: 64
End: 2024-12-04

## 2024-12-04 RX ORDER — METFORMIN HYDROCHLORIDE 500 MG/1
1000 TABLET, EXTENDED RELEASE ORAL 2 TIMES DAILY
Qty: 180 TABLET | Refills: 0 | Status: SHIPPED | OUTPATIENT
Start: 2024-12-04

## 2024-12-30 RX ORDER — METFORMIN HYDROCHLORIDE 500 MG/1
1000 TABLET, EXTENDED RELEASE ORAL 2 TIMES DAILY
Qty: 180 TABLET | Refills: 0 | Status: SHIPPED | OUTPATIENT
Start: 2024-12-30

## 2024-12-30 NOTE — PROGRESS NOTES
[facility] MEDICINE SERVICE  DAILY PROGRESS NOTE    NAME: Matthieu Hawk  : 1960  MRN: [id number]      LOS: 1 day     PROVIDER OF SERVICE: Timothy Duane Brammell, MD    Chief Complaint: TIA (transient ischemic attack)    Subjective:     Interval History:  History taken from: patient  Patient Complaints: Patient primarily with the left hip and left low back pain that he states is worse from fall several weeks prior.  He denies any severe shortness of breath.  He is wearing his positive pressure sleep apparatus at the evening.  Denies any sputum production.  Eating without issue.  No bowel complaints.  Urinating without issue.  Denies any other additional acute issues.  Questions as to his MRI findings.  Had attempted pain management injections prior without success.      Review of Systems:   Review of Systems   All other systems reviewed and are negative.      Objective:     Vital Signs  Temp:  [97.7 °F (36.5 °C)-98.5 °F (36.9 °C)] 97.9 °F (36.6 °C)  Heart Rate:  [71-86] 86  Resp:  [11-24] 21  BP: (127-191)/() 191/103  Flow (L/min):  [2] 2   Body mass index is 53.09 kg/m².    Physical Exam  Physical Exam  Constitutional:       Appearance: He is obese.   HENT:      Head: Normocephalic.   Cardiovascular:      Rate and Rhythm: Normal rate.   Pulmonary:      Effort: Pulmonary effort is normal.      Breath sounds: Normal breath sounds.   Abdominal:      General: Bowel sounds are normal.      Palpations: Abdomen is soft.      Tenderness: There is no abdominal tenderness.   Musculoskeletal:         General: No swelling.      Comments: Trace edema bilaterally   Neurological:      Mental Status: He is alert. Mental status is at baseline.   Psychiatric:         Behavior: Behavior normal.         Scheduled Meds   amLODIPine, 5 mg, Oral, Q24H  aspirin, 81 mg, Oral, Daily   Or  aspirin, 300 mg, Rectal, Daily  atorvastatin, 80 mg, Oral, Nightly  carvedilol, 12.5 mg, Oral, BID With Meals  clopidogrel, 75 mg,  "CC: Here for routine prenatal visit   31 year old y/o  @ 23w0d with Estimated Date of Delivery: 2025     /58   Ht 1.549 m (5' 1\")   Wt 57.7 kg (127 lb 3.2 oz)   LMP 2024 (Exact Date)   BMI 24.03 kg/m    See OB flowsheet  + fetal movement, no contractions, no bleeding, no loss of fluid   Discussed monitoring fetal movement     1) concerns:    2) Routine: Blood type A+ RI tdap [ ] flu [ ] GS [nl, 4th BOY] NIPT [nl] AFP [declines]   Covid v [v x 2] gtt [ ]  Ppbc   3) Risk factors:   A: CS x 3: RCS  25  B: hypothyroid: recheck per TM, TSH 3.4- inc to 150 mcg --recheck today   TSH   1st trimester:  0.1-2.5 mU/L   2nd trimester:  0.2-3 mU/L  3rd trimester:  0.3-3 mU/L    4) Return: 4 week    Liudmila     " Oral, Daily  DULoxetine, 60 mg, Oral, Q12H  finasteride, 5 mg, Oral, Daily  folic acid, 1 mg, Oral, Daily  gabapentin, 300 mg, Oral, TID  heparin (porcine), 5,000 Units, Subcutaneous, Q8H  insulin glargine, 10 Units, Subcutaneous, Q12H  insulin lispro, 2-9 Units, Subcutaneous, 4x Daily AC & at Bedtime  lisinopril, 10 mg, Oral, Q12H  sodium chloride, 10 mL, Intravenous, Q12H  sodium chloride, 10 mL, Intravenous, Q12H       PRN Meds     acetaminophen **OR** acetaminophen    aluminum-magnesium hydroxide-simethicone    senna-docusate sodium **AND** polyethylene glycol **AND** bisacodyl **AND** bisacodyl    Calcium Replacement - Follow Nurse / BPA Driven Protocol    dextrose    dextrose    glucagon (human recombinant)    HYDROcodone-acetaminophen    Magnesium Standard Dose Replacement - Follow Nurse / BPA Driven Protocol    ondansetron    Phosphorus Replacement - Follow Nurse / BPA Driven Protocol    Potassium Replacement - Follow Nurse / BPA Driven Protocol    sodium chloride    sodium chloride    sodium chloride    sodium chloride    sodium chloride   Infusions         Diagnostic Data    Results from last 7 days   Lab Units 06/17/24  0431   WBC 10*3/mm3 9.70   HEMOGLOBIN g/dL 12.3*   HEMATOCRIT % 38.6   PLATELETS 10*3/mm3 340   GLUCOSE mg/dL 239*   CREATININE mg/dL 1.09   BUN mg/dL 20   SODIUM mmol/L 133*   POTASSIUM mmol/L 4.5   AST (SGOT) U/L 17   ALT (SGPT) U/L 7   ALK PHOS U/L 85   BILIRUBIN mg/dL 0.3   ANION GAP mmol/L 10.8       MRI Lumbar Spine With & Without Contrast    Result Date: 6/15/2024  Impression: Contrast-enhanced MRI of the thoracic spine demonstrates no evidence of acute osseous findings or significant spinal canal or neuroforaminal impingement. There is no definite intramedullary cord signal abnormality or abnormal enhancement. The lumbar spine demonstrates some mild spondylosis changes and superimposed epidural lipomatosis which results in some areas of moderate spinal canal and neuroforaminal  narrowing. There is no abnormal enhancement. Electronically Signed: Omar Merritt MD  6/15/2024 4:20 PM EDT  Workstation ID: [id number]    MRI Thoracic Spine With & Without Contrast    Result Date: 6/15/2024  Impression: Contrast-enhanced MRI of the thoracic spine demonstrates no evidence of acute osseous findings or significant spinal canal or neuroforaminal impingement. There is no definite intramedullary cord signal abnormality or abnormal enhancement. The lumbar spine demonstrates some mild spondylosis changes and superimposed epidural lipomatosis which results in some areas of moderate spinal canal and neuroforaminal narrowing. There is no abnormal enhancement. Electronically Signed: Omar eMrritt MD  6/15/2024 4:20 PM EDT  Workstation ID: [id number]       I reviewed the patient's new clinical results.    Assessment:    Lower extremity neuropathy  Low back pain  Obstructive sleep apnea  Morbid obesity  Type 2 diabetes/insulin requiring  Acute renal insufficiency  Polypharmacy  Pain control   Hypertension  History of falling     Home medicines reviewed.  Old records reviewed.  Will obtain CT scanning of the pelvis.  Multiple meds resumed.  Home diabetic medicines resumed.  Follow-up labs.  Patient would benefit from short-term rehab less than 30 days of skilled services needed prior to returning home with his wife.  Active and Resolved Problems  Active Hospital Problems    Diagnosis  POA    **TIA (transient ischemic attack) [G45.9]  Yes      Resolved Hospital Problems   No resolved problems to display.           VTE Prophylaxis:  Pharmacologic & mechanical VTE prophylaxis orders are present.         Code status is   Code Status and Medical Interventions:   Ordered at: 06/15/24 0611     Level Of Support Discussed With:    Patient     Code Status (Patient has no pulse and is not breathing):    CPR (Attempt to Resuscitate)     Medical Interventions (Patient has pulse or is breathing):    Full Support       Plan for  disposition:rehab in 2 days    Time: 30 minutes    Signature: Electronically signed by Timothy Duane Brammell, MD, 06/17/24, 09:49 EDT.  [facility] Hospitalist Team    randall

## 2024-12-31 DIAGNOSIS — Z79.891 LONG TERM PRESCRIPTION OPIATE USE: ICD-10-CM

## 2024-12-31 RX ORDER — HYDROCODONE BITARTRATE AND ACETAMINOPHEN 7.5; 325 MG/1; MG/1
1 TABLET ORAL EVERY 6 HOURS PRN
Qty: 120 TABLET | Refills: 0 | Status: SHIPPED | OUTPATIENT
Start: 2024-12-31

## 2024-12-31 NOTE — TELEPHONE ENCOUNTER
Caller: CLYDE SPEARS    Relationship: Emergency Contact    Best call back number: [phone]    Requested Prescriptions:   Requested Prescriptions     Pending Prescriptions Disp Refills    HYDROcodone-acetaminophen (NORCO) 7.5-325 MG per tablet 120 tablet 0     Sig: Take 1 tablet by mouth Every 6 (Six) Hours As Needed for Moderate Pain.        Pharmacy where request should be sent: [facility] - [place], [place] - [address] [facility] [phone] [facility] [phone]      Last office visit with prescribing clinician: 11/8/2024   Last telemedicine visit with prescribing clinician: Visit date not found   Next office visit with prescribing clinician: Visit date not found     Does the patient have less than a 3 day supply:  [x] Yes  [] No    Hermelindo Cloud Rep   12/31/24 09:29 EST

## 2025-01-08 ENCOUNTER — TELEPHONE (OUTPATIENT)
Dept: FAMILY MEDICINE CLINIC | Facility: CLINIC | Age: 65
End: 2025-01-08
Payer: MEDICARE

## 2025-01-08 NOTE — TELEPHONE ENCOUNTER
We received this PA this morning. We will send ASAP and send back to pharmacy. This may take a few days or up to a week if I have to send chart notes, ect. To insurance

## 2025-01-08 NOTE — TELEPHONE ENCOUNTER
Caller: CLYDE SPEARS    Relationship to patient: Emergency Contact      Best call back number: [phone]     Provider: ARABELLA ENGLE     Medication PA needed:     HYDROcodone-acetaminophen (NORCO) 7.5-325 MG per tablet       Reason for call/Prior Auth: INSURANCE COVERAGE PURPOSES

## 2025-01-24 ENCOUNTER — LAB (OUTPATIENT)
Dept: FAMILY MEDICINE CLINIC | Facility: CLINIC | Age: 65
End: 2025-01-24
Payer: MEDICARE

## 2025-01-24 ENCOUNTER — OFFICE VISIT (OUTPATIENT)
Dept: FAMILY MEDICINE CLINIC | Facility: CLINIC | Age: 65
End: 2025-01-24
Payer: MEDICARE

## 2025-01-24 VITALS
DIASTOLIC BLOOD PRESSURE: 64 MMHG | HEART RATE: 87 BPM | SYSTOLIC BLOOD PRESSURE: 102 MMHG | BODY MASS INDEX: 45.1 KG/M2 | OXYGEN SATURATION: 97 % | WEIGHT: 315 LBS | HEIGHT: 70 IN

## 2025-01-24 DIAGNOSIS — E11.65 UNCONTROLLED DIABETES MELLITUS WITH HYPERGLYCEMIA, WITH LONG-TERM CURRENT USE OF INSULIN: Primary | ICD-10-CM

## 2025-01-24 DIAGNOSIS — Z79.4 UNCONTROLLED DIABETES MELLITUS WITH HYPERGLYCEMIA, WITH LONG-TERM CURRENT USE OF INSULIN: Primary | ICD-10-CM

## 2025-01-24 LAB — HBA1C MFR BLD: 9.3 % (ref 4.8–5.6)

## 2025-01-24 PROCEDURE — 36415 COLL VENOUS BLD VENIPUNCTURE: CPT | Performed by: STUDENT IN AN ORGANIZED HEALTH CARE EDUCATION/TRAINING PROGRAM

## 2025-01-24 PROCEDURE — 1159F MED LIST DOCD IN RCRD: CPT | Performed by: STUDENT IN AN ORGANIZED HEALTH CARE EDUCATION/TRAINING PROGRAM

## 2025-01-24 PROCEDURE — 83036 HEMOGLOBIN GLYCOSYLATED A1C: CPT | Performed by: STUDENT IN AN ORGANIZED HEALTH CARE EDUCATION/TRAINING PROGRAM

## 2025-01-24 PROCEDURE — 1160F RVW MEDS BY RX/DR IN RCRD: CPT | Performed by: STUDENT IN AN ORGANIZED HEALTH CARE EDUCATION/TRAINING PROGRAM

## 2025-01-24 PROCEDURE — 3074F SYST BP LT 130 MM HG: CPT | Performed by: STUDENT IN AN ORGANIZED HEALTH CARE EDUCATION/TRAINING PROGRAM

## 2025-01-24 PROCEDURE — 99214 OFFICE O/P EST MOD 30 MIN: CPT | Performed by: STUDENT IN AN ORGANIZED HEALTH CARE EDUCATION/TRAINING PROGRAM

## 2025-01-24 PROCEDURE — 3078F DIAST BP <80 MM HG: CPT | Performed by: STUDENT IN AN ORGANIZED HEALTH CARE EDUCATION/TRAINING PROGRAM

## 2025-01-24 RX ORDER — ONDANSETRON 8 MG/1
8 TABLET, ORALLY DISINTEGRATING ORAL EVERY 8 HOURS PRN
COMMUNITY
Start: 2024-11-16

## 2025-01-24 RX ORDER — GLUCAGON 3 MG/1
3 POWDER NASAL AS NEEDED
Qty: 1 EACH | Refills: 1 | Status: SHIPPED | OUTPATIENT
Start: 2025-01-24

## 2025-01-24 NOTE — PROGRESS NOTES
"Chief Complaint  Chief Complaint   Patient presents with    Follow-up    Med Refill     Does not have glucagon       Subjective        Matthieu Hawk is a 64 y.o. male who presents to [facility] Medicine.  History of Present Illness    Matthieu is a 64 year old male here for diabetes follow-up.      Type 2 diabetes  At last appointment 11/8 was restarted on metformin 1000 mg twice daily  The rest of his regimen includes Trulicity 3 mg weekly, Farxiga 10 mg daily, and insulin glargine 75 units AM, 60 units at bedtime.    Fasting blood sugars are typically 50s to 60s.   Occasionally in the afternoon blood sugars will get to a similar level if he skips meals.   Most of the time his blood sugars are in the upper 100s/lower 200s throughout the day.      Objective   /64   Pulse 87   Ht 177.8 cm (70\")   Wt (!) 143 kg (315 lb 12.8 oz)   SpO2 97%   BMI 45.31 kg/m²     Estimated body mass index is 45.31 kg/m² as calculated from the following:    Height as of this encounter: 177.8 cm (70\").    Weight as of this encounter: 143 kg (315 lb 12.8 oz).     Physical Exam   GEN: In no acute distress, non toxic appearing  HEENT: MMM. EOMI.   CV: No extremity edema.   RESP: No signs of respiratory distress.  SKIN: No rashes  MSK: No deformity.   NEURO: Moves all extremities equally. Alert and appropriate          PHQ-2 Depression Screening  Little interest or pleasure in doing things? Not at all   Feeling down, depressed, or hopeless? Not at all   PHQ-2 Total Score 0         Result Review :              Assessment and Plan     Diagnoses and all orders for this visit:    1. Uncontrolled diabetes mellitus with hyperglycemia, with long-term current use of insulin (Primary)  Chronic, uncontrolled condition    Will update hemoglobin A1c today  Given his frequent episodes of hypoglycemia will decrease his morning insulin glargine to 70 units.  Continue glargine 60 units at bedtime, Trulicity 3 mg weekly, " metformin 1000 TWICE daily, Farxiga 10 mg daily.    Prescription for glucagon sent to pharmacy.  Patient advised on what to do during episodes of hypoglycemia-when to treat with high-carb snack and when to use glucagon.    Patient advised to call the office if still having frequent episodes of hypoglycemia over the next 1-2 weeks.    -     Hemoglobin A1c    Other orders  -     Glucagon (Baqsimi One Pack) 3 MG/DOSE powder; Administer 3 mg into the nostril(s) as directed by provider As Needed (hypoglycemia).  Dispense: 1 each; Refill: 1  -     Insulin Glargine (LANTUS SOLOSTAR) 100 UNIT/ML injection pen; Inject 70 units subcutaneously each morning.  Inject 60 units subcutaneously each evening.  Indications: Type 2 Diabetes  Dispense: 30 mL; Refill: 3            Follow Up     Return in about 3 months (around 4/24/2025) for Welcome to Medicare Wellness.

## 2025-01-27 ENCOUNTER — TELEPHONE (OUTPATIENT)
Dept: FAMILY MEDICINE CLINIC | Facility: CLINIC | Age: 65
End: 2025-01-27
Payer: MEDICARE

## 2025-01-27 NOTE — TELEPHONE ENCOUNTER
----- Message from Mustapha Barlow sent at 1/27/2025  8:04 AM EST -----  Please call patient with lab results.  His hemoglobin A1c is going the right direction -it is down to 9.3% (previous was 9.9%).  We still have a ways to go to achieve our goal of less than 7%.  I have increased his Trulicity dose up to 4.5 mg which will hopefully help further.  Additionally, if he is able to continue watching his diet and losing more weight this should help us continue moving in the right direction.

## 2025-01-27 NOTE — TELEPHONE ENCOUNTER
I spoke to patient's wife about lab results and Trulicity dose increase. She expressed understanding.

## 2025-01-28 RX ORDER — GLUCAGON 3 MG/1
3 POWDER NASAL AS NEEDED
Qty: 1 EACH | Refills: 1 | Status: SHIPPED | OUTPATIENT
Start: 2025-01-28

## 2025-01-28 RX ORDER — ONDANSETRON 8 MG/1
8 TABLET, ORALLY DISINTEGRATING ORAL EVERY 8 HOURS PRN
Qty: 15 TABLET | Refills: 0 | Status: SHIPPED | OUTPATIENT
Start: 2025-01-28

## 2025-01-28 NOTE — TELEPHONE ENCOUNTER
Caller: CLYDE SPEARS    Relationship: Emergency Contact    Best call back number: [phone]    Requested Prescriptions:   Requested Prescriptions     Pending Prescriptions Disp Refills    ondansetron ODT (ZOFRAN-ODT) 8 MG disintegrating tablet       Sig: Place 1 tablet on the tongue Every 8 (Eight) Hours As Needed.        Pharmacy where request should be sent: [facility] - [place], [place] - [address] [facility] [phone] [facility] [phone]      Last office visit with prescribing clinician: 1/24/2025   Last telemedicine visit with prescribing clinician: Visit date not found   Next office visit with prescribing clinician: 4/25/2025     Additional details provided by patient:         Merry Tatum, PCT   01/28/25 14:07 EST

## 2025-01-28 NOTE — TELEPHONE ENCOUNTER
Caller: TORYCLYDE    Relationship: Emergency Contact    Best call back number: [phone]      Requested Prescriptions:   Requested Prescriptions     Pending Prescriptions Disp Refills    Glucagon (Baqsimi One Pack) 3 MG/DOSE powder 1 each 1     Sig: Administer 3 mg into the nostril(s) as directed by provider As Needed (hypoglycemia).        Pharmacy where request should be sent: [facility] - [place], [place] - [address] [facility] - [phone] [facility] [phone]      Last office visit with prescribing clinician: 1/24/2025   Last telemedicine visit with prescribing clinician: Visit date not found   Next office visit with prescribing clinician: 4/25/2025     Additional details provided by patient:     PATIENT HAS NOT RECEIVED THIS. WONDERING IF WE CAN MAKE SURE IT WAS SENT TO [facility] Tatum, PCT   01/28/25 14:10 EST

## 2025-02-04 RX ORDER — METFORMIN HYDROCHLORIDE 500 MG/1
1000 TABLET, EXTENDED RELEASE ORAL 2 TIMES DAILY
Qty: 180 TABLET | Refills: 0 | Status: SHIPPED | OUTPATIENT
Start: 2025-02-04

## 2025-02-10 DIAGNOSIS — Z79.891 LONG TERM PRESCRIPTION OPIATE USE: ICD-10-CM

## 2025-02-10 RX ORDER — HYDROCODONE BITARTRATE AND ACETAMINOPHEN 7.5; 325 MG/1; MG/1
1 TABLET ORAL EVERY 6 HOURS PRN
Qty: 120 TABLET | Refills: 0 | Status: SHIPPED | OUTPATIENT
Start: 2025-02-10

## 2025-02-10 NOTE — TELEPHONE ENCOUNTER
Caller: CLYDE SPEARS    Relationship: Emergency Contact    Best call back number: [phone]     Requested Prescriptions:   Requested Prescriptions     Pending Prescriptions Disp Refills    HYDROcodone-acetaminophen (NORCO) 7.5-325 MG per tablet 120 tablet 0     Sig: Take 1 tablet by mouth Every 6 (Six) Hours As Needed for Moderate Pain.        Pharmacy where request should be sent: [facility] - [place], [place] - [address] [facility] - [phone] [facility] [phone]      Last office visit with prescribing clinician: 1/24/2025   Last telemedicine visit with prescribing clinician: Visit date not found   Next office visit with prescribing clinician: 4/25/2025     Additional details provided by patient: PATIENT HAS A DAY'S SUPPLY OF MEDICINE    Does the patient have less than a 3 day supply:  [x] Yes  [] No        Hermelindo Wheat Rep   02/10/25 09:27 EST

## 2025-02-28 RX ORDER — TRAZODONE HYDROCHLORIDE 100 MG/1
200 TABLET ORAL EVERY EVENING
Qty: 180 TABLET | Refills: 1 | Status: SHIPPED | OUTPATIENT
Start: 2025-02-28

## 2025-03-10 ENCOUNTER — TELEPHONE (OUTPATIENT)
Dept: FAMILY MEDICINE CLINIC | Facility: CLINIC | Age: 65
End: 2025-03-10
Payer: MEDICARE

## 2025-03-10 DIAGNOSIS — E11.65 UNCONTROLLED TYPE 2 DIABETES MELLITUS WITH HYPERGLYCEMIA: ICD-10-CM

## 2025-03-10 RX ORDER — METFORMIN HYDROCHLORIDE 500 MG/1
1000 TABLET, EXTENDED RELEASE ORAL 2 TIMES DAILY
Qty: 180 TABLET | Refills: 0 | Status: ON HOLD | OUTPATIENT
Start: 2025-03-10

## 2025-03-10 RX ORDER — HYDROCODONE BITARTRATE AND ACETAMINOPHEN 7.5; 325 MG/1; MG/1
1 TABLET ORAL EVERY 6 HOURS PRN
Qty: 120 TABLET | Refills: 0 | Status: SHIPPED | OUTPATIENT
Start: 2025-03-10 | End: 2025-03-17 | Stop reason: SDUPTHER

## 2025-03-10 NOTE — TELEPHONE ENCOUNTER
Caller: CLYDE SPEARS    Relationship: Emergency Contact    Best call back number:     [phone]       Requested Prescriptions:     HYDROcodone-acetaminophen (NORCO) 7.5-325 MG per tablet     Pharmacy where request should be sent: [facility] - [place], [place] - [address] [facility] [phone]  - [phone] FX     Last office visit with prescribing clinician: 1/24/2025   Last telemedicine visit with prescribing clinician: Visit date not found   Next office visit with prescribing clinician: 4/25/2025     Additional details provided by patient:     Does the patient have less than a 3 day supply:  [x] Yes  [] No    Would you like a call back once the refill request has been completed: [] Yes [] No    If the office needs to give you a call back, can they leave a voicemail: [] Yes [] No    Hermelindo Reid Rep   03/10/25 10:16 EDT

## 2025-03-17 ENCOUNTER — APPOINTMENT (OUTPATIENT)
Dept: CT IMAGING | Facility: HOSPITAL | Age: 65
DRG: 516 | End: 2025-03-17
Payer: MEDICARE

## 2025-03-17 ENCOUNTER — HOSPITAL ENCOUNTER (INPATIENT)
Facility: HOSPITAL | Age: 65
LOS: 5 days | Discharge: HOME | DRG: 516 | End: 2025-03-22
Attending: FAMILY MEDICINE | Admitting: INTERNAL MEDICINE
Payer: MEDICARE

## 2025-03-17 ENCOUNTER — APPOINTMENT (OUTPATIENT)
Dept: MRI IMAGING | Facility: HOSPITAL | Age: 65
DRG: 516 | End: 2025-03-17
Payer: MEDICARE

## 2025-03-17 DIAGNOSIS — S32.030A COMPRESSION FRACTURE OF L3 LUMBAR VERTEBRA, CLOSED, INITIAL ENCOUNTER: Primary | ICD-10-CM

## 2025-03-17 DIAGNOSIS — S32.050A COMPRESSION FRACTURE OF L5 LUMBAR VERTEBRA, CLOSED, INITIAL ENCOUNTER: ICD-10-CM

## 2025-03-17 DIAGNOSIS — S32.030D COMPRESSION FRACTURE OF L3 VERTEBRA WITH ROUTINE HEALING, SUBSEQUENT ENCOUNTER: ICD-10-CM

## 2025-03-17 PROBLEM — S32.000A LUMBAR COMPRESSION FRACTURE: Status: ACTIVE | Noted: 2025-03-17

## 2025-03-17 LAB
ALBUMIN SERPL-MCNC: 4.3 G/DL (ref 3.5–5.2)
ALBUMIN/GLOB SERPL: 1.3 G/DL
ALP SERPL-CCNC: 72 U/L (ref 39–117)
ALT SERPL W P-5'-P-CCNC: 8 U/L (ref 1–41)
ANION GAP SERPL CALCULATED.3IONS-SCNC: 15.7 MMOL/L (ref 5–15)
AST SERPL-CCNC: 18 U/L (ref 1–40)
BASOPHILS # BLD AUTO: 0.07 10*3/MM3 (ref 0–0.2)
BASOPHILS NFR BLD AUTO: 0.8 % (ref 0–1.5)
BILIRUB SERPL-MCNC: 0.4 MG/DL (ref 0–1.2)
BUN SERPL-MCNC: 13 MG/DL (ref 8–23)
BUN/CREAT SERPL: 14 (ref 7–25)
CALCIUM SPEC-SCNC: 10.1 MG/DL (ref 8.6–10.5)
CHLORIDE SERPL-SCNC: 100 MMOL/L (ref 98–107)
CO2 SERPL-SCNC: 24.3 MMOL/L (ref 22–29)
CREAT SERPL-MCNC: 0.93 MG/DL (ref 0.76–1.27)
DEPRECATED RDW RBC AUTO: 43.1 FL (ref 37–54)
EGFRCR SERPLBLD CKD-EPI 2021: 91.1 ML/MIN/1.73
EOSINOPHIL # BLD AUTO: 0.24 10*3/MM3 (ref 0–0.4)
EOSINOPHIL NFR BLD AUTO: 2.6 % (ref 0.3–6.2)
ERYTHROCYTE [DISTWIDTH] IN BLOOD BY AUTOMATED COUNT: 13.8 % (ref 12.3–15.4)
GLOBULIN UR ELPH-MCNC: 3.4 GM/DL
GLUCOSE BLDC GLUCOMTR-MCNC: 172 MG/DL (ref 70–105)
GLUCOSE SERPL-MCNC: 174 MG/DL (ref 65–99)
HCT VFR BLD AUTO: 42.4 % (ref 37.5–51)
HGB BLD-MCNC: 13.5 G/DL (ref 13–17.7)
IMM GRANULOCYTES # BLD AUTO: 0.05 10*3/MM3 (ref 0–0.05)
IMM GRANULOCYTES NFR BLD AUTO: 0.5 % (ref 0–0.5)
LYMPHOCYTES # BLD AUTO: 2.23 10*3/MM3 (ref 0.7–3.1)
LYMPHOCYTES NFR BLD AUTO: 24 % (ref 19.6–45.3)
MCH RBC QN AUTO: 27.5 PG (ref 26.6–33)
MCHC RBC AUTO-ENTMCNC: 31.8 G/DL (ref 31.5–35.7)
MCV RBC AUTO: 86.4 FL (ref 79–97)
MONOCYTES # BLD AUTO: 0.68 10*3/MM3 (ref 0.1–0.9)
MONOCYTES NFR BLD AUTO: 7.3 % (ref 5–12)
NEUTROPHILS NFR BLD AUTO: 6.02 10*3/MM3 (ref 1.7–7)
NEUTROPHILS NFR BLD AUTO: 64.8 % (ref 42.7–76)
NRBC BLD AUTO-RTO: 0 /100 WBC (ref 0–0.2)
PLATELET # BLD AUTO: 390 10*3/MM3 (ref 140–450)
PMV BLD AUTO: 9.9 FL (ref 6–12)
POTASSIUM SERPL-SCNC: 4 MMOL/L (ref 3.5–5.2)
PROT SERPL-MCNC: 7.7 G/DL (ref 6–8.5)
RBC # BLD AUTO: 4.91 10*6/MM3 (ref 4.14–5.8)
SODIUM SERPL-SCNC: 140 MMOL/L (ref 136–145)
WBC NRBC COR # BLD AUTO: 9.29 10*3/MM3 (ref 3.4–10.8)

## 2025-03-17 PROCEDURE — 63710000001 INSULIN LISPRO (HUMAN) PER 5 UNITS: Performed by: NURSE PRACTITIONER

## 2025-03-17 PROCEDURE — A9579 GAD-BASE MR CONTRAST NOS,1ML: HCPCS

## 2025-03-17 PROCEDURE — 80053 COMPREHEN METABOLIC PANEL: CPT

## 2025-03-17 PROCEDURE — 25810000003 SODIUM CHLORIDE 0.9 % SOLUTION: Performed by: NURSE PRACTITIONER

## 2025-03-17 PROCEDURE — 63710000001 ONDANSETRON ODT 4 MG TABLET DISPERSIBLE

## 2025-03-17 PROCEDURE — 70450 CT HEAD/BRAIN W/O DYE: CPT

## 2025-03-17 PROCEDURE — 25010000002 HYDROMORPHONE PER 4 MG: Performed by: NURSE PRACTITIONER

## 2025-03-17 PROCEDURE — 72158 MRI LUMBAR SPINE W/O & W/DYE: CPT

## 2025-03-17 PROCEDURE — 25010000002 GADOTERIDOL PER 1 ML

## 2025-03-17 PROCEDURE — 99285 EMERGENCY DEPT VISIT HI MDM: CPT

## 2025-03-17 PROCEDURE — 82948 REAGENT STRIP/BLOOD GLUCOSE: CPT

## 2025-03-17 PROCEDURE — 85025 COMPLETE CBC W/AUTO DIFF WBC: CPT

## 2025-03-17 PROCEDURE — 25010000002 HYDROMORPHONE 1 MG/ML SOLUTION

## 2025-03-17 RX ORDER — BISACODYL 5 MG/1
5 TABLET, DELAYED RELEASE ORAL DAILY PRN
Status: DISCONTINUED | OUTPATIENT
Start: 2025-03-17 | End: 2025-03-22 | Stop reason: HOSPADM

## 2025-03-17 RX ORDER — DAPAGLIFLOZIN 10 MG/1
10 TABLET, FILM COATED ORAL DAILY
COMMUNITY

## 2025-03-17 RX ORDER — TAMSULOSIN HYDROCHLORIDE 0.4 MG/1
2 CAPSULE ORAL DAILY
COMMUNITY

## 2025-03-17 RX ORDER — BISACODYL 10 MG
10 SUPPOSITORY, RECTAL RECTAL DAILY PRN
Status: DISCONTINUED | OUTPATIENT
Start: 2025-03-17 | End: 2025-03-22 | Stop reason: HOSPADM

## 2025-03-17 RX ORDER — IBUPROFEN 600 MG/1
1 TABLET ORAL
Status: DISCONTINUED | OUTPATIENT
Start: 2025-03-17 | End: 2025-03-22 | Stop reason: HOSPADM

## 2025-03-17 RX ORDER — ONDANSETRON 2 MG/ML
4 INJECTION INTRAMUSCULAR; INTRAVENOUS EVERY 6 HOURS PRN
Status: DISCONTINUED | OUTPATIENT
Start: 2025-03-17 | End: 2025-03-22 | Stop reason: HOSPADM

## 2025-03-17 RX ORDER — TAMSULOSIN HYDROCHLORIDE 0.4 MG/1
0.8 CAPSULE ORAL DAILY
Status: DISCONTINUED | OUTPATIENT
Start: 2025-03-18 | End: 2025-03-22 | Stop reason: HOSPADM

## 2025-03-17 RX ORDER — SODIUM CHLORIDE 0.9 % (FLUSH) 0.9 %
10 SYRINGE (ML) INJECTION EVERY 12 HOURS SCHEDULED
Status: DISCONTINUED | OUTPATIENT
Start: 2025-03-17 | End: 2025-03-22 | Stop reason: HOSPADM

## 2025-03-17 RX ORDER — INSULIN GLARGINE 100 [IU]/ML
70 INJECTION, SOLUTION SUBCUTANEOUS EVERY MORNING
COMMUNITY

## 2025-03-17 RX ORDER — SODIUM CHLORIDE 0.9 % (FLUSH) 0.9 %
10 SYRINGE (ML) INJECTION AS NEEDED
Status: DISCONTINUED | OUTPATIENT
Start: 2025-03-17 | End: 2025-03-22 | Stop reason: HOSPADM

## 2025-03-17 RX ORDER — HYDROMORPHONE HYDROCHLORIDE 1 MG/ML
0.5 INJECTION, SOLUTION INTRAMUSCULAR; INTRAVENOUS; SUBCUTANEOUS
Status: DISCONTINUED | OUTPATIENT
Start: 2025-03-17 | End: 2025-03-19

## 2025-03-17 RX ORDER — SODIUM CHLORIDE 9 MG/ML
40 INJECTION, SOLUTION INTRAVENOUS AS NEEDED
Status: DISCONTINUED | OUTPATIENT
Start: 2025-03-17 | End: 2025-03-22 | Stop reason: HOSPADM

## 2025-03-17 RX ORDER — NITROGLYCERIN 0.4 MG/1
0.4 TABLET SUBLINGUAL
Status: DISCONTINUED | OUTPATIENT
Start: 2025-03-17 | End: 2025-03-22 | Stop reason: HOSPADM

## 2025-03-17 RX ORDER — AMOXICILLIN 250 MG
2 CAPSULE ORAL 2 TIMES DAILY PRN
Status: DISCONTINUED | OUTPATIENT
Start: 2025-03-17 | End: 2025-03-22 | Stop reason: HOSPADM

## 2025-03-17 RX ORDER — NALOXONE HCL 0.4 MG/ML
0.4 VIAL (ML) INJECTION
Status: DISCONTINUED | OUTPATIENT
Start: 2025-03-17 | End: 2025-03-19

## 2025-03-17 RX ORDER — AMLODIPINE BESYLATE 5 MG/1
10 TABLET ORAL
Status: DISCONTINUED | OUTPATIENT
Start: 2025-03-18 | End: 2025-03-22 | Stop reason: HOSPADM

## 2025-03-17 RX ORDER — ACETAMINOPHEN 325 MG/1
650 TABLET ORAL EVERY 4 HOURS PRN
Status: DISCONTINUED | OUTPATIENT
Start: 2025-03-17 | End: 2025-03-22 | Stop reason: HOSPADM

## 2025-03-17 RX ORDER — DEXTROSE MONOHYDRATE 25 G/50ML
25 INJECTION, SOLUTION INTRAVENOUS
Status: DISCONTINUED | OUTPATIENT
Start: 2025-03-17 | End: 2025-03-22 | Stop reason: HOSPADM

## 2025-03-17 RX ORDER — POLYETHYLENE GLYCOL 3350 17 G/17G
17 POWDER, FOR SOLUTION ORAL DAILY PRN
Status: DISCONTINUED | OUTPATIENT
Start: 2025-03-17 | End: 2025-03-22 | Stop reason: HOSPADM

## 2025-03-17 RX ORDER — INSULIN LISPRO 100 [IU]/ML
2-7 INJECTION, SOLUTION INTRAVENOUS; SUBCUTANEOUS
Status: DISCONTINUED | OUTPATIENT
Start: 2025-03-17 | End: 2025-03-22 | Stop reason: HOSPADM

## 2025-03-17 RX ORDER — ONDANSETRON 4 MG/1
4 TABLET, ORALLY DISINTEGRATING ORAL ONCE
Status: COMPLETED | OUTPATIENT
Start: 2025-03-17 | End: 2025-03-17

## 2025-03-17 RX ORDER — SODIUM CHLORIDE 9 MG/ML
75 INJECTION, SOLUTION INTRAVENOUS CONTINUOUS
Status: DISCONTINUED | OUTPATIENT
Start: 2025-03-17 | End: 2025-03-18

## 2025-03-17 RX ORDER — OXYCODONE HYDROCHLORIDE 5 MG/1
5 TABLET ORAL EVERY 4 HOURS PRN
Refills: 0 | Status: DISCONTINUED | OUTPATIENT
Start: 2025-03-17 | End: 2025-03-19

## 2025-03-17 RX ORDER — PANTOPRAZOLE SODIUM 40 MG/10ML
40 INJECTION, POWDER, LYOPHILIZED, FOR SOLUTION INTRAVENOUS
Status: DISCONTINUED | OUTPATIENT
Start: 2025-03-17 | End: 2025-03-22 | Stop reason: HOSPADM

## 2025-03-17 RX ORDER — INSULIN GLARGINE 100 [IU]/ML
60 INJECTION, SOLUTION SUBCUTANEOUS EVERY EVENING
COMMUNITY

## 2025-03-17 RX ORDER — CARVEDILOL 6.25 MG/1
12.5 TABLET ORAL 2 TIMES DAILY WITH MEALS
Status: DISCONTINUED | OUTPATIENT
Start: 2025-03-18 | End: 2025-03-22 | Stop reason: HOSPADM

## 2025-03-17 RX ORDER — DULOXETIN HYDROCHLORIDE 30 MG/1
60 CAPSULE, DELAYED RELEASE ORAL 2 TIMES DAILY
Status: DISCONTINUED | OUTPATIENT
Start: 2025-03-18 | End: 2025-03-22 | Stop reason: HOSPADM

## 2025-03-17 RX ORDER — FINASTERIDE 5 MG/1
5 TABLET, FILM COATED ORAL DAILY
Status: DISCONTINUED | OUTPATIENT
Start: 2025-03-18 | End: 2025-03-22 | Stop reason: HOSPADM

## 2025-03-17 RX ORDER — ATORVASTATIN CALCIUM 20 MG/1
20 TABLET, FILM COATED ORAL DAILY
Status: DISCONTINUED | OUTPATIENT
Start: 2025-03-18 | End: 2025-03-22 | Stop reason: HOSPADM

## 2025-03-17 RX ORDER — NICOTINE POLACRILEX 4 MG
15 LOZENGE BUCCAL
Status: DISCONTINUED | OUTPATIENT
Start: 2025-03-17 | End: 2025-03-22 | Stop reason: HOSPADM

## 2025-03-17 RX ADMIN — ONDANSETRON 4 MG: 4 TABLET, ORALLY DISINTEGRATING ORAL at 15:38

## 2025-03-17 RX ADMIN — HYDROMORPHONE HYDROCHLORIDE 0.5 MG: 1 INJECTION, SOLUTION INTRAMUSCULAR; INTRAVENOUS; SUBCUTANEOUS at 17:56

## 2025-03-17 RX ADMIN — HYDROMORPHONE HYDROCHLORIDE 0.5 MG: 1 INJECTION, SOLUTION INTRAMUSCULAR; INTRAVENOUS; SUBCUTANEOUS at 23:17

## 2025-03-17 RX ADMIN — OXYCODONE HYDROCHLORIDE 5 MG: 5 TABLET ORAL at 20:32

## 2025-03-17 RX ADMIN — SODIUM CHLORIDE 75 ML/HR: 9 INJECTION, SOLUTION INTRAVENOUS at 21:24

## 2025-03-17 RX ADMIN — GADOTERIDOL 20 ML: 279.3 INJECTION, SOLUTION INTRAVENOUS at 17:19

## 2025-03-17 RX ADMIN — INSULIN LISPRO 2 UNITS: 100 INJECTION, SOLUTION INTRAVENOUS; SUBCUTANEOUS at 21:03

## 2025-03-17 RX ADMIN — PANTOPRAZOLE SODIUM 40 MG: 40 INJECTION, POWDER, LYOPHILIZED, FOR SOLUTION INTRAVENOUS at 20:33

## 2025-03-17 RX ADMIN — HYDROMORPHONE HYDROCHLORIDE 0.5 MG: 1 INJECTION, SOLUTION INTRAMUSCULAR; INTRAVENOUS; SUBCUTANEOUS at 15:39

## 2025-03-17 RX ADMIN — HYDROMORPHONE HYDROCHLORIDE 0.5 MG: 1 INJECTION, SOLUTION INTRAMUSCULAR; INTRAVENOUS; SUBCUTANEOUS at 20:24

## 2025-03-17 NOTE — LETTER
March 21, 2025     Patient: Matthieu Hawk   YOB: 1960   Date of Visit: 3/17/2025-admit date       To Whom It May Concern:    It is my medical opinion that Matthieu Hawk  is a patient at [facility] and was admitted on 3/17. Today, 3/21, Mr. Hawk underwent a kyphoidplasty . If you have any questions about these dates or the procedure that took place, please don't hesitate to reach out.      Sincerely,  Catherine Zaidi RN

## 2025-03-17 NOTE — ED PROVIDER NOTES
Subjective     Chief Complaint   Patient presents with    Back Pain     History of Present Illness    Chief complaint: Pain from a fall    Context: Patient is a 64-year-old male with history of hypertension, CVA, diabetes, and chronic musculoskeletal pain who presents to the emergency department with increasing pain from a fall 4 days prior.  Patient was working on a standard utility trailer on 3/13/2025 and states that he fell to the ground.  Patient is unsure of any LOC, patient is unable to define if there was any dizziness or lightheadedness that preceded the fall; patient just states that he remembers being on the trailer and then remembers being on the ground.  Patient had extensive scans on 3/13/2025, including x-rays and CTs; see chart review in [facility] for more details.  Patient describes pain today as being in his lower back going down into his right hip and his right leg, left leg is also affected, but patient states that he cannot move his right leg at this time due to weakness and pain.  Patient has had multiple episodes of urinary incontinence since the fall; states he had never had an episode previously.  Patient denies any other complaints at this time; no chest pain, no abdominal pain, no shortness of breath.    PCP: Mustapha Barlow DO    Review of Systems    Past Medical History:   Diagnosis Date    Chronic back pain     Depression     Diabetes mellitus     Hypertension     Stroke        Allergies   Allergen Reactions    Gabapentin Other (See Comments)     Severe shaking    Lyrica [Pregabalin] Other (See Comments)     Severe shaking    Codeine Itching and Rash     Past Surgical History:   Procedure Laterality Date    BONE SPUR LEG      CARPAL TUNNEL RELEASE Bilateral     RETINAL DETACHMENT REPAIR Bilateral     SINUS SURGERY       No family history on file.    Social History     Socioeconomic History    Marital status:    Tobacco Use    Smoking status: Former     Current packs/day: 0.00      "Average packs/day: 2.0 packs/day for 18.0 years (36.0 ttl pk-yrs)     Types: Cigarettes     Start date:      Quit date:      Years since quittin.2     Passive exposure: Never    Smokeless tobacco: Never   Vaping Use    Vaping status: Never Used   Substance and Sexual Activity    Alcohol use: Yes    Drug use: Not Currently    Sexual activity: Defer     Objective   Physical Exam    Procedures     ED Course  ED Course as of 03/17/25 2219   Mon Mar 17, 2025   1752 Waiting for MRI read. Additional pain medication given. [RS]   1843 Dr. Martínez called; said findings sound benign [RS]   190 Vene admitted [RS]      ED Course User Index  [RS] Sadi Lewis, PADodieC      /67   Pulse 107   Temp 97.9 °F (36.6 °C) (Oral)   Resp 22   Ht 177.8 cm (70\")   Wt (!) 141 kg (310 lb)   SpO2 94%   BMI 44.48 kg/m²   Labs Reviewed   COMPREHENSIVE METABOLIC PANEL - Abnormal; Notable for the following components:       Result Value    Glucose 174 (*)     Anion Gap 15.7 (*)     All other components within normal limits    Narrative:     GFR Categories in Chronic Kidney Disease (CKD)      GFR Category          GFR (mL/min/1.73)    Interpretation  G1                     90 or greater         Normal or high (1)  G2                      60-89                Mild decrease (1)  G3a                   45-59                Mild to moderate decrease  G3b                   30-44                Moderate to severe decrease  G4                    15-29                Severe decrease  G5                    14 or less           Kidney failure          (1)In the absence of evidence of kidney disease, neither GFR category G1 or G2 fulfill the criteria for CKD.    eGFR calculation  CKD-EPI creatinine equation, which does not include race as a factor   POCT GLUCOSE FINGERSTICK - Abnormal; Notable for the following components:    Glucose 172 (*)     All other components within normal limits   CBC WITH AUTO DIFFERENTIAL - Normal "   BASIC METABOLIC PANEL   MAGNESIUM   CBC WITH AUTO DIFFERENTIAL   POCT GLUCOSE FINGERSTICK   POCT GLUCOSE FINGERSTICK   POCT GLUCOSE FINGERSTICK   POCT GLUCOSE FINGERSTICK   POCT GLUCOSE FINGERSTICK   TYPE AND SCREEN   CBC AND DIFFERENTIAL    Narrative:     The following orders were created for panel order CBC & Differential.  Procedure                               Abnormality         Status                     ---------                               -----------         ------                     CBC Auto Differential[[id number]]        Normal              Final result                 Please view results for these tests on the individual orders.   CBC AND DIFFERENTIAL    Narrative:     The following orders were created for panel order CBC & Differential.  Procedure                               Abnormality         Status                     ---------                               -----------         ------                     CBC Auto Differential[[id number]]                                                         Please view results for these tests on the individual orders.     Medications   sodium chloride 0.9 % flush 10 mL (has no administration in time range)   sodium chloride 0.9 % flush 10 mL (has no administration in time range)   sodium chloride 0.9 % flush 10 mL (has no administration in time range)   sodium chloride 0.9 % infusion 40 mL (has no administration in time range)   dextrose (GLUTOSE) oral gel 15 g (has no administration in time range)   dextrose (D50W) (25 g/50 mL) IV injection 25 g (has no administration in time range)   glucagon (GLUCAGEN) injection 1 mg (has no administration in time range)   insulin lispro (HUMALOG/ADMELOG) injection 2-7 Units (2 Units Subcutaneous Given 3/17/25 2103)   nitroglycerin (NITROSTAT) SL tablet 0.4 mg (has no administration in time range)   sodium chloride 0.9 % infusion (75 mL/hr Intravenous New Bag 3/17/25 2124)   acetaminophen (TYLENOL) tablet 650 mg (has no  administration in time range)   HYDROmorphone (DILAUDID) injection 0.5 mg (0.5 mg Intravenous Given 3/17/25 2024)     And   naloxone (NARCAN) injection 0.4 mg (has no administration in time range)   oxyCODONE (ROXICODONE) immediate release tablet 5 mg (5 mg Oral Given 3/17/25 2032)   sennosides-docusate (PERICOLACE) 8.6-50 MG per tablet 2 tablet (has no administration in time range)     And   polyethylene glycol (MIRALAX) packet 17 g (has no administration in time range)     And   bisacodyl (DULCOLAX) EC tablet 5 mg (has no administration in time range)     And   bisacodyl (DULCOLAX) suppository 10 mg (has no administration in time range)   ondansetron (ZOFRAN) injection 4 mg (has no administration in time range)   pantoprazole (PROTONIX) injection 40 mg (40 mg Intravenous Given 3/17/25 2033)   ondansetron ODT (ZOFRAN-ODT) disintegrating tablet 4 mg (4 mg Oral Given 3/17/25 1538)   HYDROmorphone (DILAUDID) injection 0.5 mg (0.5 mg Intravenous Given 3/17/25 1539)   gadoteridol (PROHANCE) injection 20 mL (20 mL Intravenous Given 3/17/25 1719)   HYDROmorphone (DILAUDID) injection 0.5 mg (0.5 mg Intravenous Given 3/17/25 1756)     CT Head Without Contrast  Result Date: 3/17/2025  Impression: No acute intracranial pathology. Electronically Signed: Zenon Rodriguez MD  3/17/2025 6:06 PM EDT  Workstation ID: [id number]    MRI Lumbar Spine With & Without Contrast  Result Date: 3/17/2025  Impression: Interval acute/subacute mild compression fractures at the inferior endplate of L3 and the superior endplate of L5 without significant osseous retropulsion. Increased prominence of the ventral epidural space from L3-4 through the sacrum, suspect mostly combination of epidural fat and prominence of epidural venous plexus though a small superimposed epidural hematoma in setting of trauma is not excluded. No significant interval change in multilevel degenerative changes, most pronounced at L3-4, where there is moderate spinal canal  "stenosis and moderate to severe bilateral foraminal narrowing. Electronically Signed: Parveen Ramirez  3/17/2025 5:52 PM EDT  Workstation ID: [id number]                                                     Medical Decision Making  /83   Pulse 109   Temp 97.9 °F (36.6 °C) (Oral)   Resp 22   Ht 177.8 cm (70\")   Wt (!) 141 kg (310 lb)   SpO2 93%   BMI 44.48 kg/m²      Chart review: All of patient's scans from 3/13/2025 reviewed.  Patient had x-rays on left hip, left femur, left knee, left tib-fib, and CXR.  CTs were also completed on the patient's cervical, thoracic, and lumbar spine as well as a CTA of the patient's chest.  All of the scans had unremarkable findings; no acute fractures, dislocations, or other emergent results.    Patient is a 64-year-old male who presents the emergency department complaining of pain after a fall.  See full HPI above.    Primary assessment and initial physical exam noted above.    Patient is placed into ED bed and into a gown for assessment.  IV access is established for labs and medication administration if indicated.  Primary differentials for patient include lumbar strain, lumbar sacral fracture, cauda equina, sacral contusion.    Comorbidities:  has a past medical history of Chronic back pain, Depression, Diabetes mellitus, Hypertension, and Stroke.    Discussion with provider: Dr. Daren Stokes    Radiology interpretation: Imaging reviewed by radiologist, Dr. Stokes, and myself  CT Head Without Contrast  Result Date: 3/17/2025  Impression: No acute intracranial pathology. Electronically Signed: Zenon Rodriguez MD  3/17/2025 6:06 PM EDT  Workstation ID: [id number]    MRI Lumbar Spine With & Without Contrast  Result Date: 3/17/2025  Impression: Interval acute/subacute mild compression fractures at the inferior endplate of L3 and the superior endplate of L5 without significant osseous retropulsion. Increased prominence of the ventral epidural space from L3-4 through the sacrum, " suspect mostly combination of epidural fat and prominence of epidural venous plexus though a small superimposed epidural hematoma in setting of trauma is not excluded. No significant interval change in multilevel degenerative changes, most pronounced at L3-4, where there is moderate spinal canal stenosis and moderate to severe bilateral foraminal narrowing. Electronically Signed: Parveen Ramirez  3/17/2025 5:52 PM EDT  Workstation ID: [id number]    Dr. Martínez with neurosurgery consulted.  He seems to think that the patient's urinary incontinence is not related to the patient's injuries from the fall and states that the MRI findings are generally benign.    Lab interpretation: Patient labs remarkable for elevated blood sugar on both POC glucose and on CMP.  Other labs unremarkable with no electrolyte derangement or kidney injury on CMP and no leukocytosis or acute on CBC.    Patient and wife informed of all results and of plan of care.  Repeat physical exam completed at this time; patient is restful at this time as opposed to previous exam when he was in extreme pain and was in acute distress.  Other physical findings unchanged from previous.  Patient and wife informed of the plan to admit him to the hospital and to have a physical therapy and Occupational Therapy consult tomorrow.  Patient and wife told that the neurosurgeon was consulted and did not seem to think there was any interventions to be performed, but he was consulted and did review the case with the ED team.  Patient and wife verbalized understanding of and are amenable to this course of action.  Patient will be admitted to the hospitalist service under Dr. Felix.  Hospitalist MICHELLE Vena was given report prior to patient leaving the ED.    Appropriate PPE worn during exam.    I discussed findings with patient who voiced understanding of discharge instructions, signs and symptoms requiring return to ED; discharged improved and in stable condition with  follow up for re-evaluation.  This document is intended for medical expert use only. Reading of this document by patients and/or patient's family without participating medical staff guidance may result in misinterpretation and unintended morbidity.  Any interpretation of such data is the responsibility of the patient and/or family member responsible for the patient in concert with their primary or specialist providers, not to be left for sources of online searches such as [employer], [employer] or similar queries. Relying on these approaches to knowledge may result in misinterpretation, misguided goals of care and even death should patients or family members try recommendations outside of the realm of professional medical care in a supervised inpatient environment.         Problems Addressed:  Acute low back pain without sciatica, unspecified back pain laterality: complicated acute illness or injury  Fall, initial encounter: complicated acute illness or injury  Other closed fracture of third lumbar vertebra, initial encounter: complicated acute illness or injury  Pain in both lower extremities: complicated acute illness or injury    Amount and/or Complexity of Data Reviewed  Labs: ordered.  Radiology: ordered.    Risk  Prescription drug management.  Decision regarding hospitalization.        Final diagnoses:   Fall, initial encounter   Other closed fracture of third lumbar vertebra, initial encounter   Acute low back pain without sciatica, unspecified back pain laterality   Pain in both lower extremities       ED Disposition  ED Disposition       ED Disposition   Decision to Admit    Condition   --    Comment   Level of Care: Telemetry [5]   Diagnosis: Lumbar compression fracture [022498]   Admitting Physician: CHET WALLACE [[id number]]   Attending Physician: CHET WALLACE [[id number]]   Certification: I Certify That Inpatient Hospital Services Are Medically Necessary For Greater Than 2 Midnights                 No follow-up provider  specified.       Medication List        ASK your doctor about these medications      dapagliflozin Propanediol 10 MG tablet  Ask about: Which instructions should I use?     HYDROcodone-acetaminophen 7.5-325 MG per tablet  Commonly known as: NORCO  Take 1 tablet by mouth Every 6 (Six) Hours As Needed for Moderate Pain.  Ask about: Which instructions should I use?     * Lantus SoloStar 100 UNIT/ML injection pen  Generic drug: Insulin Glargine  Ask about: Which instructions should I use?     * Lantus SoloStar 100 UNIT/ML injection pen  Generic drug: Insulin Glargine  Ask about: Which instructions should I use?           * This list has 2 medication(s) that are the same as other medications prescribed for you. Read the directions carefully, and ask your doctor or other care provider to review them with you.                     Sadi Lewis PA-C  03/17/25 [id number]

## 2025-03-17 NOTE — Clinical Note
Level of Care: Med/Surg [1]   Admitting Physician: VONNIE PETTY [[id number]]   Attending Physician: VONNIE PETTY [[id number]]

## 2025-03-18 PROBLEM — S32.050A: Status: ACTIVE | Noted: 2025-03-17

## 2025-03-18 PROBLEM — S32.030A COMPRESSION FRACTURE OF L3 LUMBAR VERTEBRA, CLOSED, INITIAL ENCOUNTER: Status: ACTIVE | Noted: 2025-03-17

## 2025-03-18 LAB
ABO GROUP BLD: NORMAL
ANION GAP SERPL CALCULATED.3IONS-SCNC: 10.6 MMOL/L (ref 5–15)
BASOPHILS # BLD AUTO: 0.05 10*3/MM3 (ref 0–0.2)
BASOPHILS NFR BLD AUTO: 0.4 % (ref 0–1.5)
BLD GP AB SCN SERPL QL: NEGATIVE
BUN SERPL-MCNC: 14 MG/DL (ref 8–23)
BUN/CREAT SERPL: 14.1 (ref 7–25)
CALCIUM SPEC-SCNC: 9.2 MG/DL (ref 8.6–10.5)
CHLORIDE SERPL-SCNC: 101 MMOL/L (ref 98–107)
CO2 SERPL-SCNC: 27.4 MMOL/L (ref 22–29)
CREAT SERPL-MCNC: 0.99 MG/DL (ref 0.76–1.27)
DEPRECATED RDW RBC AUTO: 44.3 FL (ref 37–54)
EGFRCR SERPLBLD CKD-EPI 2021: 84.5 ML/MIN/1.73
EOSINOPHIL # BLD AUTO: 0.16 10*3/MM3 (ref 0–0.4)
EOSINOPHIL NFR BLD AUTO: 1.3 % (ref 0.3–6.2)
ERYTHROCYTE [DISTWIDTH] IN BLOOD BY AUTOMATED COUNT: 13.9 % (ref 12.3–15.4)
GLUCOSE BLDC GLUCOMTR-MCNC: 205 MG/DL (ref 70–105)
GLUCOSE BLDC GLUCOMTR-MCNC: 234 MG/DL (ref 70–105)
GLUCOSE BLDC GLUCOMTR-MCNC: 237 MG/DL (ref 70–105)
GLUCOSE BLDC GLUCOMTR-MCNC: 275 MG/DL (ref 70–105)
GLUCOSE SERPL-MCNC: 196 MG/DL (ref 65–99)
HCT VFR BLD AUTO: 40.5 % (ref 37.5–51)
HGB BLD-MCNC: 12.8 G/DL (ref 13–17.7)
IMM GRANULOCYTES # BLD AUTO: 0.03 10*3/MM3 (ref 0–0.05)
IMM GRANULOCYTES NFR BLD AUTO: 0.3 % (ref 0–0.5)
LYMPHOCYTES # BLD AUTO: 1.38 10*3/MM3 (ref 0.7–3.1)
LYMPHOCYTES NFR BLD AUTO: 11.5 % (ref 19.6–45.3)
MAGNESIUM SERPL-MCNC: 2 MG/DL (ref 1.6–2.4)
MCH RBC QN AUTO: 27.8 PG (ref 26.6–33)
MCHC RBC AUTO-ENTMCNC: 31.6 G/DL (ref 31.5–35.7)
MCV RBC AUTO: 87.9 FL (ref 79–97)
MONOCYTES # BLD AUTO: 0.93 10*3/MM3 (ref 0.1–0.9)
MONOCYTES NFR BLD AUTO: 7.8 % (ref 5–12)
NEUTROPHILS NFR BLD AUTO: 78.7 % (ref 42.7–76)
NEUTROPHILS NFR BLD AUTO: 9.45 10*3/MM3 (ref 1.7–7)
NRBC BLD AUTO-RTO: 0 /100 WBC (ref 0–0.2)
PLATELET # BLD AUTO: 352 10*3/MM3 (ref 140–450)
PMV BLD AUTO: 9.6 FL (ref 6–12)
POTASSIUM SERPL-SCNC: 4.1 MMOL/L (ref 3.5–5.2)
RBC # BLD AUTO: 4.61 10*6/MM3 (ref 4.14–5.8)
RH BLD: POSITIVE
SODIUM SERPL-SCNC: 139 MMOL/L (ref 136–145)
T&S EXPIRATION DATE: NORMAL
WBC NRBC COR # BLD AUTO: 12 10*3/MM3 (ref 3.4–10.8)

## 2025-03-18 PROCEDURE — 82948 REAGENT STRIP/BLOOD GLUCOSE: CPT | Performed by: NURSE PRACTITIONER

## 2025-03-18 PROCEDURE — 25010000002 HYDROMORPHONE PER 4 MG: Performed by: NURSE PRACTITIONER

## 2025-03-18 PROCEDURE — 87481 CANDIDA DNA AMP PROBE: CPT | Performed by: INTERNAL MEDICINE

## 2025-03-18 PROCEDURE — 86901 BLOOD TYPING SEROLOGIC RH(D): CPT | Performed by: NURSE PRACTITIONER

## 2025-03-18 PROCEDURE — 85025 COMPLETE CBC W/AUTO DIFF WBC: CPT | Performed by: NURSE PRACTITIONER

## 2025-03-18 PROCEDURE — 86850 RBC ANTIBODY SCREEN: CPT | Performed by: NURSE PRACTITIONER

## 2025-03-18 PROCEDURE — 82948 REAGENT STRIP/BLOOD GLUCOSE: CPT

## 2025-03-18 PROCEDURE — 86900 BLOOD TYPING SEROLOGIC ABO: CPT | Performed by: NURSE PRACTITIONER

## 2025-03-18 PROCEDURE — 99221 1ST HOSP IP/OBS SF/LOW 40: CPT

## 2025-03-18 PROCEDURE — 63710000001 INSULIN LISPRO (HUMAN) PER 5 UNITS: Performed by: NURSE PRACTITIONER

## 2025-03-18 PROCEDURE — 83735 ASSAY OF MAGNESIUM: CPT | Performed by: NURSE PRACTITIONER

## 2025-03-18 PROCEDURE — 80048 BASIC METABOLIC PNL TOTAL CA: CPT | Performed by: NURSE PRACTITIONER

## 2025-03-18 PROCEDURE — 99221 1ST HOSP IP/OBS SF/LOW 40: CPT | Performed by: STUDENT IN AN ORGANIZED HEALTH CARE EDUCATION/TRAINING PROGRAM

## 2025-03-18 PROCEDURE — 63710000001 INSULIN GLARGINE PER 5 UNITS: Performed by: NURSE PRACTITIONER

## 2025-03-18 PROCEDURE — 25010000002 ONDANSETRON PER 1 MG: Performed by: NURSE PRACTITIONER

## 2025-03-18 RX ADMIN — HYDROMORPHONE HYDROCHLORIDE 0.5 MG: 1 INJECTION, SOLUTION INTRAMUSCULAR; INTRAVENOUS; SUBCUTANEOUS at 09:34

## 2025-03-18 RX ADMIN — Medication 10 ML: at 20:48

## 2025-03-18 RX ADMIN — INSULIN LISPRO 3 UNITS: 100 INJECTION, SOLUTION INTRAVENOUS; SUBCUTANEOUS at 11:38

## 2025-03-18 RX ADMIN — TAMSULOSIN HYDROCHLORIDE 0.8 MG: 0.4 CAPSULE ORAL at 09:33

## 2025-03-18 RX ADMIN — INSULIN LISPRO 3 UNITS: 100 INJECTION, SOLUTION INTRAVENOUS; SUBCUTANEOUS at 09:33

## 2025-03-18 RX ADMIN — FINASTERIDE 5 MG: 5 TABLET, FILM COATED ORAL at 09:33

## 2025-03-18 RX ADMIN — ONDANSETRON 4 MG: 2 INJECTION, SOLUTION INTRAMUSCULAR; INTRAVENOUS at 07:24

## 2025-03-18 RX ADMIN — INSULIN GLARGINE 40 UNITS: 100 INJECTION, SOLUTION SUBCUTANEOUS at 09:33

## 2025-03-18 RX ADMIN — ATORVASTATIN CALCIUM 20 MG: 20 TABLET, FILM COATED ORAL at 09:33

## 2025-03-18 RX ADMIN — Medication 10 ML: at 09:35

## 2025-03-18 RX ADMIN — INSULIN GLARGINE 40 UNITS: 100 INJECTION, SOLUTION SUBCUTANEOUS at 18:09

## 2025-03-18 RX ADMIN — PANTOPRAZOLE SODIUM 40 MG: 40 INJECTION, POWDER, LYOPHILIZED, FOR SOLUTION INTRAVENOUS at 04:38

## 2025-03-18 RX ADMIN — DULOXETINE 60 MG: 30 CAPSULE, DELAYED RELEASE ORAL at 09:38

## 2025-03-18 RX ADMIN — DULOXETINE 60 MG: 30 CAPSULE, DELAYED RELEASE ORAL at 20:48

## 2025-03-18 RX ADMIN — CARVEDILOL 12.5 MG: 6.25 TABLET, FILM COATED ORAL at 09:33

## 2025-03-18 RX ADMIN — INSULIN LISPRO 3 UNITS: 100 INJECTION, SOLUTION INTRAVENOUS; SUBCUTANEOUS at 20:48

## 2025-03-18 RX ADMIN — OXYCODONE HYDROCHLORIDE 5 MG: 5 TABLET ORAL at 19:18

## 2025-03-18 RX ADMIN — HYDROMORPHONE HYDROCHLORIDE 0.5 MG: 1 INJECTION, SOLUTION INTRAMUSCULAR; INTRAVENOUS; SUBCUTANEOUS at 16:31

## 2025-03-18 RX ADMIN — CARVEDILOL 12.5 MG: 6.25 TABLET, FILM COATED ORAL at 18:09

## 2025-03-18 RX ADMIN — HYDROMORPHONE HYDROCHLORIDE 0.5 MG: 1 INJECTION, SOLUTION INTRAMUSCULAR; INTRAVENOUS; SUBCUTANEOUS at 20:48

## 2025-03-18 RX ADMIN — HYDROMORPHONE HYDROCHLORIDE 0.5 MG: 1 INJECTION, SOLUTION INTRAMUSCULAR; INTRAVENOUS; SUBCUTANEOUS at 04:38

## 2025-03-18 RX ADMIN — AMLODIPINE BESYLATE 10 MG: 5 TABLET ORAL at 09:33

## 2025-03-18 RX ADMIN — INSULIN LISPRO 4 UNITS: 100 INJECTION, SOLUTION INTRAVENOUS; SUBCUTANEOUS at 18:09

## 2025-03-18 NOTE — SIGNIFICANT NOTE
03/18/25 0932   OTHER   Discipline occupational therapist;physical therapist   Rehab Time/Intention   Session Not Performed   (neurosx signing off no intervention, no bracing, awaiting determination of kyphoplasty, will follow up)   Recommendation   PT - Next Appointment 03/19/25   Recommendation   OT - Next Appointment 03/19/25       Per Lexus reyna chat: bedrest for OT/PT until kyphoplasty determined

## 2025-03-18 NOTE — CONSULTS
[facility] NEUROSURGERY CONSULT NOTE    Patient name: Matthieu Hawk  Referring Provider: Carmen Andersen APRN   Reason for Consultation:     lumbar compression fractures with possible small epidural hematoma       Patient Care Team:  Mustapha Barlow DO as PCP - General (Family Medicine)  Shelia Duncan RN as Ambulatory  ([facility])    Chief complaint: Low back pain    Subjective .     History of present illness:    Patient is a 64 y.o. male who presents to [facility] after falling out of a utility trailer Thursday and hurting his back.  Patient states that his low back pain progressively increased in addition to his pain, numbness/tingling and weakness in his lower extremities.  Patient got to the point where he could barely walk and decided that he needed to be evaluated in the hospital.  Patient states that his left leg is worse than his right leg in terms of his symptoms.  Patient states that the pain medication he is given in the hospital is not helping his symptoms at all.  He denies any bowel or bladder incontinence or saddle anesthesia at this time  Review of Systems  Review of Systems   Constitutional:  Positive for activity change.   Musculoskeletal:  Positive for back pain and gait problem.   Neurological:  Positive for weakness and numbness.       History  PAST MEDICAL HISTORY  Past Medical History:   Diagnosis Date    Chronic back pain     Depression     Diabetes mellitus     Hypertension     Stroke        PAST SURGICAL HISTORY  Past Surgical History:   Procedure Laterality Date    BONE SPUR LEG      CARPAL TUNNEL RELEASE Bilateral     RETINAL DETACHMENT REPAIR Bilateral     SINUS SURGERY         FAMILY HISTORY  History reviewed. No pertinent family history.    SOCIAL HISTORY  Social History     Tobacco Use    Smoking status: Former     Current packs/day: 0.00     Average packs/day: 2.0 packs/day for 18.0 years (36.0 ttl pk-yrs)     Types: Cigarettes     Start date: 1976      Quit date:      Years since quittin.2     Passive exposure: Never    Smokeless tobacco: Never   Vaping Use    Vaping status: Never Used   Substance Use Topics    Alcohol use: Yes    Drug use: Not Currently         Allergies:  Gabapentin, Lyrica [pregabalin], and Codeine    MEDICATIONS:  Medications Prior to Admission   Medication Sig Dispense Refill Last Dose/Taking    amLODIPine (NORVASC) 10 MG tablet Take 1 tablet by mouth Daily. Indications: High Blood Pressure Disorder 90 tablet 3 Taking    aspirin 81 MG chewable tablet Chew 1 tablet Daily. Indications: Disease involving Lipid Deposits in the Arteries 90 tablet 3 Taking    atorvastatin (LIPITOR) 20 MG tablet Take 1 tablet by mouth Daily. Indications: High Amount of Fats in the Blood 90 tablet 3 Taking    carvedilol (COREG) 12.5 MG tablet Take 1 tablet by mouth 2 (Two) Times a Day With Meals. Indications: High Blood Pressure Disorder 180 tablet 3 Taking    dapagliflozin Propanediol 10 MG tablet Take 10 mg by mouth Daily.   Taking    Dulaglutide 4.5 MG/0.5ML solution auto-injector Inject 4.5 mg under the skin into the appropriate area as directed 1 (One) Time Per Week. 2 mL 11 Taking    DULoxetine (CYMBALTA) 60 MG capsule Take 1 capsule by mouth 2 (Two) Times a Day. Indications: Fibromyalgia Syndrome 180 capsule 3 Taking    finasteride (PROSCAR) 5 MG tablet Take 1 tablet by mouth Daily. Indications: Benign Enlargement of Prostate 90 tablet 3 Taking    Glucagon (Baqsimi One Pack) 3 MG/DOSE powder Administer 3 mg into the nostril(s) as directed by provider As Needed (hypoglycemia). 1 each 1 Taking As Needed    HYDROcodone-acetaminophen (NORCO) 7.5-325 MG per tablet Take 1 tablet by mouth Every 6 (Six) Hours As Needed for Moderate Pain. 120 tablet 0 Taking As Needed    Insulin Glargine (Lantus SoloStar) 100 UNIT/ML injection pen Inject 70 Units under the skin into the appropriate area as directed Every Morning.   Taking    Insulin Glargine (Lantus  "SoloStar) 100 UNIT/ML injection pen Inject 60 Units under the skin into the appropriate area as directed Every Evening.   Taking    losartan-hydrochlorothiazide (HYZAAR) 100-12.5 MG per tablet Take 1 tablet by mouth Daily.   Taking    metFORMIN ER (GLUCOPHAGE-XR) 500 MG 24 hr tablet TAKE 2 TABLETS BY MOUTH TWICE DAILY 180 tablet 0 Taking    omeprazole (priLOSEC) 40 MG capsule Take 1 capsule by mouth 2 (Two) Times a Day. Indications: Heartburn 180 capsule 3 Taking    ondansetron ODT (ZOFRAN-ODT) 8 MG disintegrating tablet Place 1 tablet on the tongue Every 8 (Eight) Hours As Needed for Nausea. 15 tablet 0 Taking As Needed    tamsulosin (FLOMAX) 0.4 MG capsule 24 hr capsule Take 2 capsules by mouth Daily.   Taking    traZODone (DESYREL) 100 MG tablet TAKE 2 TABLETS BY MOUTH EVERY EVENING 180 tablet 1 Taking    B-D UF III MINI PEN NEEDLES 31G X 5 MM misc Inject 1 Needle under the skin into the appropriate area as directed 2 (Two) Times a Day. use as directed 60 each 3     BD Insulin Syringe U/F 31G X 5/16\" 1 ML misc Inject 1 syringe under the skin into the appropriate area as directed Daily. 60 each 1     Continuous Glucose Sensor (FreeStyle Mason 2 Sensor) misc 1 Units by Other route Every 14 (Fourteen) Days. Apply every 14 days and use to check blood sugar 4 each 3          Current Facility-Administered Medications:     acetaminophen (TYLENOL) tablet 650 mg, 650 mg, Oral, Q4H PRN, Carmen Andersen, JESSICA    amLODIPine (NORVASC) tablet 10 mg, 10 mg, Oral, Q24H, Carmen Andersen APRN    atorvastatin (LIPITOR) tablet 20 mg, 20 mg, Oral, Daily, Carmen Andersen, JESSICA    sennosides-docusate (PERICOLACE) 8.6-50 MG per tablet 2 tablet, 2 tablet, Oral, BID PRN **AND** polyethylene glycol (MIRALAX) packet 17 g, 17 g, Oral, Daily PRN **AND** bisacodyl (DULCOLAX) EC tablet 5 mg, 5 mg, Oral, Daily PRN **AND** bisacodyl (DULCOLAX) suppository 10 mg, 10 mg, Rectal, Daily PRN, Carmen Andersen, JESSICA    carvedilol (COREG) " tablet 12.5 mg, 12.5 mg, Oral, BID With Meals, Carmen Andersen APRN    dextrose (D50W) (25 g/50 mL) IV injection 25 g, 25 g, Intravenous, Q15 Min PRN, Carmen Andersen APRN    dextrose (GLUTOSE) oral gel 15 g, 15 g, Oral, Q15 Min PRN, Carmen Andersen, JESSICA    DULoxetine (CYMBALTA) DR capsule 60 mg, 60 mg, Oral, BID, Carmen Andersen APRN    finasteride (PROSCAR) tablet 5 mg, 5 mg, Oral, Daily, Carmen Andersen APRN    glucagon (GLUCAGEN) injection 1 mg, 1 mg, Intramuscular, Q15 Min PRN, Carmen Andersen APRN    HYDROmorphone (DILAUDID) injection 0.5 mg, 0.5 mg, Intravenous, Q3H PRN, 0.5 mg at 03/18/25 0438 **AND** naloxone (NARCAN) injection 0.4 mg, 0.4 mg, Intravenous, Q5 Min PRN, Carmen Andersen APRN    insulin glargine (LANTUS, SEMGLEE) injection 40 Units, 40 Units, Subcutaneous, Daily, Carmen Andersen APRN    insulin glargine (LANTUS, SEMGLEE) injection 40 Units, 40 Units, Subcutaneous, Q PM, Carmen Andersen APRN    insulin lispro (HUMALOG/ADMELOG) injection 2-7 Units, 2-7 Units, Subcutaneous, 4x Daily AC & at Bedtime, Carmen Andersen APRN, 2 Units at 03/17/25 2103    nitroglycerin (NITROSTAT) SL tablet 0.4 mg, 0.4 mg, Sublingual, Q5 Min PRN, Carmen Andersen APRN    ondansetron (ZOFRAN) injection 4 mg, 4 mg, Intravenous, Q6H PRN, Carmen Andersen, JESSICA, 4 mg at 03/18/25 0724    oxyCODONE (ROXICODONE) immediate release tablet 5 mg, 5 mg, Oral, Q4H PRN, Carmen Andersen, JESSICA, 5 mg at 03/17/25 2032    pantoprazole (PROTONIX) injection 40 mg, 40 mg, Intravenous, Q AM, Carmen Andersen APRN, 40 mg at 03/18/25 0438    [COMPLETED] Insert Peripheral IV, , , Once **AND** sodium chloride 0.9 % flush 10 mL, 10 mL, Intravenous, PRN, Sadi Lewis, MITCHEL    sodium chloride 0.9 % flush 10 mL, 10 mL, Intravenous, Q12H, Carmen Andersen APRN    sodium chloride 0.9 % flush 10 mL, 10 mL, Intravenous, PRN, Carmen Andersen APRN    sodium chloride 0.9 % infusion 40 mL, 40 mL,  Intravenous, PRN, Carmen Andersen, APRN    sodium chloride 0.9 % infusion, 75 mL/hr, Intravenous, Continuous, Carmen Andersen APRN, Last Rate: 75 mL/hr at 03/17/25 2124, 75 mL/hr at 03/17/25 2124    tamsulosin (FLOMAX) 24 hr capsule 0.8 mg, 0.8 mg, Oral, Daily, Carmen Andersen APRN      Objective     Results Review:  LABS:  Results from last 7 days   Lab Units 03/18/25  0124 03/17/25  1535   WBC 10*3/mm3 12.00* 9.29   HEMOGLOBIN g/dL 12.8* 13.5   HEMATOCRIT % 40.5 42.4   PLATELETS 10*3/mm3 352 390     Results from last 7 days   Lab Units 03/18/25  0124 03/17/25  1535   SODIUM mmol/L 139 140   POTASSIUM mmol/L 4.1 4.0   CHLORIDE mmol/L 101 100   CO2 mmol/L 27.4 24.3   BUN mg/dL 14 13   CREATININE mg/dL 0.99 0.93   CALCIUM mg/dL 9.2 10.1   BILIRUBIN mg/dL  --  0.4   ALK PHOS U/L  --  72   ALT (SGPT) U/L  --  8   AST (SGOT) U/L  --  18   GLUCOSE mg/dL 196* 174*         DIAGNOSTICS:  MRI LUMBAR SPINE W WO CONTRAST     Date of Exam: 3/17/2025 4:47 PM EDT     Indication: Fall 3/13, worsening pain and loss of bladder control since fall.     Comparison: MR lumbar spine 6/15/2024     Technique:  Routine multiplanar/multisequence sequence images of the lumbar spine were obtained before and after the uneventful administration of Prohance.          Findings:  Vertebral numbering: The last well formed disc is labeled L5-S1.     Alignment: Mild retrolisthesis of L5 on S1.     Vertebrae: Interval acute/subacute mild compression fractures at the inferior endplate of L3 and the superior endplate of L5 without significant osseous retropulsion. Mild associated bone marrow edema and enhancement. Multilevel degenerative endplate   changes. No suspicious appearing marrow lesions.      Discs and spinal canal: Multilevel disc desiccation and disc height loss. Degenerative changes detailed below by level. There is increased prominence of the ventral epidural space from L3-4 through the sacrum with both STIR hyper and hypointense  signal,   suspect mostly combination of epidural fat and prominence of epidural venous plexus though a small epidural hematoma in setting of trauma is not excluded.     Spinal cord and cauda equina: The visible spinal cord has normal signal and morphology. No cauda equina compression. The conus tip lies at L1.     Adjacent soft tissues: No significant abnormality.     Findings by level:     T12-L1: No significant interval change. No significant posterior disc bulge. Moderate left and mild right facet arthropathy. Ligamentum flavum thickening on the left. No significant spinal canal stenosis. No significant foraminal narrowing.     L1-2: No significant interval change. Mild diffuse disc bulge. Moderate bilateral facet arthropathy with ligamentum flavum thickening. No significant spinal canal stenosis. Mild bilateral foraminal narrowing.     L2-3: No significant interval change. Diffuse disc bulge with annular fissuring. Moderate bilateral facet arthropathy with ligamentum flavum thickening and likely degenerative small bilateral facet effusions. Mild spinal canal stenosis. Mild bilateral   foraminal narrowing.     L3-4: No significant interval change. Diffuse disc bulge. Severe bilateral facet arthropathy with ligamentum flavum thickening and similar likely degenerative bilateral facet effusions. Moderate spinal canal stenosis. Moderate to severe bilateral   foraminal narrowing. At L3 disc level, mildly increased, mild to moderate spinal canal stenosis due to epidural process as above.     L4-5: No significant interval change. Diffuse disc bulge. Severe bilateral facet arthropathy with ligamentum flavum thickening and likely degenerative small bilateral facet effusions. Mild spinal canal stenosis. Mild to moderate bilateral foraminal   narrowing.     L5-S1: Mildly increased, mild spinal canal stenosis due to epidural process as above. Otherwise, no significant interval change. Diffuse disc bulge with disc uncovering  and annular fissuring. Moderate bilateral facet arthropathy. Mild bilateral foraminal   narrowing.     IMPRESSION:  Impression:  Interval acute/subacute mild compression fractures at the inferior endplate of L3 and the superior endplate of L5 without significant osseous retropulsion.     Increased prominence of the ventral epidural space from L3-4 through the sacrum, suspect mostly combination of epidural fat and prominence of epidural venous plexus though a small superimposed epidural hematoma in setting of trauma is not excluded.     No significant interval change in multilevel degenerative changes, most pronounced at L3-4, where there is moderate spinal canal stenosis and moderate to severe bilateral foraminal narrowing.    Results Review:   I reviewed the patient's new clinical results.  I personally viewed patient's chart and imaging    Vital Signs   Temp:  [97.9 °F (36.6 °C)-98.3 °F (36.8 °C)] 98.3 °F (36.8 °C)  Heart Rate:  [] 114  Resp:  [17-22] 17  BP: (109-151)/(67-92) 150/78    Physical Exam:  Physical Exam  Vitals reviewed.   Eyes:      Extraocular Movements: Extraocular movements intact.      Conjunctiva/sclera: Conjunctivae normal.      Pupils: Pupils are equal, round, and reactive to light.   Musculoskeletal:      Cervical back: Normal range of motion.      Comments: Difficulty moving his legs due to pain   Skin:     General: Skin is warm and dry.   Neurological:      General: No focal deficit present.   Psychiatric:         Mood and Affect: Mood normal.         Speech: Speech normal.       Neurological Exam  Mental Status  Awake, alert and oriented to person, place and time. Oriented to person, place and time. Speech is normal. Language is fluent with no aphasia.    Cranial Nerves  CN III, IV, VI: Extraocular movements intact bilaterally. Pupils equal round and reactive to light bilaterally.    Motor  Decreased muscle bulk throughout.  Patient was unable to lift his legs due to the amount of  pain in his low back and his legs.  Patient has full strength in his feet.  No clonus was appreciated..    Reflexes    Right pathological reflexes: Ankle clonus absent.  Left pathological reflexes: Ankle clonus absent.    Gait    Deferred.      Assessment & Plan       Lumbar compression fracture      Problem List Items Addressed This Visit    None  Visit Diagnoses         Fall, initial encounter    -  Primary      Other closed fracture of third lumbar vertebra, initial encounter          Acute low back pain without sciatica, unspecified back pain laterality          Pain in both lower extremities                 COMORBID CONDITIONS:  Morbid obesity, type 2 diabetes uncontrolled, asthma    Patient is a 64-year-old male who presents to [facility] after falling out of a utility trailer this past Thursday.  Patient states that his low back pain and radicular symptoms in his lower extremities have progressively gotten worse.    Patient had an MRI of his lumbar spine that shows 2 fractures.  One of the inferior endplate of L3 and the superior endplate of L5 without significant retropulsion.  He also has a possible epidural hematoma at L3-4.    On physical examination patient has good strength in his feet, but due to the amount of pain he is in he was unable to lift his legs.  I do not appreciate clonus on examination.    At this time to the amount of pain he is in and the fact that he could barely lift up his legs, I am consulting pain management for the patient to be evaluated for kyphoplasty.  With this amount of pain he would not benefit from an LSO brace.    Without being said there is nothing further needed from a neurosurgical standpoint.  Should the patient have any new or worsening symptoms please reconsult neurosurgery.    Will sign off at this time.  Please reach out any questions or concerns    PLAN:   L3 and L5 compression fractures  -Pain management consult for potential kyphoplasty  -Pain management per  "primary  -Medical management per primary      I discussed the patient's findings and my recommendations with patient and     During patient visit, I utilized appropriate personal protective equipment including gloves and mask.  Mask used was standard procedure mask. Appropriate PPE was worn during the entire visit.  Hand hygiene was completed before and after.     Lexus Zuluaga PA-C  03/18/25  08:41 EDT    \"Dictated utilizing Dragon dictation\".    "

## 2025-03-18 NOTE — PLAN OF CARE
Goal Outcome Evaluation:              Outcome Evaluation: Pt admitted to unit.  Prn pain medication given. NPO for possible procedure.  Will continue to monitor.

## 2025-03-18 NOTE — CONSULTS
CHIEF COMPLAINT  Lower back pain      Subjective   Matthieu Hawk is a 64 y.o. male who presented to [facility] ER after falling out of utility trailer on 3/13/2025.  He immediately had increased lower back pain and unfortunately worsened over the next couple days leading to ER on 3/17/2024.  Due to severe pain, patient was unable to walk and has been on escalating doses of pain medications while being inpatient and still unable to move significantly due to pain.  He denies any bowel or bladder incontinence or saddle anesthesia.  MRI lumbar spine was done showing acute compression fracture of L3 and L5 vertebral levels.  Evaluated by neurosurgery and not a surgical candidate.  Unable to tolerate TLSO due to severe pain.  He had been previously seen by outside pain management and had multiple epidurals and MBB's without significant pain relief.    Lower back pain is 9/10 on VAS, at maximum is 10/10. Pain is sharp and stabbing in nature. Pain is referred to bilateral legs but that has been chronic and his main complaint is axial lower back pain. The pain is constant. The pain is improved by some by pain medications. The pain is worse with any small movements including even turning in bed..       PMH:   Morbid obesity, DM-2, depression, CVA    Current Medications:   Dilaudid 0.5 mg every 3 hours as needed  Roxicodone 5 mg every 4 hours as needed  Cymbalta        PEG Assessment   What number best describes your pain on average in the past week?10  What number best describes how, during the past week, pain has interfered with your enjoyment of life?10  What number best describes how, during the past week, pain has interfered with your general activity?  10    PHQ-9 Depression Screening  Little interest or pleasure in doing things?     Feeling down, depressed, or hopeless?     PHQ-2 Total Score     Trouble falling or staying asleep, or sleeping too much?     Feeling tired or having little energy?     Poor appetite or  overeating?     Feeling bad about yourself - or that you are a failure or have let yourself or your family down?     Trouble concentrating on things, such as reading the newspaper or watching television?     Moving or speaking so slowly that other people could have noticed? Or the opposite - being so fidgety or restless that you have been moving around a lot more than usual?     Thoughts that you would be better off dead, or of hurting yourself in some way?     PHQ-9 Total Score     If you checked off any problems, how difficult have these problems made it for you to do your work, take care of things at home, or get along with other people?               Current Facility-Administered Medications:     acetaminophen (TYLENOL) tablet 650 mg, 650 mg, Oral, Q4H PRN, Carmen Andersen APRN    amLODIPine (NORVASC) tablet 10 mg, 10 mg, Oral, Q24H, Carmen Andersen APRN, 10 mg at 03/18/25 0933    atorvastatin (LIPITOR) tablet 20 mg, 20 mg, Oral, Daily, Carmen Andersen APRN, 20 mg at 03/18/25 0933    sennosides-docusate (PERICOLACE) 8.6-50 MG per tablet 2 tablet, 2 tablet, Oral, BID PRN **AND** polyethylene glycol (MIRALAX) packet 17 g, 17 g, Oral, Daily PRN **AND** bisacodyl (DULCOLAX) EC tablet 5 mg, 5 mg, Oral, Daily PRN **AND** bisacodyl (DULCOLAX) suppository 10 mg, 10 mg, Rectal, Daily PRN, Carmen Andersen APRN    carvedilol (COREG) tablet 12.5 mg, 12.5 mg, Oral, BID With Meals, Carmen Andersen APRN, 12.5 mg at 03/18/25 0933    dextrose (D50W) (25 g/50 mL) IV injection 25 g, 25 g, Intravenous, Q15 Min PRN, Carmen Andersen APRN    dextrose (GLUTOSE) oral gel 15 g, 15 g, Oral, Q15 Min PRN, Carmen Andersen APRN    DULoxetine (CYMBALTA) DR capsule 60 mg, 60 mg, Oral, BID, Carmen Andersen APRN, 60 mg at 03/18/25 0938    finasteride (PROSCAR) tablet 5 mg, 5 mg, Oral, Daily, Carmen Andersen APRN, 5 mg at 03/18/25 0933    glucagon (GLUCAGEN) injection 1 mg, 1 mg, Intramuscular, Q15 Min PRN,  Carmen Andersen, APRN    HYDROmorphone (DILAUDID) injection 0.5 mg, 0.5 mg, Intravenous, Q3H PRN, 0.5 mg at 03/18/25 0934 **AND** naloxone (NARCAN) injection 0.4 mg, 0.4 mg, Intravenous, Q5 Min PRN, Carmen Andersen, APRN    insulin glargine (LANTUS, SEMGLEE) injection 40 Units, 40 Units, Subcutaneous, Daily, Carmen Andersen APRN, 40 Units at 03/18/25 0933    insulin glargine (LANTUS, SEMGLEE) injection 40 Units, 40 Units, Subcutaneous, Q PM, Carmen Andersen APRN    insulin lispro (HUMALOG/ADMELOG) injection 2-7 Units, 2-7 Units, Subcutaneous, 4x Daily AC & at Bedtime, Carmen Andersen APRN, 3 Units at 03/18/25 1138    nitroglycerin (NITROSTAT) SL tablet 0.4 mg, 0.4 mg, Sublingual, Q5 Min PRN, Carmen Andersen, APRN    ondansetron (ZOFRAN) injection 4 mg, 4 mg, Intravenous, Q6H PRN, Carmen Andersen APRN, 4 mg at 03/18/25 0724    oxyCODONE (ROXICODONE) immediate release tablet 5 mg, 5 mg, Oral, Q4H PRN, Carmen Andersen, APRN, 5 mg at 03/17/25 2032    pantoprazole (PROTONIX) injection 40 mg, 40 mg, Intravenous, Q AM, Carmen Andersen, APRN, 40 mg at 03/18/25 0438    [COMPLETED] Insert Peripheral IV, , , Once **AND** sodium chloride 0.9 % flush 10 mL, 10 mL, Intravenous, PRN, Sadi Lewis PA-C    sodium chloride 0.9 % flush 10 mL, 10 mL, Intravenous, Q12H, Carmen Andersen APRN, 10 mL at 03/18/25 0935    sodium chloride 0.9 % flush 10 mL, 10 mL, Intravenous, PRN, Carmen Andersen, APRN    sodium chloride 0.9 % infusion 40 mL, 40 mL, Intravenous, PRN, Carmen Andersen APRN    sodium chloride 0.9 % infusion, 75 mL/hr, Intravenous, Continuous, Carmen Andersen APRN, Last Rate: 75 mL/hr at 03/17/25 2124, 75 mL/hr at 03/17/25 2124    tamsulosin (FLOMAX) 24 hr capsule 0.8 mg, 0.8 mg, Oral, Daily, Carmen Andersen APRN, 0.8 mg at 03/18/25 0933    The following portions of the patient's history were reviewed and updated as appropriate: allergies, current medications, past family  history, past medical history, past social history, past surgical history, and problem list.      REVIEW OF PERTINENT MEDICAL DATA    Past Medical History:   Diagnosis Date    Chronic back pain     Depression     Diabetes mellitus     Hypertension     Stroke      Past Surgical History:   Procedure Laterality Date    BONE SPUR LEG      CARPAL TUNNEL RELEASE Bilateral     RETINAL DETACHMENT REPAIR Bilateral     SINUS SURGERY       History reviewed. No pertinent family history.  Social History     Socioeconomic History    Marital status:    Tobacco Use    Smoking status: Former     Current packs/day: 0.00     Average packs/day: 2.0 packs/day for 18.0 years (36.0 ttl pk-yrs)     Types: Cigarettes     Start date:      Quit date:      Years since quittin.2     Passive exposure: Never    Smokeless tobacco: Never   Vaping Use    Vaping status: Never Used   Substance and Sexual Activity    Alcohol use: Yes    Drug use: Not Currently    Sexual activity: Defer         Review of Systems   Musculoskeletal:  Positive for arthralgias and back pain.         Vitals:    25 0450 25 0511 25 0804 25 1131   BP: 142/72  150/78 120/62   BP Location: Right arm  Right arm Right arm   Patient Position: Lying  Lying Lying   Pulse: 114   107   Resp:    Temp: 98.3 °F (36.8 °C)      TempSrc: Oral  Oral    SpO2: 93%   96%   Weight:  (!) 141 kg (309 lb 15.5 oz)     Height:             Objective   Physical Exam  Musculoskeletal:         General: Tenderness present.        Back:            Imaging Reviewed:  MRI lumbar spine with and without contrast-3/17/2025  - Acute mild compression fracture of inferior endplate of L3 and superior endplate of L5 without significant osseous retropulsion.  Mild associated bone marrow edema and enhancement.  - Increased prominence of ventral epidural space from L3-L4 through the sacrum with both steer hyper and hypointense signal-suspect mostly combination of  epidural fat and prominence of epidural venous plexus though a small epidural hematoma in the setting of trauma is not excluded  L1-2-moderate facet arthropathy  L2-3-moderate bilateral facet arthropathy, ligamentum flavum thickening, small bilateral facet effusions  L3-4-severe bilateral facet arthropathy, ligamentum flavum thickening, moderate spinal canal stenosis.  Moderate to severe bilateral foraminal narrowing.  L4-5-severe bilateral facet arthropathy, ligamentum flavum thickening  L5-S1-mild spinal canal stenosis due to epidural process as above.  Moderate bilateral facet arthropathy.    Assessment:    1. Compression fracture of L3 lumbar vertebra, closed, initial encounter    2. Compression fracture of L5 lumbar vertebra, closed, initial encounter         Plan:   1.  MRI lumbar spine was reviewed independently by me.  I also went over MRI with patient and his family.  MRI shows acute compression fractures of L3 and L5.  Physical exam consistent with severe pain on palpation over L3 and L5 vertebral level.  Patient has been in excruciating pain and axial lower back and unable to do any activities due to severe pain and unable to tolerate TLSO.  He is requiring escalating doses of pain medications.  Discussed with patient that he may consider TLSO for 6 to 8 weeks and see if fracture heals, but he is unable to tolerate TLSO and pain is so severe he is unable to do any activities.  Discussed with patient that he is good candidate for L3 and L5 kyphoplasty.  Benefits and risks were discussed with patient including but not limited to bleeding, infection, nerve damage, paralysis, pulmonary embolism, cement leak, death.  Patient understands and agrees with the plan and agrees to proceed with the procedure.  Discussed with patient that due to his morbid obesity, if I am unable to get appropriate imaging during procedure, I may not be able to proceed with the procedure.  He understands and agrees with the  plan.    Will schedule him for kyphoplasty at L3 and L5 level.      Mane Jon DO  Pain Management   [facility]         INSPECT REPORT    As part of the patient's treatment plan, I may be prescribing controlled substances. The patient has been made aware of appropriate use of such medications, including potential risk of somnolence, limited ability to drive and/or work safely, and the potential for dependence or overdose. It has also been made clear that these medications are for use by this patient only, without concomitant use of alcohol or other substances unless prescribed.     Patient has completed prescribing agreement detailing terms of continued prescribing of controlled substances, including monitoring INSPECT reports, urine drug screening, and pill counts if necessary. The patient is aware that inappropriate use will results in cessation of prescribing such medications.    INSPECT report has been reviewed and scanned into the patient's chart.      EMR Dragon/Transcription Disclaimer:   Much of this encounter note is an electronic transcription/translation of spoken language to printed text. The electronic translation of spoken language may permit erroneous, or at times, nonsensical words or phrases to be inadvertently transcribed; Although I have reviewed the note for such errors, some may still exist.

## 2025-03-18 NOTE — CASE MANAGEMENT/SOCIAL WORK
Discharge Planning Assessment   Ambrosio     Patient Name: Matthieu Hawk  MRN: [id number]  Today's Date: 3/18/2025    Admit Date: 3/17/2025    Plan: From home with wife. Pending PT/OT rusty. Watch O2 needs.   Discharge Needs Assessment       Row Name 03/18/25 1308       Living Environment    People in Home spouse    Current Living Arrangements home    Potentially Unsafe Housing Conditions none    In the past 12 months has the electric, gas, oil, or water company threatened to shut off services in your home? No    Primary Care Provided by self    Provides Primary Care For no one    Family Caregiver if Needed spouse    Family Caregiver Names Sharri    Quality of Family Relationships helpful;involved;supportive    Able to Return to Prior Arrangements yes       Resource/Environmental Concerns    Resource/Environmental Concerns none    Transportation Concerns none       Transportation Needs    In the past 12 months, has lack of transportation kept you from medical appointments or from getting medications? no    In the past 12 months, has lack of transportation kept you from meetings, work, or from getting things needed for daily living? No       Food Insecurity    Within the past 12 months, you worried that your food would run out before you got the money to buy more. Never true    Within the past 12 months, the food you bought just didn't last and you didn't have money to get more. Never true       Transition Planning    Patient/Family Anticipates Transition to home with family    Patient/Family Anticipated Services at Transition none    Transportation Anticipated family or friend will provide       Discharge Needs Assessment    Readmission Within the Last 30 Days no previous admission in last 30 days    Equipment Currently Used at Home walker, rolling;wheelchair;cane, quad    Concerns to be Addressed denies needs/concerns at this time    Do you want help finding or keeping work or a job? Patient declined    Do you want  help with school or training? For example, starting or completing job training or getting a high school diploma, GED or equivalent Patient declined    Anticipated Changes Related to Illness none    Equipment Needed After Discharge none                   Discharge Plan       Row Name 03/18/25 1309       Plan    Plan From home with wife. Pending PT/OT evals. Watch O2 needs.    Patient/Family in Agreement with Plan yes    Plan Comments CM met with patient and wife, Sharri at bedside. Confirmed PCP, insurance, and pharmacy.  Meds to beds enrolled. Patient denies any difficulty affording medications. Patient not current with any therapies. PT/OT will eval after pain management procedure. Confirmed transportation at discharge will be wife. DC Barriers: pain management consult, pain control, 2L O2, IV protonix, IVF.              Demographic Summary       Row Name 03/18/25 1307       General Information    Admission Type inpatient    Arrived From emergency department    Referral Source admission list    Reason for Consult discharge planning    Preferred Language English       Contact Information    Permission Granted to Share Info With                 Functional Status       Row Name 03/18/25 1302       Functional Status    Usual Activity Tolerance moderate    Current Activity Tolerance moderate       Functional Status, IADL    Medications independent    Meal Preparation independent    Housekeeping independent    Laundry independent    Shopping independent    If for any reason you need help with day-to-day activities such as bathing, preparing meals, shopping, managing finances, etc., do you get the help you need? I get all the help I need             Antonietta De La Cruz RN     Office: [phone]

## 2025-03-18 NOTE — PLAN OF CARE
Problem: Skin Injury Risk Increased  Goal: Skin Health and Integrity  Outcome: Progressing  Intervention: Optimize Skin Protection  Recent Flowsheet Documentation  Taken 3/18/2025 0934 by Gregoria Claudio, RN  Activity Management: activity minimized  Pressure Reduction Techniques: frequent weight shift encouraged  Pressure Reduction Devices: pressure-redistributing mattress utilized  Skin Protection: transparent dressing maintained     Problem: Fall Injury Risk  Goal: Absence of Fall and Fall-Related Injury  Outcome: Progressing  Intervention: Promote Injury-Free Environment  Recent Flowsheet Documentation  Taken 3/18/2025 1000 by Gregoria Claudio, RN  Safety Promotion/Fall Prevention: safety round/check completed  Taken 3/18/2025 0934 by Gregoria Claudio, RN  Safety Promotion/Fall Prevention: safety round/check completed  Taken 3/18/2025 0800 by Gregoria Claudio, RN  Safety Promotion/Fall Prevention: safety round/check completed   Goal Outcome Evaluation:

## 2025-03-18 NOTE — CONSULTS
"Diabetes Education  Assessment/Teaching    Patient Name:  Matthieu Hawk  YOB: 1960  MRN: [id number]  Admit Date:  3/17/2025      Assessment Date:  3/18/2025  Flowsheet Row Most Recent Value   General Information     Referral From: MD order  [Consult received due to bs >200. BS today at 0803 237 and at 1113 205. Last A1c in [facility] was from 1/24/2025 and result was 9.3%. Adm bs 174.]   Height 177.8 cm (70\")   Height Method Stated   Weight 141 kg (309 lb 15.5 oz)   Weight Method Stated   Pregnancy Assessment    Diabetes History    What type of diabetes do you have? Type 2   Length of Diabetes Diagnosis --  [Dx in 4/2005.]   Have you had diabetes education/teaching in the past? yes   When and where was your diabetes education? Last seen by inpatient diabetes educator at [facility] on 6/17/2024.   Do you test your blood sugar at home? yes   Frequency of checks wears continuous glucose sensor   Meter type Freestyle Mason 2, pt does not have bs meter for backup   Who performs the test? self   Have you had low blood sugar? (<70mg/dl) yes   How often do you have low blood sugar? frequently  [occurs at least 3-4 times/week.]   Education Preferences    Nutrition Information    Assessment Topics    DM Goals             Flowsheet Row Most Recent Value   DM Education Needs    Meter Needs meter  [Instructed pt in use of Accuchek Guide Me meter. Pt verbalized understanding of how to insert test strip and where to apply the blood. Pt loaded lancet into lancing device and understands use of device.]   Meter Type Accuchek   Frequency of Testing --  [Discussed checking bs if he feels differently that what sensor readings are giving him. Also discussed checking bs if having low or high bs.]   Blood Glucose Target Range Discussed most recent A1c result from 1/24/2025 of 9.3%. Discussed healthy bs range and healthy A1c target. Discussed importance of bs control.   Medication Oral, Insulin, Other injectables, Pen  [Pt taking Farxiga " 10 mg daily, Metformin  mg 2 pills bid, Trulicity 4.5 mg weekly on Saturdays, and Lantus 70 units in am and 60 units pm.]   Problem Solving Hypoglycemia, Hyperglycemia, Signs, Symptoms, Treatment  [Pt states he has not been treating hypoglycemia. Discussed appropriate treatment and importance of always keeping low bs tx available.]   Reducing Risks A1C testing  [Gave A1c info sheet.]   Healthy Eating --  [Pt usually eating 1 meal/day. He has not been drinking sugared beverages. If having tea, he will use noncaloric sweetener. Pt states has lost about 45 lbs since last June.]   Motivation Engaged   Teaching Method Explanation, Discussion, Demonstration, Handouts   Patient Response Verbalized understanding, Demonstrates adequately              Other Comments:  Met with pt, wife and other family members at bedside. Pt has PCP and sees every 3 months. Pt states he is wanting his A1c to be in healthy range. Pt states since he was having some low bs, PCP has reduced his insulin doses at last PCP visit. Educator reviewed previous A1c results from 8/9/2024 of 9.9% and from 6/15/2024 of 9.68%. Discussed pt receiving over 100 units of long-acting insulin and he would benefit from having mealtime insulin ordered. Encouraged pt to discuss with MD. Explained the high dose of Lantus may be what is contributing to his hypoglycemia and discussed if mealtime insulin ordered, the MD would most likely decrease the Lantus doses and add mealtime insulin doses. Pt states he is open to taking mealtime insulin. Educator will send secure chat to MD to recommend starting pt on mealtime insulin therapy in addition to lantus. Rxs started for Accuchek Guide Me meter, test strips and lancets. Pt/wife with no additional questions.       Electronically signed by:  Nhi Polo RN  03/18/25 14:57 EDT

## 2025-03-18 NOTE — PROGRESS NOTES
[facility] MEDICINE SERVICE  DAILY PROGRESS NOTE    NAME: Matthieu Hawk  : 1960  MRN: [id number]      LOS: 1 day     PROVIDER OF SERVICE: JESSICA Herbert    Chief Complaint: Lumbar compression fracture    Subjective:     Interval History:  History taken from: patient chart    Patient lying in bed.  Pain is improved with pain medication.  All questions and concerns addressed.    Review of Systems:   Please see above, review of systems otherwise negative     Objective:     Vital Signs  Temp:  [97.9 °F (36.6 °C)-98.3 °F (36.8 °C)] 98.3 °F (36.8 °C)  Heart Rate:  [] 114  Resp:  [17-22] 17  BP: (109-151)/(67-92) 150/78  Flow (L/min) (Oxygen Therapy):  [2] 2   Body mass index is 44.48 kg/m².    Physical Exam    PHYSICAL EXAM  Constitutional:  Well-developed, well-nourished, no acute distress, nontoxic appearance   Eyes:  PERRL, conjunctivae normal, EOMI   HENT:  Atraumatic, external ears normal, nose normal, oropharynx moist, no pharyngeal exudates. Neck-normal range of motion, no tenderness, supple, trachea midline  Respiratory:  ctab, nonlabored respirations without accessory muscle use  Cardiovascular:  Normal rate, normal rhythm, no murmurs, no gallops, no rubs   GI:  Soft, nondistended, normal bowel sounds, nontender, no organomegaly, no mass, no rebound, no guarding   Musculoskeletal:  No edema, no tenderness, no deformities Difficulty raising RLE off bed.   Denies sensation changes, numbness or tingling to BLE. Tenderness to lumbar spine.  Integument:  Well hydrated, no rash   Neurologic:  Alert & oriented x 3, CN 2-12 normal, normal motor function, normal sensory function, no focal deficits noted   Psychiatric:  Speech and behavior appropriate       Diagnostic Data    Results from last 7 days   Lab Units 25  0124 25  1535   WBC 10*3/mm3 12.00* 9.29   HEMOGLOBIN g/dL 12.8* 13.5   HEMATOCRIT % 40.5 42.4   PLATELETS 10*3/mm3 352 390   GLUCOSE mg/dL 196* 174*   CREATININE  mg/dL 0.99 0.93   BUN mg/dL 14 13   SODIUM mmol/L 139 140   POTASSIUM mmol/L 4.1 4.0   AST (SGOT) U/L  --  18   ALT (SGPT) U/L  --  8   ALK PHOS U/L  --  72   BILIRUBIN mg/dL  --  0.4   ANION GAP mmol/L 10.6 15.7*       CT Head Without Contrast  Result Date: 3/17/2025  Impression: No acute intracranial pathology. Electronically Signed: Zenon Rodriguez MD  3/17/2025 6:06 PM EDT  Workstation ID: [id number]    MRI Lumbar Spine With & Without Contrast  Result Date: 3/17/2025  Impression: Interval acute/subacute mild compression fractures at the inferior endplate of L3 and the superior endplate of L5 without significant osseous retropulsion. Increased prominence of the ventral epidural space from L3-4 through the sacrum, suspect mostly combination of epidural fat and prominence of epidural venous plexus though a small superimposed epidural hematoma in setting of trauma is not excluded. No significant interval change in multilevel degenerative changes, most pronounced at L3-4, where there is moderate spinal canal stenosis and moderate to severe bilateral foraminal narrowing. Electronically Signed: Parveen Ramirez  3/17/2025 5:52 PM EDT  Workstation ID: [id number]        I reviewed the patient's new clinical results.  I reviewed the patient's new imaging results and agree with the interpretation.    Assessment/Plan:     Active and Resolved Problems  Active Hospital Problems    Diagnosis  POA    **Lumbar compression fracture [S32.000A]  Yes      Resolved Hospital Problems   No resolved problems to display.       Lumbar compression fracture:  -s/p fall off utility trailer on 3/13. OSH images negative on 3/13. Discharge home with flexeril.   -over past 2 days with new urinary incontinence, difficulty ambulating, worsening pain to lower back and BLE.   -MRI: acute/subacute mild compression fractures at inferior endplate L3 and superior end place L5 without significant osseous retropulsion. Increased prominence of ventral epidural  space from L 3-4 through the sacrum, cannot rule out small superimposed epidural hematoma.  -bedrest until evaluated by neurosurgery  -neurovascular checks BLE every 4 hours.   -pain control with oxycodone and dilaudid. Adjust as needed.   -NPO after MN.   -will need PT/OT evaluation/treatment after neurosurgery evaluation.   -fall precautions.   -Neurosurgery following patient.  Recommends pain management consult     Diabetes mellitus type 2 with HLD:  -accu check AC/HS with SSI and hypoglycemic protocols  -CCD  -A1c was 9.3% on 1/24/2025  -continue statin  -holding farxiga, metformin  -continue lantus decreasing dose to 40 units BID until diet fully resumes.      Hypertension:  -trend BP  -BP may be labile with pain response.   -continue home antihypertensive regimen once medications verified by pharmacy  -holding HCTZ while on IVFs.      GERD:  -Protonix for GI Ppx.     Patient was seen and evaluated at bedside.  Neurosurgery has also evaluated this patient, recommended pain management consult for kyphoplasty.  Will await for pain management recommendations.  Will also have PT OT evaluate and appreciate their recommendations    VTE Prophylaxis:  Mechanical VTE prophylaxis orders are present.             Disposition Planning:     Barriers to Discharge: Consults  Anticipated Date of Discharge: 1/19/2025  Place of Discharge: Home vs rehab      Time: 30 minutes     Code Status and Medical Interventions: CPR (Attempt to Resuscitate); Full Support   Ordered at: 03/17/25 2008     Code Status (Patient has no pulse and is not breathing):    CPR (Attempt to Resuscitate)     Medical Interventions (Patient has pulse or is breathing):    Full Support     Level Of Support Discussed With:    Patient       Signature: Electronically signed by JESSICA Herbert, 03/18/25, 09:55 EDT.  [facility] Hospitalist Team

## 2025-03-18 NOTE — H&P
"[facility] Medicine Services  History & Physical    Patient Name: Matthieu Hawk  : 1960  MRN: [id number]  Primary Care Physician:  Mustapha Barlow DO  Date of admission: 3/17/2025  Date and Time of Service: 3/17/2025 at 1920    Subjective      Chief Complaint: back pain    History of Present Illness: Mathtieu Hawk is a 64 y.o. male with a CMH of HTN, GERD, DM type 2, HLD, asthma, CVA, chronic back pain who presented to [facility] with lower back pain with difficulty ambulating. Lives at home with wife, independent with ADLs.   States on 3/13 fell off utility trailer landing on back. Denies LOC. EMS took to [facility] ER. Reports imaging CXR and CT scans negative and was discharge home with flexeril. Over past 2 days with worsening back pain, urinary incontinence, difficulty ambulating. Pain radiates into right hip, RLE and LLE. Difficulty lifting RLE off bed. Denies fever, chills, headache, chest pain. On arrival to ER VS: 97.6-82-/% room air.     Upon evaluation, awake, alert, resting in bed. Wife at bedside. Current VS: 105-/86-95% room air. Rates lower back pain 8/10. On exam with difficulty lifting RLE. LLE \"throbbing.\" Denies sensation changes to BLE, denies numbness/tingling.   CT head no acute intracranial findings.   MRI reveals acute/subacute mild compression fractures at inferior endplate L3 and superior end place L5 without significant osseous retropulsion. Increased prominence of ventral epidural space from L 3-4 through the sacrum, cannot rule out small superimposed epidural hematoma.   Blood work reveals unremarkable CBC, glucose 174, Agap 15,         Review of Systems   Constitutional:  Positive for activity change. Negative for chills and fever.   Respiratory:  Negative for shortness of breath.    Cardiovascular:  Negative for chest pain.   Gastrointestinal:  Negative for abdominal pain.   Genitourinary:         New incontinence " "  Musculoskeletal:  Positive for back pain and gait problem.   Neurological:  Positive for weakness.       Personal History     Past Medical History:   Diagnosis Date    Chronic back pain     Depression     Diabetes mellitus     Hypertension     Stroke        Past Surgical History:   Procedure Laterality Date    BONE SPUR LEG      CARPAL TUNNEL RELEASE Bilateral     RETINAL DETACHMENT REPAIR Bilateral     SINUS SURGERY         Family History: family history is not on file. Otherwise pertinent FHx was reviewed and not pertinent to current issue.    Social History:  reports that he quit smoking about 31 years ago. His smoking use included cigarettes. He started smoking about 49 years ago. He has a 36 pack-year smoking history. He has never been exposed to tobacco smoke. He has never used smokeless tobacco. He reports current alcohol use. He reports that he does not currently use drugs.    Home Medications:  Prior to Admission Medications       Prescriptions Last Dose Informant Patient Reported? Taking?    HYDROcodone-acetaminophen (NORCO) 7.5-325 MG per tablet   No No    Take 1 tablet by mouth Every 6 (Six) Hours As Needed for Moderate Pain.    amLODIPine (NORVASC) 10 MG tablet   No No    Take 1 tablet by mouth Daily. Indications: High Blood Pressure Disorder    aspirin 81 MG chewable tablet   No No    Chew 1 tablet Daily. Indications: Disease involving Lipid Deposits in the Arteries    atorvastatin (LIPITOR) 20 MG tablet   No No    Take 1 tablet by mouth Daily. Indications: High Amount of Fats in the Blood    B-D UF III MINI PEN NEEDLES 31G X 5 MM misc   No No    Inject 1 Needle under the skin into the appropriate area as directed 2 (Two) Times a Day. use as directed    BD Insulin Syringe U/F 31G X 5/16\" 1 ML misc   No No    Inject 1 syringe under the skin into the appropriate area as directed Daily.    carvedilol (COREG) 12.5 MG tablet   No No    Take 1 tablet by mouth 2 (Two) Times a Day With Meals. Indications: " High Blood Pressure Disorder    clotrimazole (LOTRIMIN) 1 % external solution   No No    Apply  topically to the appropriate area as directed 2 (Two) Times a Day.    Continuous Glucose Sensor (FreeStyle Mason 2 Sensor) misc   No No    1 Units by Other route Every 14 (Fourteen) Days. Apply every 14 days and use to check blood sugar    dapagliflozin (FARXIGA) 5 MG tablet tablet   No No    Take 2 tablets by mouth Daily. Indications: Type 2 Diabetes    Dulaglutide 4.5 MG/0.5ML solution auto-injector   No No    Inject 4.5 mg under the skin into the appropriate area as directed 1 (One) Time Per Week.    DULoxetine (CYMBALTA) 60 MG capsule   No No    Take 1 capsule by mouth 2 (Two) Times a Day. Indications: Fibromyalgia Syndrome    finasteride (PROSCAR) 5 MG tablet   No No    Take 1 tablet by mouth Daily. Indications: Benign Enlargement of Prostate    Glucagon (Baqsimi One Pack) 3 MG/DOSE powder   No No    Administer 3 mg into the nostril(s) as directed by provider As Needed (hypoglycemia).    HYDROcodone-acetaminophen (NORCO) 7.5-325 MG per tablet   No No    Take 1 tablet by mouth Every 6 (Six) Hours As Needed for Moderate Pain. Indications: Pain    Insulin Glargine (LANTUS SOLOSTAR) 100 UNIT/ML injection pen   No No    Inject 70 units subcutaneously each morning.  Inject 60 units subcutaneously each evening.  Indications: Type 2 Diabetes    losartan-hydrochlorothiazide (HYZAAR) 100-12.5 MG per tablet   Yes No    Take 1 tablet by mouth Daily.    metFORMIN ER (GLUCOPHAGE-XR) 500 MG 24 hr tablet   No No    TAKE 2 TABLETS BY MOUTH TWICE DAILY    omeprazole (priLOSEC) 40 MG capsule   No No    Take 1 capsule by mouth 2 (Two) Times a Day. Indications: Heartburn    ondansetron ODT (ZOFRAN-ODT) 8 MG disintegrating tablet   No No    Place 1 tablet on the tongue Every 8 (Eight) Hours As Needed for Nausea.    traZODone (DESYREL) 100 MG tablet   No No    TAKE 2 TABLETS BY MOUTH EVERY EVENING              Allergies:  Allergies    Allergen Reactions    Gabapentin Other (See Comments)     Severe shaking    Lyrica [Pregabalin] Other (See Comments)     Severe shaking    Codeine Itching and Rash       Objective      Vitals:   Temp:  [97.9 °F (36.6 °C)] 97.9 °F (36.6 °C)  Heart Rate:  [] 104  Resp:  [22] 22  BP: (123-151)/(78-92) 151/92  Body mass index is 44.48 kg/m².  Physical Exam  Constitutional:       Appearance: Normal appearance.   HENT:      Head: Normocephalic and atraumatic.      Mouth/Throat:      Mouth: Mucous membranes are moist.   Eyes:      Extraocular Movements: Extraocular movements intact.      Pupils: Pupils are equal, round, and reactive to light.   Cardiovascular:      Rate and Rhythm: Normal rate and regular rhythm.      Pulses: Normal pulses.      Heart sounds: Normal heart sounds.   Pulmonary:      Effort: Pulmonary effort is normal.      Breath sounds: Normal breath sounds.   Abdominal:      General: Bowel sounds are normal.      Palpations: Abdomen is soft.   Musculoskeletal:      Right lower leg: No edema.      Left lower leg: No edema.      Comments: Difficulty raising RLE off bed.   Denies sensation changes, numbness or tingling to BLE.   Tenderness to lumbar spine.    Skin:     General: Skin is warm and dry.   Neurological:      Mental Status: He is alert and oriented to person, place, and time. Mental status is at baseline.      Motor: Weakness present.         Diagnostic Data:  Lab Results (last 24 hours)       Procedure Component Value Units Date/Time    Comprehensive Metabolic Panel [[id number]]  (Abnormal) Collected: 03/17/25 1535    Specimen: Blood from Arm, Left Updated: 03/17/25 1606     Glucose 174 mg/dL      BUN 13 mg/dL      Creatinine 0.93 mg/dL      Sodium 140 mmol/L      Potassium 4.0 mmol/L      Chloride 100 mmol/L      CO2 24.3 mmol/L      Calcium 10.1 mg/dL      Total Protein 7.7 g/dL      Albumin 4.3 g/dL      ALT (SGPT) 8 U/L      AST (SGOT) 18 U/L      Alkaline Phosphatase 72 U/L      Total  Bilirubin 0.4 mg/dL      Globulin 3.4 gm/dL      A/G Ratio 1.3 g/dL      BUN/Creatinine Ratio 14.0     Anion Gap 15.7 mmol/L      eGFR 91.1 mL/min/1.73     Narrative:      GFR Categories in Chronic Kidney Disease (CKD)      GFR Category          GFR (mL/min/1.73)    Interpretation  G1                     90 or greater         Normal or high (1)  G2                      60-89                Mild decrease (1)  G3a                   45-59                Mild to moderate decrease  G3b                   30-44                Moderate to severe decrease  G4                    15-29                Severe decrease  G5                    14 or less           Kidney failure          (1)In the absence of evidence of kidney disease, neither GFR category G1 or G2 fulfill the criteria for CKD.    eGFR calculation 2021 CKD-EPI creatinine equation, which does not include race as a factor    CBC & Differential [[id number]]  (Normal) Collected: 03/17/25 1535    Specimen: Blood from Arm, Left Updated: 03/17/25 1544    Narrative:      The following orders were created for panel order CBC & Differential.  Procedure                               Abnormality         Status                     ---------                               -----------         ------                     CBC Auto Differential[[id number]]        Normal              Final result                 Please view results for these tests on the individual orders.    CBC Auto Differential [[id number]]  (Normal) Collected: 03/17/25 1535    Specimen: Blood from Arm, Left Updated: 03/17/25 1544     WBC 9.29 10*3/mm3      RBC 4.91 10*6/mm3      Hemoglobin 13.5 g/dL      Hematocrit 42.4 %      MCV 86.4 fL      MCH 27.5 pg      MCHC 31.8 g/dL      RDW 13.8 %      RDW-SD 43.1 fl      MPV 9.9 fL      Platelets 390 10*3/mm3      Neutrophil % 64.8 %      Lymphocyte % 24.0 %      Monocyte % 7.3 %      Eosinophil % 2.6 %      Basophil % 0.8 %      Immature Grans % 0.5 %      Neutrophils,  Absolute 6.02 10*3/mm3      Lymphocytes, Absolute 2.23 10*3/mm3      Monocytes, Absolute 0.68 10*3/mm3      Eosinophils, Absolute 0.24 10*3/mm3      Basophils, Absolute 0.07 10*3/mm3      Immature Grans, Absolute 0.05 10*3/mm3      nRBC 0.0 /100 WBC              Imaging Results (Last 24 Hours)       Procedure Component Value Units Date/Time    CT Head Without Contrast [[id number]] Collected: 03/17/25 1805     Updated: 03/17/25 1808    Narrative:      CT HEAD WO CONTRAST    Date of Exam: 3/17/2025 5:38 PM EDT    Indication: fall.    Comparison: 6/14/2024    Technique: Axial CT images were obtained of the head without contrast administration.  Coronal reconstructions were performed.  Automated exposure control and iterative reconstruction methods were used.    Findings: No intracranial hemorrhage. Gray-white matter differentiation is maintained without evidence of an acute infarction. Multiple foci of decreased attenuation are present within the subcortical, deep cerebral, and periventricular white matter   consistent with chronic small vessel/microangiopathic ischemic changes. No extra-axial mass or collection. The ventricles and sulci are prominent commensurate with involutional changes. The posterior fossa appears grossly normal. Sellar and suprasellar   structures are normal.    Orbital and periorbital soft tissues are normal. The paranasal sinuses, ethmoid air cells, and mastoid air cells are aerated. The bony calvarium is intact.      Impression:      Impression: No acute intracranial pathology.          Electronically Signed: Zenon Rodriguez MD    3/17/2025 6:06 PM EDT    Workstation ID: [id number]    MRI Lumbar Spine With & Without Contrast [[id number]] Collected: 03/17/25 1733     Updated: 03/17/25 1754    Narrative:      MRI LUMBAR SPINE W WO CONTRAST    Date of Exam: 3/17/2025 4:47 PM EDT    Indication: Fall 3/13, worsening pain and loss of bladder control since fall.     Comparison: MR lumbar spine  6/15/2024    Technique:  Routine multiplanar/multisequence sequence images of the lumbar spine were obtained before and after the uneventful administration of Prohance.        Findings:  Vertebral numbering: The last well formed disc is labeled L5-S1.    Alignment: Mild retrolisthesis of L5 on S1.    Vertebrae: Interval acute/subacute mild compression fractures at the inferior endplate of L3 and the superior endplate of L5 without significant osseous retropulsion. Mild associated bone marrow edema and enhancement. Multilevel degenerative endplate   changes. No suspicious appearing marrow lesions.     Discs and spinal canal: Multilevel disc desiccation and disc height loss. Degenerative changes detailed below by level. There is increased prominence of the ventral epidural space from L3-4 through the sacrum with both STIR hyper and hypointense signal,   suspect mostly combination of epidural fat and prominence of epidural venous plexus though a small epidural hematoma in setting of trauma is not excluded.    Spinal cord and cauda equina: The visible spinal cord has normal signal and morphology. No cauda equina compression. The conus tip lies at L1.    Adjacent soft tissues: No significant abnormality.    Findings by level:    T12-L1: No significant interval change. No significant posterior disc bulge. Moderate left and mild right facet arthropathy. Ligamentum flavum thickening on the left. No significant spinal canal stenosis. No significant foraminal narrowing.    L1-2: No significant interval change. Mild diffuse disc bulge. Moderate bilateral facet arthropathy with ligamentum flavum thickening. No significant spinal canal stenosis. Mild bilateral foraminal narrowing.    L2-3: No significant interval change. Diffuse disc bulge with annular fissuring. Moderate bilateral facet arthropathy with ligamentum flavum thickening and likely degenerative small bilateral facet effusions. Mild spinal canal stenosis. Mild  bilateral   foraminal narrowing.    L3-4: No significant interval change. Diffuse disc bulge. Severe bilateral facet arthropathy with ligamentum flavum thickening and similar likely degenerative bilateral facet effusions. Moderate spinal canal stenosis. Moderate to severe bilateral   foraminal narrowing. At L3 disc level, mildly increased, mild to moderate spinal canal stenosis due to epidural process as above.    L4-5: No significant interval change. Diffuse disc bulge. Severe bilateral facet arthropathy with ligamentum flavum thickening and likely degenerative small bilateral facet effusions. Mild spinal canal stenosis. Mild to moderate bilateral foraminal   narrowing.    L5-S1: Mildly increased, mild spinal canal stenosis due to epidural process as above. Otherwise, no significant interval change. Diffuse disc bulge with disc uncovering and annular fissuring. Moderate bilateral facet arthropathy. Mild bilateral foraminal   narrowing.      Impression:      Impression:  Interval acute/subacute mild compression fractures at the inferior endplate of L3 and the superior endplate of L5 without significant osseous retropulsion.    Increased prominence of the ventral epidural space from L3-4 through the sacrum, suspect mostly combination of epidural fat and prominence of epidural venous plexus though a small superimposed epidural hematoma in setting of trauma is not excluded.    No significant interval change in multilevel degenerative changes, most pronounced at L3-4, where there is moderate spinal canal stenosis and moderate to severe bilateral foraminal narrowing.        Electronically Signed: Parveen Ramirez    3/17/2025 5:52 PM EDT    Workstation ID: [id number]              Assessment & Plan        This is a 64 y.o. male with PMH of HTN, GERD, DM type 2, HLD, asthma, CVA, chronic back pain who presented to ER for worsening lower back pain after falling off utility trailer on 3/13. Initial scans negative at OSH on  3/13. Over past 2 days with new urinary incontinence, difficulty ambulating, pain radiating into BLE. Work up reveals acute/subacute lumbar compression fractures with concern for superimposed epidural hematoma. Will be admitted with neurosurgery consult         Active and Resolved Problems  Active Hospital Problems    Diagnosis  POA    **Lumbar compression fracture [S32.000A]  Yes      Resolved Hospital Problems   No resolved problems to display.     Lumbar compression fracture:  -s/p fall off utility trailer on 3/13. OSH images negative on 3/13. Discharge home with flexeril.   -over past 2 days with new urinary incontinence, difficulty ambulating, worsening pain to lower back and BLE.   -MRI: acute/subacute mild compression fractures at inferior endplate L3 and superior end place L5 without significant osseous retropulsion. Increased prominence of ventral epidural space from L 3-4 through the sacrum, cannot rule out small superimposed epidural hematoma.  -neurosurgery consult in AM  -bedrest until evaluated by neurosurgery  -neurovascular checks BLE every 4 hours.   -pain control with oxycodone and dilaudid. Adjust as needed.   -NPO after MN.   -will need PT/OT evaluation/treatment after neurosurgery evaluation.   -fall precautions.     Diabetes mellitus type 2 with HLD:  -accu check AC/HS with SSI and hypoglycemic protocols  -ADA diabetic diet  -A1c was 9.3% on 1/24/2025  -continue statin  -holding farxiga, metformin  -continue lantus decreasing dose to 40 units BID until diet fully resumes.     Hypertension:  -trend BP  -BP may be labile with pain response.   -continue home antihypertensive regimen once medications verified by pharmacy  -holding HCTZ while on IVFs.     GERD:  -Protonix for GI Ppx.         VTE Prophylaxis:  Mechanical VTE prophylaxis orders are present.        The patient desires to be as follows:    CODE STATUS:    Code Status (Patient has no pulse and is not breathing): CPR (Attempt to  Resuscitate)  Medical Interventions (Patient has pulse or is breathing): Full Support  Level Of Support Discussed With: Patient        Kenn Harrell, wife, who can be contacted at [phone], is the designated person to make medical decisions on the patient's behalf if He is incapable of doing so. This was clarified with patient and/or next of kin on 3/17/2025 during the course of this H&P.    Admission Status:  I believe this patient meets IP status.    Expected Length of Stay: 3 days    PDMP and Medication Dispenses via Sidebar reviewed and consistent with patient reported medications.    I discussed the patient's findings and my recommendations with patient, family, and nursing staff.      Signature:     This document has been electronically signed by JESSICA Saleh on March 17, 2025 20:56 EDT   [facility], operated by [facility] Hospitalist Team

## 2025-03-19 LAB
ANION GAP SERPL CALCULATED.3IONS-SCNC: 9.9 MMOL/L (ref 5–15)
BASOPHILS # BLD AUTO: 0.05 10*3/MM3 (ref 0–0.2)
BASOPHILS NFR BLD AUTO: 0.6 % (ref 0–1.5)
BUN SERPL-MCNC: 14 MG/DL (ref 8–23)
BUN/CREAT SERPL: 16.5 (ref 7–25)
C AURIS DNA SPEC QL NAA+NON-PROBE: NOT DETECTED
CALCIUM SPEC-SCNC: 9.3 MG/DL (ref 8.6–10.5)
CHLORIDE SERPL-SCNC: 99 MMOL/L (ref 98–107)
CO2 SERPL-SCNC: 27.1 MMOL/L (ref 22–29)
CREAT SERPL-MCNC: 0.85 MG/DL (ref 0.76–1.27)
DEPRECATED RDW RBC AUTO: 44.4 FL (ref 37–54)
EGFRCR SERPLBLD CKD-EPI 2021: 96.4 ML/MIN/1.73
EOSINOPHIL # BLD AUTO: 0.14 10*3/MM3 (ref 0–0.4)
EOSINOPHIL NFR BLD AUTO: 1.6 % (ref 0.3–6.2)
ERYTHROCYTE [DISTWIDTH] IN BLOOD BY AUTOMATED COUNT: 13.8 % (ref 12.3–15.4)
GLUCOSE BLDC GLUCOMTR-MCNC: 184 MG/DL (ref 70–105)
GLUCOSE BLDC GLUCOMTR-MCNC: 194 MG/DL (ref 70–105)
GLUCOSE BLDC GLUCOMTR-MCNC: 199 MG/DL (ref 70–105)
GLUCOSE BLDC GLUCOMTR-MCNC: 229 MG/DL (ref 70–105)
GLUCOSE SERPL-MCNC: 154 MG/DL (ref 65–99)
HCT VFR BLD AUTO: 38.3 % (ref 37.5–51)
HGB BLD-MCNC: 12 G/DL (ref 13–17.7)
IMM GRANULOCYTES # BLD AUTO: 0.03 10*3/MM3 (ref 0–0.05)
IMM GRANULOCYTES NFR BLD AUTO: 0.4 % (ref 0–0.5)
LYMPHOCYTES # BLD AUTO: 1.65 10*3/MM3 (ref 0.7–3.1)
LYMPHOCYTES NFR BLD AUTO: 19.3 % (ref 19.6–45.3)
MAGNESIUM SERPL-MCNC: 2 MG/DL (ref 1.6–2.4)
MCH RBC QN AUTO: 27.7 PG (ref 26.6–33)
MCHC RBC AUTO-ENTMCNC: 31.3 G/DL (ref 31.5–35.7)
MCV RBC AUTO: 88.5 FL (ref 79–97)
MONOCYTES # BLD AUTO: 1.05 10*3/MM3 (ref 0.1–0.9)
MONOCYTES NFR BLD AUTO: 12.3 % (ref 5–12)
NEUTROPHILS NFR BLD AUTO: 5.65 10*3/MM3 (ref 1.7–7)
NEUTROPHILS NFR BLD AUTO: 65.8 % (ref 42.7–76)
NRBC BLD AUTO-RTO: 0 /100 WBC (ref 0–0.2)
PLATELET # BLD AUTO: 322 10*3/MM3 (ref 140–450)
PMV BLD AUTO: 9.9 FL (ref 6–12)
POTASSIUM SERPL-SCNC: 4 MMOL/L (ref 3.5–5.2)
RBC # BLD AUTO: 4.33 10*6/MM3 (ref 4.14–5.8)
SODIUM SERPL-SCNC: 136 MMOL/L (ref 136–145)
WBC NRBC COR # BLD AUTO: 8.57 10*3/MM3 (ref 3.4–10.8)

## 2025-03-19 PROCEDURE — 63710000001 INSULIN GLARGINE PER 5 UNITS: Performed by: NURSE PRACTITIONER

## 2025-03-19 PROCEDURE — 82948 REAGENT STRIP/BLOOD GLUCOSE: CPT | Performed by: NURSE PRACTITIONER

## 2025-03-19 PROCEDURE — 63710000001 INSULIN LISPRO (HUMAN) PER 5 UNITS: Performed by: NURSE PRACTITIONER

## 2025-03-19 PROCEDURE — 85025 COMPLETE CBC W/AUTO DIFF WBC: CPT | Performed by: NURSE PRACTITIONER

## 2025-03-19 PROCEDURE — 82948 REAGENT STRIP/BLOOD GLUCOSE: CPT

## 2025-03-19 PROCEDURE — 80048 BASIC METABOLIC PNL TOTAL CA: CPT | Performed by: NURSE PRACTITIONER

## 2025-03-19 PROCEDURE — 83735 ASSAY OF MAGNESIUM: CPT | Performed by: NURSE PRACTITIONER

## 2025-03-19 PROCEDURE — 25010000002 HYDROMORPHONE PER 4 MG: Performed by: NURSE PRACTITIONER

## 2025-03-19 PROCEDURE — 25010000002 ONDANSETRON PER 1 MG: Performed by: NURSE PRACTITIONER

## 2025-03-19 RX ORDER — NALOXONE HCL 0.4 MG/ML
0.4 VIAL (ML) INJECTION
Status: DISCONTINUED | OUTPATIENT
Start: 2025-03-19 | End: 2025-03-22 | Stop reason: HOSPADM

## 2025-03-19 RX ORDER — OXYCODONE HYDROCHLORIDE 5 MG/1
10 TABLET ORAL EVERY 4 HOURS PRN
Refills: 0 | Status: DISCONTINUED | OUTPATIENT
Start: 2025-03-19 | End: 2025-03-22 | Stop reason: HOSPADM

## 2025-03-19 RX ADMIN — AMLODIPINE BESYLATE 10 MG: 5 TABLET ORAL at 08:18

## 2025-03-19 RX ADMIN — FINASTERIDE 5 MG: 5 TABLET, FILM COATED ORAL at 08:18

## 2025-03-19 RX ADMIN — OXYCODONE HYDROCHLORIDE 5 MG: 5 TABLET ORAL at 08:30

## 2025-03-19 RX ADMIN — INSULIN LISPRO 2 UNITS: 100 INJECTION, SOLUTION INTRAVENOUS; SUBCUTANEOUS at 11:29

## 2025-03-19 RX ADMIN — PANTOPRAZOLE SODIUM 40 MG: 40 INJECTION, POWDER, LYOPHILIZED, FOR SOLUTION INTRAVENOUS at 06:03

## 2025-03-19 RX ADMIN — INSULIN LISPRO 2 UNITS: 100 INJECTION, SOLUTION INTRAVENOUS; SUBCUTANEOUS at 17:45

## 2025-03-19 RX ADMIN — TAMSULOSIN HYDROCHLORIDE 0.8 MG: 0.4 CAPSULE ORAL at 08:18

## 2025-03-19 RX ADMIN — Medication 10 ML: at 08:19

## 2025-03-19 RX ADMIN — DULOXETINE 60 MG: 30 CAPSULE, DELAYED RELEASE ORAL at 20:15

## 2025-03-19 RX ADMIN — CARVEDILOL 12.5 MG: 6.25 TABLET, FILM COATED ORAL at 17:45

## 2025-03-19 RX ADMIN — Medication 10 ML: at 20:15

## 2025-03-19 RX ADMIN — INSULIN GLARGINE 40 UNITS: 100 INJECTION, SOLUTION SUBCUTANEOUS at 17:45

## 2025-03-19 RX ADMIN — OXYCODONE HYDROCHLORIDE 10 MG: 5 TABLET ORAL at 19:08

## 2025-03-19 RX ADMIN — ATORVASTATIN CALCIUM 20 MG: 20 TABLET, FILM COATED ORAL at 08:18

## 2025-03-19 RX ADMIN — HYDROMORPHONE HYDROCHLORIDE 0.5 MG: 1 INJECTION, SOLUTION INTRAMUSCULAR; INTRAVENOUS; SUBCUTANEOUS at 06:03

## 2025-03-19 RX ADMIN — INSULIN LISPRO 3 UNITS: 100 INJECTION, SOLUTION INTRAVENOUS; SUBCUTANEOUS at 21:43

## 2025-03-19 RX ADMIN — INSULIN LISPRO 2 UNITS: 100 INJECTION, SOLUTION INTRAVENOUS; SUBCUTANEOUS at 08:18

## 2025-03-19 RX ADMIN — HYDROMORPHONE HYDROCHLORIDE 0.5 MG: 1 INJECTION, SOLUTION INTRAMUSCULAR; INTRAVENOUS; SUBCUTANEOUS at 14:11

## 2025-03-19 RX ADMIN — HYDROMORPHONE HYDROCHLORIDE 0.5 MG: 1 INJECTION, SOLUTION INTRAMUSCULAR; INTRAVENOUS; SUBCUTANEOUS at 01:06

## 2025-03-19 RX ADMIN — CARVEDILOL 12.5 MG: 6.25 TABLET, FILM COATED ORAL at 08:18

## 2025-03-19 RX ADMIN — DULOXETINE 60 MG: 30 CAPSULE, DELAYED RELEASE ORAL at 08:18

## 2025-03-19 RX ADMIN — INSULIN GLARGINE 40 UNITS: 100 INJECTION, SOLUTION SUBCUTANEOUS at 08:18

## 2025-03-19 RX ADMIN — ONDANSETRON 4 MG: 2 INJECTION, SOLUTION INTRAMUSCULAR; INTRAVENOUS at 14:11

## 2025-03-19 NOTE — SIGNIFICANT NOTE
03/19/25 0818   OTHER   Discipline occupational therapist   Rehab Time/Intention   Session Not Performed other (see comments)  (pt schedueld for kyphoplasty 3/21, will complete orders and follow up post-op. Please re-consult after sx)

## 2025-03-19 NOTE — PROGRESS NOTES
[facility] MEDICINE SERVICE  DAILY PROGRESS NOTE    NAME: Matthieu Hawk  : 1960  MRN: [id number]      LOS: 2 days     PROVIDER OF SERVICE: JESSICA Herbert    Chief Complaint: Lumbar compression fracture    Subjective:     Interval History:  History taken from: patient chart    Patient lying in bed.  Pain is improved with pain medication.  All questions and concerns addressed.    Review of Systems:   Please see above, review of systems otherwise negative     Objective:     Vital Signs  Temp:  [97.8 °F (36.6 °C)-98.5 °F (36.9 °C)] 97.8 °F (36.6 °C)  Heart Rate:  [] 101  Resp:  [14-19] 14  BP: (116-146)/(62-86) 135/80  Flow (L/min) (Oxygen Therapy):  [2] 2   Body mass index is 44.48 kg/m².    Physical Exam    PHYSICAL EXAM  Constitutional:  Well-developed, well-nourished, no acute distress, nontoxic appearance   Eyes:  PERRL, conjunctivae normal, EOMI   HENT:  Atraumatic, external ears normal, nose normal, oropharynx moist, no pharyngeal exudates. Neck-normal range of motion, no tenderness, supple, trachea midline  Respiratory:  ctab, nonlabored respirations without accessory muscle use  Cardiovascular:  Normal rate, normal rhythm, no murmurs, no gallops, no rubs   GI:  Soft, nondistended, normal bowel sounds, nontender, no organomegaly, no mass, no rebound, no guarding   Musculoskeletal:  No edema, no tenderness, no deformities Difficulty raising RLE off bed.   Denies sensation changes, numbness or tingling to BLE. Tenderness to lumbar spine.  Integument:  Well hydrated, no rash   Neurologic:  Alert & oriented x 3, CN 2-12 normal, normal motor function, normal sensory function, no focal deficits noted   Psychiatric:  Speech and behavior appropriate       Diagnostic Data    Results from last 7 days   Lab Units 25  0114 25  0124 25  1535   WBC 10*3/mm3 8.57   < > 9.29   HEMOGLOBIN g/dL 12.0*   < > 13.5   HEMATOCRIT % 38.3   < > 42.4   PLATELETS 10*3/mm3 322   < > 390    GLUCOSE mg/dL 154*   < > 174*   CREATININE mg/dL 0.85   < > 0.93   BUN mg/dL 14   < > 13   SODIUM mmol/L 136   < > 140   POTASSIUM mmol/L 4.0   < > 4.0   AST (SGOT) U/L  --   --  18   ALT (SGPT) U/L  --   --  8   ALK PHOS U/L  --   --  72   BILIRUBIN mg/dL  --   --  0.4   ANION GAP mmol/L 9.9   < > 15.7*    < > = values in this interval not displayed.       CT Head Without Contrast  Result Date: 3/17/2025  Impression: No acute intracranial pathology. Electronically Signed: Zenon Rodriguez MD  3/17/2025 6:06 PM EDT  Workstation ID: [id number]    MRI Lumbar Spine With & Without Contrast  Result Date: 3/17/2025  Impression: Interval acute/subacute mild compression fractures at the inferior endplate of L3 and the superior endplate of L5 without significant osseous retropulsion. Increased prominence of the ventral epidural space from L3-4 through the sacrum, suspect mostly combination of epidural fat and prominence of epidural venous plexus though a small superimposed epidural hematoma in setting of trauma is not excluded. No significant interval change in multilevel degenerative changes, most pronounced at L3-4, where there is moderate spinal canal stenosis and moderate to severe bilateral foraminal narrowing. Electronically Signed: Parveen Ramirez  3/17/2025 5:52 PM EDT  Workstation ID: [id number]        I reviewed the patient's new clinical results.  I reviewed the patient's new imaging results and agree with the interpretation.    Assessment/Plan:     Active and Resolved Problems  Active Hospital Problems    Diagnosis  POA    **Lumbar compression fracture [S32.000A]  Yes    Compression fracture of L3 lumbar vertebra, closed, initial encounter [S32.030A]  Unknown    Compression fracture of L5 lumbar vertebra, closed, initial encounter [S32.050A]  Unknown      Resolved Hospital Problems   No resolved problems to display.       Lumbar compression fracture:  -s/p fall off utility trailer on 3/13. OSH images negative on 3/13.  Discharge home with flexeril.   -over past 2 days with new urinary incontinence, difficulty ambulating, worsening pain to lower back and BLE.   -MRI: acute/subacute mild compression fractures at inferior endplate L3 and superior end place L5 without significant osseous retropulsion. Increased prominence of ventral epidural space from L 3-4 through the sacrum, cannot rule out small superimposed epidural hematoma.  -bedrest until evaluated by neurosurgery  -neurovascular checks BLE every 4 hours.   -pain control with oxycodone and dilaudid. Adjust as needed.   -NPO after MN.   -will need PT/OT evaluation/treatment after pain management procedure  -fall precautions.   -Neurosurgery following patient.  Recommends pain management consult     Diabetes mellitus type 2 with HLD:  -accu check AC/HS with SSI and hypoglycemic protocols  -CCD  -A1c was 9.3% on 1/24/2025  -continue statin  -holding farxiga, metformin  -continue lantus decreasing dose to 40 units BID until diet fully resumes.      Hypertension:  -trend BP  -BP may be labile with pain response.   -continue home antihypertensive regimen once medications verified by pharmacy  -holding HCTZ while on IVFs.      GERD:  -Protonix for GI Ppx.     Patient was seen and evaluated at bedside.  Neurosurgery has also evaluated this patient, recommended pain management consult for kyphoplasty.  Will await for pain management recommendations.  Will also have PT OT evaluate and appreciate their recommendations plan is for patient to undergo kyphoplasty on Friday, 3/21/2025    VTE Prophylaxis:  Mechanical VTE prophylaxis orders are present.             Disposition Planning:     Barriers to Discharge: Consults  Anticipated Date of Discharge: 1/19/2025  Place of Discharge: Home vs rehab      Time: 30 minutes     Code Status and Medical Interventions: CPR (Attempt to Resuscitate); Full Support   Ordered at: 03/17/25 2008     Code Status (Patient has no pulse and is not breathing):     CPR (Attempt to Resuscitate)     Medical Interventions (Patient has pulse or is breathing):    Full Support     Level Of Support Discussed With:    Patient       Signature: Electronically signed by JESSICA Herbert, 03/19/25, 10:24 EDT.  [facility] Hospitalist Team

## 2025-03-19 NOTE — PLAN OF CARE
Problem: Skin Injury Risk Increased  Goal: Skin Health and Integrity  Outcome: Not Progressing  Intervention: Optimize Skin Protection  Recent Flowsheet Documentation  Taken 3/19/2025 0819 by Kelvin Su RN  Activity Management: activity minimized  Pressure Reduction Techniques:   frequent weight shift encouraged   weight shift assistance provided  Pressure Reduction Devices: pressure-redistributing mattress utilized  Skin Protection:   incontinence pads utilized   transparent dressing maintained     Problem: Fall Injury Risk  Goal: Absence of Fall and Fall-Related Injury  Outcome: Not Progressing  Intervention: Identify and Manage Contributors  Recent Flowsheet Documentation  Taken 3/19/2025 0819 by Kelvin Su RN  Medication Review/Management: medications reviewed  Intervention: Promote Injury-Free Environment  Recent Flowsheet Documentation  Taken 3/19/2025 1018 by Kelvin Su RN  Safety Promotion/Fall Prevention: safety round/check completed  Taken 3/19/2025 1011 by Kelvin Su RN  Safety Promotion/Fall Prevention: safety round/check completed  Taken 3/19/2025 0819 by Kelvin Su, RN  Safety Promotion/Fall Prevention: safety round/check completed   Goal Outcome Evaluation:

## 2025-03-19 NOTE — CASE MANAGEMENT/SOCIAL WORK
Continued Stay Note  SHANT Valente     Patient Name: Matthieu Hawk  MRN: [id number]  Today's Date: 3/19/2025    Admit Date: 3/17/2025    Plan: From home with wife. Pending PT/OT evals post Kyphoplasty 3/21.   Discharge Plan       Row Name 03/19/25 1545       Plan    Plan From home with wife. Pending PT/OT evals post Kyphoplasty 3/21.    Patient/Family in Agreement with Plan yes    Plan Comments Scheduled for Kyphoplasty 3/21. Plans for PT/OT to eval post op. D/C Barriers: pain control, 2L O2, awaiting surgery             Antonietta De La Cruz RN     Office: [phone]

## 2025-03-19 NOTE — PLAN OF CARE
Goal Outcome Evaluation:              Pt continues with prn pain medication.  Awaiting procedure.  Will continue to monitor.

## 2025-03-19 NOTE — SIGNIFICANT NOTE
03/19/25 0847   OTHER   Discipline physical therapist   Rehab Time/Intention   Session Not Performed other (see comments)  (Pt schedueld for kyphoplasty 3/21, will complete orders and follow up post-op. Please re-consult after sx.)   Therapy Assessment/Plan (PT)   Criteria for Skilled Interventions Met (PT) yes   Recommendation   PT - Next Appointment 03/21/25

## 2025-03-20 ENCOUNTER — ANESTHESIA EVENT (OUTPATIENT)
Dept: PERIOP | Facility: HOSPITAL | Age: 65
End: 2025-03-20
Payer: MEDICARE

## 2025-03-20 LAB
ANION GAP SERPL CALCULATED.3IONS-SCNC: 8.5 MMOL/L (ref 5–15)
BASOPHILS # BLD AUTO: 0.04 10*3/MM3 (ref 0–0.2)
BASOPHILS NFR BLD AUTO: 0.5 % (ref 0–1.5)
BUN SERPL-MCNC: 14 MG/DL (ref 8–23)
BUN/CREAT SERPL: 18.9 (ref 7–25)
CALCIUM SPEC-SCNC: 9.3 MG/DL (ref 8.6–10.5)
CHLORIDE SERPL-SCNC: 98 MMOL/L (ref 98–107)
CO2 SERPL-SCNC: 29.5 MMOL/L (ref 22–29)
CREAT SERPL-MCNC: 0.74 MG/DL (ref 0.76–1.27)
DEPRECATED RDW RBC AUTO: 43.8 FL (ref 37–54)
EGFRCR SERPLBLD CKD-EPI 2021: 100.6 ML/MIN/1.73
EOSINOPHIL # BLD AUTO: 0.45 10*3/MM3 (ref 0–0.4)
EOSINOPHIL NFR BLD AUTO: 5.5 % (ref 0.3–6.2)
ERYTHROCYTE [DISTWIDTH] IN BLOOD BY AUTOMATED COUNT: 13.5 % (ref 12.3–15.4)
GLUCOSE BLDC GLUCOMTR-MCNC: 201 MG/DL (ref 70–105)
GLUCOSE BLDC GLUCOMTR-MCNC: 205 MG/DL (ref 70–105)
GLUCOSE BLDC GLUCOMTR-MCNC: 206 MG/DL (ref 70–105)
GLUCOSE BLDC GLUCOMTR-MCNC: 225 MG/DL (ref 70–105)
GLUCOSE SERPL-MCNC: 214 MG/DL (ref 65–99)
HCT VFR BLD AUTO: 39.5 % (ref 37.5–51)
HGB BLD-MCNC: 12.4 G/DL (ref 13–17.7)
IMM GRANULOCYTES # BLD AUTO: 0.03 10*3/MM3 (ref 0–0.05)
IMM GRANULOCYTES NFR BLD AUTO: 0.4 % (ref 0–0.5)
LYMPHOCYTES # BLD AUTO: 1.99 10*3/MM3 (ref 0.7–3.1)
LYMPHOCYTES NFR BLD AUTO: 24.3 % (ref 19.6–45.3)
MAGNESIUM SERPL-MCNC: 2 MG/DL (ref 1.6–2.4)
MCH RBC QN AUTO: 27.6 PG (ref 26.6–33)
MCHC RBC AUTO-ENTMCNC: 31.4 G/DL (ref 31.5–35.7)
MCV RBC AUTO: 88 FL (ref 79–97)
MONOCYTES # BLD AUTO: 0.98 10*3/MM3 (ref 0.1–0.9)
MONOCYTES NFR BLD AUTO: 12 % (ref 5–12)
NEUTROPHILS NFR BLD AUTO: 4.71 10*3/MM3 (ref 1.7–7)
NEUTROPHILS NFR BLD AUTO: 57.3 % (ref 42.7–76)
NRBC BLD AUTO-RTO: 0 /100 WBC (ref 0–0.2)
PLATELET # BLD AUTO: 331 10*3/MM3 (ref 140–450)
PMV BLD AUTO: 10.1 FL (ref 6–12)
POTASSIUM SERPL-SCNC: 4.2 MMOL/L (ref 3.5–5.2)
RBC # BLD AUTO: 4.49 10*6/MM3 (ref 4.14–5.8)
SODIUM SERPL-SCNC: 136 MMOL/L (ref 136–145)
WBC NRBC COR # BLD AUTO: 8.2 10*3/MM3 (ref 3.4–10.8)

## 2025-03-20 PROCEDURE — 82948 REAGENT STRIP/BLOOD GLUCOSE: CPT

## 2025-03-20 PROCEDURE — 82948 REAGENT STRIP/BLOOD GLUCOSE: CPT | Performed by: NURSE PRACTITIONER

## 2025-03-20 PROCEDURE — 80048 BASIC METABOLIC PNL TOTAL CA: CPT | Performed by: NURSE PRACTITIONER

## 2025-03-20 PROCEDURE — 63710000001 INSULIN GLARGINE PER 5 UNITS: Performed by: NURSE PRACTITIONER

## 2025-03-20 PROCEDURE — 63710000001 INSULIN LISPRO (HUMAN) PER 5 UNITS: Performed by: NURSE PRACTITIONER

## 2025-03-20 PROCEDURE — 83735 ASSAY OF MAGNESIUM: CPT | Performed by: NURSE PRACTITIONER

## 2025-03-20 PROCEDURE — 25010000002 HYDROMORPHONE 1 MG/ML SOLUTION: Performed by: FAMILY MEDICINE

## 2025-03-20 PROCEDURE — 85025 COMPLETE CBC W/AUTO DIFF WBC: CPT | Performed by: NURSE PRACTITIONER

## 2025-03-20 RX ADMIN — INSULIN LISPRO 3 UNITS: 100 INJECTION, SOLUTION INTRAVENOUS; SUBCUTANEOUS at 08:17

## 2025-03-20 RX ADMIN — OXYCODONE HYDROCHLORIDE 10 MG: 5 TABLET ORAL at 08:17

## 2025-03-20 RX ADMIN — AMLODIPINE BESYLATE 10 MG: 5 TABLET ORAL at 08:17

## 2025-03-20 RX ADMIN — INSULIN LISPRO 3 UNITS: 100 INJECTION, SOLUTION INTRAVENOUS; SUBCUTANEOUS at 21:03

## 2025-03-20 RX ADMIN — Medication 10 ML: at 08:17

## 2025-03-20 RX ADMIN — HYDROMORPHONE HYDROCHLORIDE 1 MG: 1 INJECTION, SOLUTION INTRAMUSCULAR; INTRAVENOUS; SUBCUTANEOUS at 21:03

## 2025-03-20 RX ADMIN — INSULIN LISPRO 3 UNITS: 100 INJECTION, SOLUTION INTRAVENOUS; SUBCUTANEOUS at 11:29

## 2025-03-20 RX ADMIN — HYDROMORPHONE HYDROCHLORIDE 1 MG: 1 INJECTION, SOLUTION INTRAMUSCULAR; INTRAVENOUS; SUBCUTANEOUS at 00:38

## 2025-03-20 RX ADMIN — INSULIN GLARGINE 40 UNITS: 100 INJECTION, SOLUTION SUBCUTANEOUS at 17:06

## 2025-03-20 RX ADMIN — Medication 10 ML: at 21:03

## 2025-03-20 RX ADMIN — CARVEDILOL 12.5 MG: 6.25 TABLET, FILM COATED ORAL at 17:06

## 2025-03-20 RX ADMIN — FINASTERIDE 5 MG: 5 TABLET, FILM COATED ORAL at 08:17

## 2025-03-20 RX ADMIN — DULOXETINE 60 MG: 30 CAPSULE, DELAYED RELEASE ORAL at 08:17

## 2025-03-20 RX ADMIN — OXYCODONE HYDROCHLORIDE 10 MG: 5 TABLET ORAL at 15:08

## 2025-03-20 RX ADMIN — INSULIN LISPRO 3 UNITS: 100 INJECTION, SOLUTION INTRAVENOUS; SUBCUTANEOUS at 17:06

## 2025-03-20 RX ADMIN — INSULIN GLARGINE 40 UNITS: 100 INJECTION, SOLUTION SUBCUTANEOUS at 08:17

## 2025-03-20 RX ADMIN — CARVEDILOL 12.5 MG: 6.25 TABLET, FILM COATED ORAL at 08:17

## 2025-03-20 RX ADMIN — TAMSULOSIN HYDROCHLORIDE 0.8 MG: 0.4 CAPSULE ORAL at 08:17

## 2025-03-20 RX ADMIN — DULOXETINE 60 MG: 30 CAPSULE, DELAYED RELEASE ORAL at 21:03

## 2025-03-20 RX ADMIN — ATORVASTATIN CALCIUM 20 MG: 20 TABLET, FILM COATED ORAL at 08:17

## 2025-03-20 NOTE — PROGRESS NOTES
[facility] MEDICINE SERVICE  DAILY PROGRESS NOTE    NAME: Matthieu Hawk  : 1960  MRN: [id number]      LOS: 3 days     PROVIDER OF SERVICE: JESSICA Herbert    Chief Complaint: Lumbar compression fracture    Subjective:     Interval History:  History taken from: patient chart    Patient lying in bed.  Pain is improved with pain medication.  All questions and concerns addressed.    Review of Systems:   Please see above, review of systems otherwise negative     Objective:     Vital Signs  Temp:  [97.8 °F (36.6 °C)-98.5 °F (36.9 °C)] 97.9 °F (36.6 °C)  Heart Rate:  [77-89] 82  Resp:  [12-18] 12  BP: (110-149)/(63-82) 117/71  Flow (L/min) (Oxygen Therapy):  [2] 2   Body mass index is 44.34 kg/m².    Physical Exam    PHYSICAL EXAM  Constitutional:  Well-developed, well-nourished, no acute distress, nontoxic appearance   Eyes:  PERRL, conjunctivae normal, EOMI   HENT:  Atraumatic, external ears normal, nose normal, oropharynx moist, no pharyngeal exudates. Neck-normal range of motion, no tenderness, supple, trachea midline  Respiratory:  ctab, nonlabored respirations without accessory muscle use  Cardiovascular:  Normal rate, normal rhythm, no murmurs, no gallops, no rubs   GI:  Soft, nondistended, normal bowel sounds, nontender, no organomegaly, no mass, no rebound, no guarding   Musculoskeletal:  No edema, no tenderness, no deformities Difficulty raising RLE off bed.   Denies sensation changes, numbness or tingling to BLE. Tenderness to lumbar spine.  Integument:  Well hydrated, no rash   Neurologic:  Alert & oriented x 3, CN 2-12 normal, normal motor function, normal sensory function, no focal deficits noted   Psychiatric:  Speech and behavior appropriate       Diagnostic Data    Results from last 7 days   Lab Units 25  0257 25  0124 25  1535   WBC 10*3/mm3 8.20   < > 9.29   HEMOGLOBIN g/dL 12.4*   < > 13.5   HEMATOCRIT % 39.5   < > 42.4   PLATELETS 10*3/mm3 331   < > 390    GLUCOSE mg/dL 214*   < > 174*   CREATININE mg/dL 0.74*   < > 0.93   BUN mg/dL 14   < > 13   SODIUM mmol/L 136   < > 140   POTASSIUM mmol/L 4.2   < > 4.0   AST (SGOT) U/L  --   --  18   ALT (SGPT) U/L  --   --  8   ALK PHOS U/L  --   --  72   BILIRUBIN mg/dL  --   --  0.4   ANION GAP mmol/L 8.5   < > 15.7*    < > = values in this interval not displayed.       No radiology results for the last day        I reviewed the patient's new clinical results.  I reviewed the patient's new imaging results and agree with the interpretation.    Assessment/Plan:     Active and Resolved Problems  Active Hospital Problems    Diagnosis  POA    **Lumbar compression fracture [S32.000A]  Yes    Compression fracture of L3 lumbar vertebra, closed, initial encounter [S32.030A]  Unknown    Compression fracture of L5 lumbar vertebra, closed, initial encounter [S32.050A]  Unknown      Resolved Hospital Problems   No resolved problems to display.       Lumbar compression fracture:  -s/p fall off utility trailer on 3/13. OSH images negative on 3/13. Discharge home with flexeril.   -over past 2 days with new urinary incontinence, difficulty ambulating, worsening pain to lower back and BLE.   -MRI: acute/subacute mild compression fractures at inferior endplate L3 and superior end place L5 without significant osseous retropulsion. Increased prominence of ventral epidural space from L 3-4 through the sacrum, cannot rule out small superimposed epidural hematoma.  -bedrest until evaluated by neurosurgery  -neurovascular checks BLE every 4 hours.   -pain control with oxycodone and dilaudid. Adjust as needed.   -NPO after MN.   -will need PT/OT evaluation/treatment after pain management procedure  -fall precautions.   -Neurosurgery following patient.  Recommends pain management consult     Diabetes mellitus type 2 with HLD:  -accu check AC/HS with SSI and hypoglycemic protocols  -CCD  -A1c was 9.3% on 1/24/2025  -continue statin  -holding farxiga,  metformin  -continue lantus decreasing dose to 40 units BID until diet fully resumes.      Hypertension:  -trend BP  -BP may be labile with pain response.   -continue home antihypertensive regimen once medications verified by pharmacy  -holding HCTZ while on IVFs.      GERD:  -Protonix for GI Ppx.     Patient was seen and evaluated at bedside.  Neurosurgery has also evaluated this patient, recommended pain management consult for kyphoplasty.  Pain uncontrolled yesterday, pain medications increased last night.  Patient reports improvement.  Will also have PT OT evaluate and appreciate their recommendations plan is for patient to undergo kyphoplasty on Friday, 3/21/2025    VTE Prophylaxis:  Mechanical VTE prophylaxis orders are present.             Disposition Planning:     Barriers to Discharge: Consults  Anticipated Date of Discharge: 1/23/2025  Place of Discharge: Home vs rehab      Time: 30 minutes     Code Status and Medical Interventions: CPR (Attempt to Resuscitate); Full Support   Ordered at: 03/17/25 2008     Code Status (Patient has no pulse and is not breathing):    CPR (Attempt to Resuscitate)     Medical Interventions (Patient has pulse or is breathing):    Full Support     Level Of Support Discussed With:    Patient       Signature: Electronically signed by JESSICA Herbert, 03/20/25, 10:03 EDT.  [facility] Hospitalist Team

## 2025-03-20 NOTE — H&P
Subjective   Matthieu Hawk is a 64 y.o. male is here for kyphoplasty.  Mild improvement in pain with pain medications but still continues to have severe pain requiring escalating doses of pain medications and restricted movements.        Hx: presented to [facility] ER after falling out of utility trailer on 3/13/2025.  He immediately had increased lower back pain and unfortunately worsened over the next couple days leading to ER on 3/17/2024.  Due to severe pain, patient was unable to walk and has been on escalating doses of pain medications while being inpatient and still unable to move significantly due to pain.  He denies any bowel or bladder incontinence or saddle anesthesia.  MRI lumbar spine was done showing acute compression fracture of L3 and L5 vertebral levels.  Evaluated by neurosurgery and not a surgical candidate.  Unable to tolerate TLSO due to severe pain.  He had been previously seen by outside pain management and had multiple epidurals and MBB's without significant pain relief.     Lower back pain is 9/10 on VAS, at maximum is 10/10. Pain is sharp and stabbing in nature. Pain is referred to bilateral legs but that has been chronic and his main complaint is axial lower back pain. The pain is constant. The pain is improved by some by pain medications. The pain is worse with any small movements including even turning in bed..                   PMH:   Morbid obesity, DM-2, depression, CVA     Current Medications:   Dilaudid 0.5 mg every 3 hours as needed  Roxicodone 5 mg every 4 hours as needed  Cymbalta           PEG Assessment   What number best describes your pain on average in the past week?10  What number best describes how, during the past week, pain has interfered with your enjoyment of life?10  What number best describes how, during the past week, pain has interfered with your general activity?  10       Current Facility-Administered Medications:     [Transfer Hold] acetaminophen (TYLENOL) tablet  650 mg, 650 mg, Oral, Q4H PRN, Carmen Andersen, APRN    amLODIPine (NORVASC) tablet 10 mg, 10 mg, Oral, Q24H, Carmen Andersen APRN, 10 mg at 03/20/25 0817    [Transfer Hold] atorvastatin (LIPITOR) tablet 20 mg, 20 mg, Oral, Daily, Carmen Andersen APRN, 20 mg at 03/20/25 0817    [Transfer Hold] sennosides-docusate (PERICOLACE) 8.6-50 MG per tablet 2 tablet, 2 tablet, Oral, BID PRN **AND** [Transfer Hold] polyethylene glycol (MIRALAX) packet 17 g, 17 g, Oral, Daily PRN **AND** [Transfer Hold] bisacodyl (DULCOLAX) EC tablet 5 mg, 5 mg, Oral, Daily PRN **AND** [Transfer Hold] bisacodyl (DULCOLAX) suppository 10 mg, 10 mg, Rectal, Daily PRN, Carmen Andersen, APRN    carvedilol (COREG) tablet 12.5 mg, 12.5 mg, Oral, BID With Meals, Carmen Andersen APRN, 12.5 mg at 03/20/25 1706    ceFAZolin 3000 mg IVPB in 100 mL NS (MBP), 3,000 mg, Intravenous, Once, Mane Jon DO    [Transfer Hold] dextrose (D50W) (25 g/50 mL) IV injection 25 g, 25 g, Intravenous, Q15 Min PRN, Carmen Andersen APRN    [Transfer Hold] dextrose (GLUTOSE) oral gel 15 g, 15 g, Oral, Q15 Min PRN, Carmen Andersen APRN    [Transfer Hold] DULoxetine (CYMBALTA) DR capsule 60 mg, 60 mg, Oral, BID, Carmen Andersen APRN, 60 mg at 03/20/25 2103    [Transfer Hold] finasteride (PROSCAR) tablet 5 mg, 5 mg, Oral, Daily, Carmen Andersen APRN, 5 mg at 03/20/25 0817    [Transfer Hold] glucagon (GLUCAGEN) injection 1 mg, 1 mg, Intramuscular, Q15 Min PRN, Carmen Andersen, APRN    [Transfer Hold] HYDROmorphone (DILAUDID) injection 1 mg, 1 mg, Intravenous, Q3H PRN, 1 mg at 03/20/25 2103 **AND** [Transfer Hold] naloxone (NARCAN) injection 0.4 mg, 0.4 mg, Intravenous, Q5 Min PRN, Elyssa Marino MD    [Transfer Hold] insulin glargine (LANTUS, SEMGLEE) injection 40 Units, 40 Units, Subcutaneous, Daily, Carmen Andersen, APRN, 40 Units at 03/20/25 0817    [Transfer Hold] insulin glargine (LANTUS, SEMGLEE) injection 40 Units, 40 Units,  Subcutaneous, Q PM, Carmen Andersen APRN, 40 Units at 03/20/25 1706    [Transfer Hold] insulin lispro (HUMALOG/ADMELOG) injection 2-7 Units, 2-7 Units, Subcutaneous, 4x Daily AC & at Bedtime, Carmen Andersen APRN, 3 Units at 03/20/25 2103    [Transfer Hold] nitroglycerin (NITROSTAT) SL tablet 0.4 mg, 0.4 mg, Sublingual, Q5 Min PRN, Carmen Andersen APRN    [Transfer Hold] ondansetron (ZOFRAN) injection 4 mg, 4 mg, Intravenous, Q6H PRN, Carmen Andersen APRN, 4 mg at 03/19/25 1411    [Transfer Hold] oxyCODONE (ROXICODONE) immediate release tablet 10 mg, 10 mg, Oral, Q4H PRN, Elyssa Marino MD, 10 mg at 03/21/25 0504    [Transfer Hold] pantoprazole (PROTONIX) injection 40 mg, 40 mg, Intravenous, Q AM, Carmen Andersen APRN, 40 mg at 03/21/25 0514    [COMPLETED] Insert Peripheral IV, , , Once **AND** [Transfer Hold] sodium chloride 0.9 % flush 10 mL, 10 mL, Intravenous, PRN, Sadi Lewis, MITCHEL    [Transfer Hold] sodium chloride 0.9 % flush 10 mL, 10 mL, Intravenous, Q12H, Carmen Andersen APRN, 10 mL at 03/20/25 2103    [Transfer Hold] sodium chloride 0.9 % flush 10 mL, 10 mL, Intravenous, PRN, Carmen Andersen, APRN    [Transfer Hold] sodium chloride 0.9 % infusion 40 mL, 40 mL, Intravenous, PRN, Carmen Andersen APRN    [Transfer Hold] tamsulosin (FLOMAX) 24 hr capsule 0.8 mg, 0.8 mg, Oral, Daily, Carmen Andersen APRN, 0.8 mg at 03/20/25 0817    The following portions of the patient's history were reviewed and updated as appropriate: allergies, current medications, past family history, past medical history, past social history, past surgical history, and problem list.      REVIEW OF PERTINENT MEDICAL DATA    Past Medical History:   Diagnosis Date    Chronic back pain     Depression     Diabetes mellitus     Hypertension     Stroke      Past Surgical History:   Procedure Laterality Date    BONE SPUR LEG      CARPAL TUNNEL RELEASE Bilateral     RETINAL DETACHMENT REPAIR Bilateral      SINUS SURGERY       History reviewed. No pertinent family history.  Social History     Socioeconomic History    Marital status:    Tobacco Use    Smoking status: Former     Current packs/day: 0.00     Average packs/day: 2.0 packs/day for 18.0 years (36.0 ttl pk-yrs)     Types: Cigarettes     Start date:      Quit date:      Years since quittin.2     Passive exposure: Never    Smokeless tobacco: Never   Vaping Use    Vaping status: Never Used   Substance and Sexual Activity    Alcohol use: Yes    Drug use: Not Currently    Sexual activity: Defer         Review of Systems   Musculoskeletal:  Positive for arthralgias and back pain.         Vitals:    25 0417 25 0600 25 0753 25 0906   BP: 141/92   148/71   BP Location: Left arm      Patient Position: Lying      Pulse: 100   82   Resp: 11  13 12   Temp: 98.7 °F (37.1 °C)  97.5 °F (36.4 °C) 97.6 °F (36.4 °C)   TempSrc: Oral  Oral    SpO2:    91%   Weight:  (!) 141 kg (310 lb 10.1 oz)     Height:             Objective   Physical Exam  Musculoskeletal:         General: Tenderness present.        Back:            Imaging Reviewed:  MRI lumbar spine with and without contrast-3/17/2025  - Acute mild compression fracture of inferior endplate of L3 and superior endplate of L5 without significant osseous retropulsion.  Mild associated bone marrow edema and enhancement.  - Increased prominence of ventral epidural space from L3-L4 through the sacrum with both steer hyper and hypointense signal-suspect mostly combination of epidural fat and prominence of epidural venous plexus though a small epidural hematoma in the setting of trauma is not excluded  L1-2-moderate facet arthropathy  L2-3-moderate bilateral facet arthropathy, ligamentum flavum thickening, small bilateral facet effusions  L3-4-severe bilateral facet arthropathy, ligamentum flavum thickening, moderate spinal canal stenosis.  Moderate to severe bilateral foraminal  narrowing.  L4-5-severe bilateral facet arthropathy, ligamentum flavum thickening  L5-S1-mild spinal canal stenosis due to epidural process as above.  Moderate bilateral facet arthropathy.    Assessment:    1. Compression fracture of L3 lumbar vertebra, closed, initial encounter    2. Compression fracture of L5 lumbar vertebra, closed, initial encounter         Plan:   1.  MRI lumbar spine was reviewed independently by me.  I also went over MRI with patient and his family.  MRI shows acute compression fractures of L3 and L5.  Physical exam consistent with severe pain on palpation over L3 and L5 vertebral level.  Patient has been in excruciating pain and axial lower back and unable to do any activities due to severe pain and unable to tolerate TLSO.  He is requiring escalating doses of pain medications.  Discussed with patient that he may consider TLSO for 6 to 8 weeks and see if fracture heals, but he is unable to tolerate TLSO and pain is so severe he is unable to do any activities.  Discussed with patient that he is good candidate for L3 and L5 kyphoplasty.  Benefits and risks were discussed with patient including but not limited to bleeding, infection, nerve damage, paralysis, pulmonary embolism, cement leak, death.  Patient understands and agrees with the plan and agrees to proceed with the procedure.  Discussed with patient that due to his morbid obesity, if I am unable to get appropriate imaging during procedure, I may not be able to proceed with the procedure.  He understands and agrees with the plan.     Will proceed with kyphoplasty at L3 and L5 level.       Mane Jon DO  Pain Management   [facility]

## 2025-03-20 NOTE — PLAN OF CARE
Problem: Skin Injury Risk Increased  Goal: Skin Health and Integrity  Outcome: Not Progressing  Intervention: Optimize Skin Protection  Recent Flowsheet Documentation  Taken 3/20/2025 0817 by Kelvin Su RN  Pressure Reduction Techniques: frequent weight shift encouraged  Pressure Reduction Devices: pressure-redistributing mattress utilized  Skin Protection: incontinence pads utilized     Problem: Fall Injury Risk  Goal: Absence of Fall and Fall-Related Injury  Outcome: Not Progressing  Intervention: Identify and Manage Contributors  Recent Flowsheet Documentation  Taken 3/20/2025 0817 by Kelvin Su RN  Medication Review/Management: medications reviewed  Intervention: Promote Injury-Free Environment  Recent Flowsheet Documentation  Taken 3/20/2025 1400 by Kelvin Su RN  Safety Promotion/Fall Prevention: safety round/check completed  Taken 3/20/2025 1200 by Kelvin Su RN  Safety Promotion/Fall Prevention: safety round/check completed  Taken 3/20/2025 1000 by Kelvin Su RN  Safety Promotion/Fall Prevention: safety round/check completed  Taken 3/20/2025 0817 by Kelvin Su RN  Safety Promotion/Fall Prevention: safety round/check completed   Goal Outcome Evaluation:

## 2025-03-20 NOTE — ANESTHESIA PREPROCEDURE EVALUATION
Anesthesia Evaluation     Patient summary reviewed and Nursing notes reviewed   NPO Solid Status: > 8 hours  NPO Liquid Status: > 8 hours           Airway   Mallampati: III  TM distance: >3 FB  Neck ROM: full  Large neck circumference and Possible difficult intubation  Dental      Pulmonary - normal exam   (+) asthma,  Cardiovascular     ECG reviewed    (+) hypertension, hyperlipidemia      Neuro/Psych  (+) numbness  (-) CVA (2024 no residual sx)  GI/Hepatic/Renal/Endo    (+) morbid obesity, GERD, diabetes mellitus type 2 poorly controlled using insulin    Musculoskeletal     Abdominal    Substance History      OB/GYN          Other   arthritis,     ROS/Med Hx Other: Lumbar compression fracture:  -s/p fall off utility trailer on 3/13. OSH images negative on 3/13. Discharge home with flexeril.   -over past 2 days with new urinary incontinence, difficulty ambulating, worsening pain to lower back and BLE.   -MRI: acute/subacute mild compression fractures at inferior endplate L3 and superior end place L5 without significant osseous retropulsion. Increased prominence of ventral epidural space from L 3-4 through the sacrum, cannot rule out small superimposed epidural hematoma.                Anesthesia Plan    ASA 3     general     intravenous induction     Anesthetic plan, risks, benefits, and alternatives have been provided, discussed and informed consent has been obtained with: patient.    Plan discussed with CRNA.    CODE STATUS:    Code Status (Patient has no pulse and is not breathing): CPR (Attempt to Resuscitate)  Medical Interventions (Patient has pulse or is breathing): Full Support  Level Of Support Discussed With: Patient

## 2025-03-20 NOTE — PLAN OF CARE
Goal Outcome Evaluation:   Patient awoke for pain meds 3 times and no other problems after. Will continue with current plan.

## 2025-03-21 ENCOUNTER — APPOINTMENT (OUTPATIENT)
Dept: GENERAL RADIOLOGY | Facility: HOSPITAL | Age: 65
DRG: 516 | End: 2025-03-21
Payer: MEDICARE

## 2025-03-21 ENCOUNTER — APPOINTMENT (OUTPATIENT)
Dept: GENERAL RADIOLOGY | Facility: HOSPITAL | Age: 65
DRG: 516 | End: 2025-03-21
Payer: COMMERCIAL

## 2025-03-21 ENCOUNTER — ANESTHESIA (OUTPATIENT)
Dept: PERIOP | Facility: HOSPITAL | Age: 65
End: 2025-03-21
Payer: MEDICARE

## 2025-03-21 LAB
GLUCOSE BLDC GLUCOMTR-MCNC: 196 MG/DL (ref 70–105)
GLUCOSE BLDC GLUCOMTR-MCNC: 207 MG/DL (ref 70–105)
GLUCOSE BLDC GLUCOMTR-MCNC: 221 MG/DL (ref 70–105)
GLUCOSE BLDC GLUCOMTR-MCNC: 221 MG/DL (ref 70–105)
GLUCOSE BLDC GLUCOMTR-MCNC: 247 MG/DL (ref 70–105)
GLUCOSE BLDC GLUCOMTR-MCNC: 298 MG/DL (ref 70–105)

## 2025-03-21 PROCEDURE — 25010000002 CEFAZOLIN 3 G RECONSTITUTED SOLUTION 1 EACH VIAL: Performed by: STUDENT IN AN ORGANIZED HEALTH CARE EDUCATION/TRAINING PROGRAM

## 2025-03-21 PROCEDURE — 63710000001 INSULIN LISPRO (HUMAN) PER 5 UNITS: Performed by: STUDENT IN AN ORGANIZED HEALTH CARE EDUCATION/TRAINING PROGRAM

## 2025-03-21 PROCEDURE — 63710000001 INSULIN GLARGINE PER 5 UNITS: Performed by: STUDENT IN AN ORGANIZED HEALTH CARE EDUCATION/TRAINING PROGRAM

## 2025-03-21 PROCEDURE — 82948 REAGENT STRIP/BLOOD GLUCOSE: CPT

## 2025-03-21 PROCEDURE — 63710000001 INSULIN LISPRO (HUMAN) PER 5 UNITS: Performed by: ANESTHESIOLOGY

## 2025-03-21 PROCEDURE — 25010000002 LIDOCAINE 1% - EPINEPHRINE 1:100000 1 %-1:100000 SOLUTION 20 ML VIAL: Performed by: STUDENT IN AN ORGANIZED HEALTH CARE EDUCATION/TRAINING PROGRAM

## 2025-03-21 PROCEDURE — 0QS03ZZ REPOSITION LUMBAR VERTEBRA, PERCUTANEOUS APPROACH: ICD-10-PCS | Performed by: STUDENT IN AN ORGANIZED HEALTH CARE EDUCATION/TRAINING PROGRAM

## 2025-03-21 PROCEDURE — 25010000002 HYDROMORPHONE 1 MG/ML SOLUTION: Performed by: STUDENT IN AN ORGANIZED HEALTH CARE EDUCATION/TRAINING PROGRAM

## 2025-03-21 PROCEDURE — C1713 ANCHOR/SCREW BN/BN,TIS/BN: HCPCS | Performed by: STUDENT IN AN ORGANIZED HEALTH CARE EDUCATION/TRAINING PROGRAM

## 2025-03-21 PROCEDURE — 25010000002 ONDANSETRON PER 1 MG: Performed by: NURSE ANESTHETIST, CERTIFIED REGISTERED

## 2025-03-21 PROCEDURE — 0QU03JZ SUPPLEMENT LUMBAR VERTEBRA WITH SYNTHETIC SUBSTITUTE, PERCUTANEOUS APPROACH: ICD-10-PCS | Performed by: STUDENT IN AN ORGANIZED HEALTH CARE EDUCATION/TRAINING PROGRAM

## 2025-03-21 PROCEDURE — 22515 PERQ VERTEBRAL AUGMENTATION: CPT | Performed by: STUDENT IN AN ORGANIZED HEALTH CARE EDUCATION/TRAINING PROGRAM

## 2025-03-21 PROCEDURE — 25810000003 LACTATED RINGERS PER 1000 ML: Performed by: STUDENT IN AN ORGANIZED HEALTH CARE EDUCATION/TRAINING PROGRAM

## 2025-03-21 PROCEDURE — 76000 FLUOROSCOPY <1 HR PHYS/QHP: CPT

## 2025-03-21 PROCEDURE — 25010000002 BUPIVACAINE (PF) 0.25 % SOLUTION 30 ML VIAL: Performed by: STUDENT IN AN ORGANIZED HEALTH CARE EDUCATION/TRAINING PROGRAM

## 2025-03-21 PROCEDURE — 25010000002 SUGAMMADEX 200 MG/2ML SOLUTION: Performed by: NURSE ANESTHETIST, CERTIFIED REGISTERED

## 2025-03-21 PROCEDURE — 25010000002 HYDROMORPHONE 1 MG/ML SOLUTION: Performed by: NURSE ANESTHETIST, CERTIFIED REGISTERED

## 2025-03-21 PROCEDURE — 22514 PERQ VERTEBRAL AUGMENTATION: CPT | Performed by: STUDENT IN AN ORGANIZED HEALTH CARE EDUCATION/TRAINING PROGRAM

## 2025-03-21 PROCEDURE — 25010000002 CEFAZOLIN PER 500 MG: Performed by: NURSE ANESTHETIST, CERTIFIED REGISTERED

## 2025-03-21 PROCEDURE — 25010000002 LIDOCAINE PF 2% 2 % SOLUTION: Performed by: NURSE ANESTHETIST, CERTIFIED REGISTERED

## 2025-03-21 PROCEDURE — 82948 REAGENT STRIP/BLOOD GLUCOSE: CPT | Performed by: STUDENT IN AN ORGANIZED HEALTH CARE EDUCATION/TRAINING PROGRAM

## 2025-03-21 PROCEDURE — 25810000003 LACTATED RINGERS PER 1000 ML: Performed by: NURSE ANESTHETIST, CERTIFIED REGISTERED

## 2025-03-21 PROCEDURE — 25010000002 MIDAZOLAM PER 1 MG: Performed by: NURSE ANESTHETIST, CERTIFIED REGISTERED

## 2025-03-21 PROCEDURE — 25010000002 FENTANYL CITRATE (PF) 50 MCG/ML SOLUTION: Performed by: NURSE ANESTHETIST, CERTIFIED REGISTERED

## 2025-03-21 PROCEDURE — 72100 X-RAY EXAM L-S SPINE 2/3 VWS: CPT

## 2025-03-21 PROCEDURE — 25010000002 PROPOFOL 10 MG/ML EMULSION: Performed by: NURSE ANESTHETIST, CERTIFIED REGISTERED

## 2025-03-21 DEVICE — SYSTEM WITHOUT NEEDLES WITH HV BONE CEMENT
Type: IMPLANTABLE DEVICE | Site: SPINE LUMBAR | Status: FUNCTIONAL
Brand: AUTOPLEX, VERTAPLEX

## 2025-03-21 RX ORDER — FENTANYL CITRATE 50 UG/ML
INJECTION, SOLUTION INTRAMUSCULAR; INTRAVENOUS AS NEEDED
Status: DISCONTINUED | OUTPATIENT
Start: 2025-03-21 | End: 2025-03-21 | Stop reason: SURG

## 2025-03-21 RX ORDER — SODIUM CHLORIDE, SODIUM LACTATE, POTASSIUM CHLORIDE, CALCIUM CHLORIDE 600; 310; 30; 20 MG/100ML; MG/100ML; MG/100ML; MG/100ML
1000 INJECTION, SOLUTION INTRAVENOUS ONCE
Status: COMPLETED | OUTPATIENT
Start: 2025-03-21 | End: 2025-03-21

## 2025-03-21 RX ORDER — OXYCODONE HYDROCHLORIDE 5 MG/1
10 TABLET ORAL EVERY 4 HOURS PRN
Status: DISCONTINUED | OUTPATIENT
Start: 2025-03-21 | End: 2025-03-21 | Stop reason: HOSPADM

## 2025-03-21 RX ORDER — LIDOCAINE HYDROCHLORIDE 20 MG/ML
INJECTION, SOLUTION EPIDURAL; INFILTRATION; INTRACAUDAL; PERINEURAL AS NEEDED
Status: DISCONTINUED | OUTPATIENT
Start: 2025-03-21 | End: 2025-03-21 | Stop reason: SURG

## 2025-03-21 RX ORDER — ROCURONIUM BROMIDE 10 MG/ML
INJECTION, SOLUTION INTRAVENOUS AS NEEDED
Status: DISCONTINUED | OUTPATIENT
Start: 2025-03-21 | End: 2025-03-21 | Stop reason: SURG

## 2025-03-21 RX ORDER — FENTANYL CITRATE 50 UG/ML
50 INJECTION, SOLUTION INTRAMUSCULAR; INTRAVENOUS
Status: DISCONTINUED | OUTPATIENT
Start: 2025-03-21 | End: 2025-03-21 | Stop reason: HOSPADM

## 2025-03-21 RX ORDER — ONDANSETRON 2 MG/ML
INJECTION INTRAMUSCULAR; INTRAVENOUS AS NEEDED
Status: DISCONTINUED | OUTPATIENT
Start: 2025-03-21 | End: 2025-03-21 | Stop reason: SURG

## 2025-03-21 RX ORDER — PHENYLEPHRINE HCL IN 0.9% NACL 1 MG/10 ML
SYRINGE (ML) INTRAVENOUS AS NEEDED
Status: DISCONTINUED | OUTPATIENT
Start: 2025-03-21 | End: 2025-03-21 | Stop reason: SURG

## 2025-03-21 RX ORDER — SODIUM CHLORIDE, SODIUM LACTATE, POTASSIUM CHLORIDE, CALCIUM CHLORIDE 600; 310; 30; 20 MG/100ML; MG/100ML; MG/100ML; MG/100ML
INJECTION, SOLUTION INTRAVENOUS CONTINUOUS PRN
Status: DISCONTINUED | OUTPATIENT
Start: 2025-03-21 | End: 2025-03-21 | Stop reason: SURG

## 2025-03-21 RX ORDER — INSULIN LISPRO 100 [IU]/ML
7 INJECTION, SOLUTION INTRAVENOUS; SUBCUTANEOUS ONCE
Status: COMPLETED | OUTPATIENT
Start: 2025-03-21 | End: 2025-03-21

## 2025-03-21 RX ORDER — PROPOFOL 10 MG/ML
VIAL (ML) INTRAVENOUS AS NEEDED
Status: DISCONTINUED | OUTPATIENT
Start: 2025-03-21 | End: 2025-03-21 | Stop reason: SURG

## 2025-03-21 RX ORDER — OXYCODONE HYDROCHLORIDE 5 MG/1
5 TABLET ORAL ONCE AS NEEDED
Status: DISCONTINUED | OUTPATIENT
Start: 2025-03-21 | End: 2025-03-21 | Stop reason: HOSPADM

## 2025-03-21 RX ORDER — MIDAZOLAM HYDROCHLORIDE 1 MG/ML
INJECTION, SOLUTION INTRAMUSCULAR; INTRAVENOUS AS NEEDED
Status: DISCONTINUED | OUTPATIENT
Start: 2025-03-21 | End: 2025-03-21 | Stop reason: SURG

## 2025-03-21 RX ORDER — EPHEDRINE SULFATE 5 MG/ML
INJECTION INTRAVENOUS AS NEEDED
Status: DISCONTINUED | OUTPATIENT
Start: 2025-03-21 | End: 2025-03-21 | Stop reason: SURG

## 2025-03-21 RX ADMIN — FENTANYL CITRATE 50 MCG: 50 INJECTION, SOLUTION INTRAMUSCULAR; INTRAVENOUS at 10:30

## 2025-03-21 RX ADMIN — LIDOCAINE HYDROCHLORIDE 100 MG: 20 INJECTION, SOLUTION EPIDURAL; INFILTRATION; INTRACAUDAL; PERINEURAL at 10:30

## 2025-03-21 RX ADMIN — EPHEDRINE SULFATE 10 MG: 5 INJECTION INTRAVENOUS at 11:10

## 2025-03-21 RX ADMIN — PROPOFOL 50 MG: 10 INJECTION, EMULSION INTRAVENOUS at 11:26

## 2025-03-21 RX ADMIN — ROCURONIUM BROMIDE 50 MG: 10 INJECTION INTRAVENOUS at 10:30

## 2025-03-21 RX ADMIN — DULOXETINE 60 MG: 30 CAPSULE, DELAYED RELEASE ORAL at 20:41

## 2025-03-21 RX ADMIN — Medication 200 MCG: at 10:47

## 2025-03-21 RX ADMIN — SUGAMMADEX 200 MG: 100 INJECTION, SOLUTION INTRAVENOUS at 12:16

## 2025-03-21 RX ADMIN — INSULIN LISPRO 7 UNITS: 100 INJECTION, SOLUTION INTRAVENOUS; SUBCUTANEOUS at 12:56

## 2025-03-21 RX ADMIN — INSULIN LISPRO 3 UNITS: 100 INJECTION, SOLUTION INTRAVENOUS; SUBCUTANEOUS at 17:43

## 2025-03-21 RX ADMIN — Medication 200 MCG: at 10:32

## 2025-03-21 RX ADMIN — EPHEDRINE SULFATE 10 MG: 5 INJECTION INTRAVENOUS at 11:32

## 2025-03-21 RX ADMIN — SODIUM CHLORIDE, SODIUM LACTATE, POTASSIUM CHLORIDE, AND CALCIUM CHLORIDE: .6; .31; .03; .02 INJECTION, SOLUTION INTRAVENOUS at 10:27

## 2025-03-21 RX ADMIN — OXYCODONE HYDROCHLORIDE 10 MG: 5 TABLET ORAL at 15:09

## 2025-03-21 RX ADMIN — CARVEDILOL 12.5 MG: 6.25 TABLET, FILM COATED ORAL at 17:42

## 2025-03-21 RX ADMIN — EPHEDRINE SULFATE 10 MG: 5 INJECTION INTRAVENOUS at 10:56

## 2025-03-21 RX ADMIN — ROCURONIUM BROMIDE 20 MG: 10 INJECTION INTRAVENOUS at 11:26

## 2025-03-21 RX ADMIN — INSULIN GLARGINE 40 UNITS: 100 INJECTION, SOLUTION SUBCUTANEOUS at 17:43

## 2025-03-21 RX ADMIN — HYDROMORPHONE HYDROCHLORIDE 1 MG: 1 INJECTION, SOLUTION INTRAMUSCULAR; INTRAVENOUS; SUBCUTANEOUS at 17:42

## 2025-03-21 RX ADMIN — CEFAZOLIN 3 G: 2 INJECTION, POWDER, FOR SOLUTION INTRAMUSCULAR; INTRAVENOUS at 10:27

## 2025-03-21 RX ADMIN — SUGAMMADEX 100 MG: 100 INJECTION, SOLUTION INTRAVENOUS at 12:18

## 2025-03-21 RX ADMIN — EPHEDRINE SULFATE 10 MG: 5 INJECTION INTRAVENOUS at 11:03

## 2025-03-21 RX ADMIN — Medication 100 MCG: at 10:50

## 2025-03-21 RX ADMIN — ONDANSETRON 4 MG: 2 INJECTION, SOLUTION INTRAMUSCULAR; INTRAVENOUS at 10:30

## 2025-03-21 RX ADMIN — PANTOPRAZOLE SODIUM 40 MG: 40 INJECTION, POWDER, LYOPHILIZED, FOR SOLUTION INTRAVENOUS at 05:14

## 2025-03-21 RX ADMIN — ROCURONIUM BROMIDE 10 MG: 10 INJECTION INTRAVENOUS at 11:41

## 2025-03-21 RX ADMIN — Medication 200 MCG: at 10:56

## 2025-03-21 RX ADMIN — EPHEDRINE SULFATE 10 MG: 5 INJECTION INTRAVENOUS at 10:50

## 2025-03-21 RX ADMIN — Medication 100 MCG: at 11:03

## 2025-03-21 RX ADMIN — SODIUM CHLORIDE, SODIUM LACTATE, POTASSIUM CHLORIDE, AND CALCIUM CHLORIDE 1000 ML: .6; .31; .03; .02 INJECTION, SOLUTION INTRAVENOUS at 10:21

## 2025-03-21 RX ADMIN — SODIUM CHLORIDE 3000 MG: 900 INJECTION INTRAVENOUS at 10:19

## 2025-03-21 RX ADMIN — OXYCODONE HYDROCHLORIDE 10 MG: 5 TABLET ORAL at 05:04

## 2025-03-21 RX ADMIN — FENTANYL CITRATE 50 MCG: 50 INJECTION, SOLUTION INTRAMUSCULAR; INTRAVENOUS at 11:30

## 2025-03-21 RX ADMIN — PROPOFOL 170 MG: 10 INJECTION, EMULSION INTRAVENOUS at 10:30

## 2025-03-21 RX ADMIN — HYDROMORPHONE HYDROCHLORIDE 0.5 MG: 1 INJECTION, SOLUTION INTRAMUSCULAR; INTRAVENOUS; SUBCUTANEOUS at 12:42

## 2025-03-21 RX ADMIN — Medication 10 ML: at 20:42

## 2025-03-21 RX ADMIN — INSULIN LISPRO 4 UNITS: 100 INJECTION, SOLUTION INTRAVENOUS; SUBCUTANEOUS at 20:41

## 2025-03-21 RX ADMIN — MIDAZOLAM 2 MG: 1 INJECTION INTRAMUSCULAR; INTRAVENOUS at 10:27

## 2025-03-21 NOTE — PLAN OF CARE
Goal Outcome Evaluation:   Patient was NPO after midnight for their procedure in the morning. Will continue to monitor.

## 2025-03-21 NOTE — SIGNIFICANT NOTE
Pt in OR during AM; Kyphoplasty L3 and L5 vertebral level. Will follow-up next treatment date as able/appropriate.

## 2025-03-21 NOTE — PROGRESS NOTES
[facility] MEDICINE SERVICE  DAILY PROGRESS NOTE    NAME: Matthieu Hawk  : 1960  MRN: [id number]      LOS: 4 days     PROVIDER OF SERVICE: JESSICA Herbert    Chief Complaint: Lumbar compression fracture    Subjective:     Interval History:  History taken from: patient chart    Patient lying in bed.  Pain is improved with pain medication.  All questions and concerns addressed.    Review of Systems:   Please see above, review of systems otherwise negative     Objective:     Vital Signs  Temp:  [97.5 °F (36.4 °C)-98.7 °F (37.1 °C)] 97.6 °F (36.4 °C)  Heart Rate:  [] 82  Resp:  [11-22] 12  BP: (118-148)/(70-92) 148/71  Flow (L/min) (Oxygen Therapy):  [2-7] 7   Body mass index is 44.57 kg/m².    Physical Exam    PHYSICAL EXAM  Constitutional:  Well-developed, well-nourished, no acute distress, nontoxic appearance   Eyes:  PERRL, conjunctivae normal, EOMI   HENT:  Atraumatic, external ears normal, nose normal, oropharynx moist, no pharyngeal exudates. Neck-normal range of motion, no tenderness, supple, trachea midline  Respiratory:  ctab, nonlabored respirations without accessory muscle use  Cardiovascular:  Normal rate, normal rhythm, no murmurs, no gallops, no rubs   GI:  Soft, nondistended, normal bowel sounds, nontender, no organomegaly, no mass, no rebound, no guarding   Musculoskeletal:  No edema, no tenderness, no deformities Difficulty raising RLE off bed.   Denies sensation changes, numbness or tingling to BLE. Tenderness to lumbar spine.  Integument:  Well hydrated, no rash   Neurologic:  Alert & oriented x 3, CN 2-12 normal, normal motor function, normal sensory function, no focal deficits noted   Psychiatric:  Speech and behavior appropriate       Diagnostic Data    Results from last 7 days   Lab Units 25  0257 25  0124 25  1535   WBC 10*3/mm3 8.20   < > 9.29   HEMOGLOBIN g/dL 12.4*   < > 13.5   HEMATOCRIT % 39.5   < > 42.4   PLATELETS 10*3/mm3 331   < > 390    GLUCOSE mg/dL 214*   < > 174*   CREATININE mg/dL 0.74*   < > 0.93   BUN mg/dL 14   < > 13   SODIUM mmol/L 136   < > 140   POTASSIUM mmol/L 4.2   < > 4.0   AST (SGOT) U/L  --   --  18   ALT (SGPT) U/L  --   --  8   ALK PHOS U/L  --   --  72   BILIRUBIN mg/dL  --   --  0.4   ANION GAP mmol/L 8.5   < > 15.7*    < > = values in this interval not displayed.       No radiology results for the last day        I reviewed the patient's new clinical results.  I reviewed the patient's new imaging results and agree with the interpretation.    Assessment/Plan:     Active and Resolved Problems  Active Hospital Problems    Diagnosis  POA    **Lumbar compression fracture [S32.000A]  Yes    Compression fracture of L3 lumbar vertebra, closed, initial encounter [S32.030A]  Unknown    Compression fracture of L5 lumbar vertebra, closed, initial encounter [S32.050A]  Unknown      Resolved Hospital Problems   No resolved problems to display.       Lumbar compression fracture:  -s/p fall off utility trailer on 3/13. OSH images negative on 3/13. Discharge home with flexeril.   -over past 2 days with new urinary incontinence, difficulty ambulating, worsening pain to lower back and BLE.   -MRI: acute/subacute mild compression fractures at inferior endplate L3 and superior end place L5 without significant osseous retropulsion. Increased prominence of ventral epidural space from L 3-4 through the sacrum, cannot rule out small superimposed epidural hematoma.  -bedrest until evaluated by neurosurgery  -neurovascular checks BLE every 4 hours.   -pain control with oxycodone and dilaudid. Adjust as needed.   -NPO after MN.   -will need PT/OT evaluation/treatment after pain management procedure  -fall precautions.   -Neurosurgery following patient.  Recommends pain management consult     Diabetes mellitus type 2 with HLD:  -accu check AC/HS with SSI and hypoglycemic protocols  -CCD  -A1c was 9.3% on 1/24/2025  -continue statin  -holding farxiga,  metformin  -continue lantus decreasing dose to 40 units BID until diet fully resumes.      Hypertension:  -trend BP  -BP may be labile with pain response.   -continue home antihypertensive regimen once medications verified by pharmacy  -holding HCTZ while on IVFs.      GERD:  -Protonix for GI Ppx.     Patient was seen and evaluated at bedside.  Neurosurgery has also evaluated this patient, recommended pain management consult for kyphoplasty.  Pain uncontrolled yesterday, pain medications increased last night.  Patient reports improvement.  Will also have PT OT evaluate and appreciate their recommendations plan is for patient to undergo kyphoplasty on Friday, 3/21/2025    VTE Prophylaxis:  Mechanical VTE prophylaxis orders are present.             Disposition Planning:     Barriers to Discharge: Consults  Anticipated Date of Discharge: 3/23/2025  Place of Discharge: Home vs rehab      Time: 30 minutes     Code Status and Medical Interventions: CPR (Attempt to Resuscitate); Full Support   Ordered at: 03/17/25 2008     Code Status (Patient has no pulse and is not breathing):    CPR (Attempt to Resuscitate)     Medical Interventions (Patient has pulse or is breathing):    Full Support     Level Of Support Discussed With:    Patient       Signature: Electronically signed by JESSICA Herbert, 03/21/25, 10:00 EDT.  [facility] Hospitalist Team

## 2025-03-21 NOTE — ANESTHESIA POSTPROCEDURE EVALUATION
Patient: Matthieu Hawk    Procedure Summary       Date: 03/21/25 Room / Location: [facility] OR 02 / [facility] MAIN OR    Anesthesia Start: 1027 Anesthesia Stop: 1234    Procedure: Kyphoplasty L3 and L5 vertebral level (Spine Lumbar) Diagnosis:       Compression fracture of L3 lumbar vertebra, closed, initial encounter      Compression fracture of L5 lumbar vertebra, closed, initial encounter      (Compression fracture of L3 lumbar vertebra, closed, initial encounter [S32.030A])      (Compression fracture of L5 lumbar vertebra, closed, initial encounter [S32.050A])    Surgeons: Mane Jon DO Provider: Maria Del Rosario De La Cruz CRNA    Anesthesia Type: general ASA Status: 3            Anesthesia Type: general    Vitals  Vitals Value Taken Time   /85 03/21/25 13:46   Temp 97.9 °F (36.6 °C) 03/21/25 12:29   Pulse 86 03/21/25 13:47   Resp 12 03/21/25 13:29   SpO2 93 % 03/21/25 13:47   Vitals shown include unfiled device data.        Post Anesthesia Care and Evaluation    Patient location during evaluation: PACU  Patient participation: complete - patient participated  Level of consciousness: awake and alert  Pain management: satisfactory to patient    Airway patency: patent  Anesthetic complications: No anesthetic complications  PONV Status: none  Cardiovascular status: acceptable  Respiratory status: acceptable  Hydration status: acceptable

## 2025-03-21 NOTE — OP NOTE
PROCEDURE: KYPHOPLASTY    SURGEON: Mane Jon DO    OPERATION:    1. KYPHON® Balloon Kyphoplasty at L3 and L5 levels [place]     2. Insertion of Autoplex HV-R™ bone cement under low pressure at L3 and L5 levels      ANESTHESIA: General    PREOPERATIVE DIAGNOSIS: 733.13 pathologic fracture of vertebrae, 733.00 osteoporosis with pathologic fracture, vertebral compression fracture.    POSTOPERATIVE DIAGNOSIS: same    PREOPERATIVE ANTIBIOTICS: 3 g IV given 30 minutes prior to incision.    OPERATIVE PROCEDURE: The patient was brought to the operating room suite and general anesthesia with endotracheal intubation was performed. The patient was    positioned prone on the Enrrique table. The back was prepped and draped. The image  intensifier (C-arm) was brought into position and the L3 pedicles were identified and marked with a skin marker. In view of the collapse of L3, a transpedicular approach to the vertebral body was appropriate.    A 22g nay needle was advanced to the pedicle and Lidocaine 1% was infiltrated, total of 5cc.    AP and lateral images were taken to verify position and trajectory. Alongside of the guide needle a 0.25 cm paramedian incision was made. The osteointroducer was advanced through the pedicle on the Left L3. Once I was at the junction of the pedicle and the vertebral body, a lateral image was taken to insure that the curved cannula was positioned approximately 1 cm past the vertebral body wall. Through the cannula, a drill was advanced into the vertebral body under fluoroscopic guidance toward the anterior cortex, creating a channel. The anterior cortex was probed with the guide pin to ensure no perforations in the anterior cortex. After completing the entry into the vertebral body, a 15 mm inflatable bone tamp was inserted through the cannula and advanced under fluoroscopic guidance into the vertebral body near the anterior cortex. The radiopaque marker bands on the bone tamp were  identified using AP and lateral images.      Once both bone tamps were in position, they were inflated to 0.5 cc and 200 psi. Expansion of the bone tamps was done sequentially in increments of 0.25 to 0.5 cc of contrast, with careful attention being paid to the inflation pressures and balloon position. The inflation was monitored with AP and lateral imaging. The final balloon volume was 3 1/2 cc.  There was no breach of the lateral wall or anterior cortex of the vertebral body. Direct reduction of the fracture was achieved, end plate movement was noted and approximately 5 mm of height restoration was achieved.    Under fluoroscopic imaging, and the use of the bone void fillers, internal fixation was achieved through a low-pressure injection of Autoflex HV-R™ bone cement. The cavity was filled with a total volume of 3 cc with spread of cement to bilaterally Once the bone cement had hardened, the cannulas were then removed.    At this time, we proceeded to perform a balloon kyphoplasty at L5 using the same sequence of steps as above. An entry needle was placed right side through the pedicle into the vertebral body, a cortical window was created, inflation of the bone tamps directly reduced the fracture, the bone tamps were removed, and internal fixation by bone void filler insertion was achieved.Throughout the procedure, AP and lateral imaging monitored positioning.    Post-procedure, all incisions were closed with sutures. The patient was kept in the prone    position for approximately 10 minutes post cement injection or until hardening of the cement was confirmed. Patient was then turned supine. They were monitored briefly and returned to the floor. Matthieu Hawk was found to have no change in their baseline neurological exam at this time.    COMPLICATIONS: There were no immediate postprocedure complications.    ESTIMATED BLOOD LOSS: The estimated blood loss was nil.    Specimens as above.    Mane Jon,  DO    March 21, 2025

## 2025-03-21 NOTE — BRIEF OP NOTE
Kyphoplasty Progress Note    Matthieu Hawk  3/21/2025    Pre-op Diagnosis:   Compression fracture of L3 lumbar vertebra, closed, initial encounter [S32.030A]  Compression fracture of L5 lumbar vertebra, closed, initial encounter [S32.050A]       Post-Op Diagnosis Codes:     * Compression fracture of L3 lumbar vertebra, closed, initial encounter [S32.030A]     * Compression fracture of L5 lumbar vertebra, closed, initial encounter [S32.050A]    Procedure(s):      Procedure(s):  Kyphoplasty L3 and L5 vertebral level              Surgeon(s):  Mane Jno DO    Anesthesia: General    Staff:   Circulator: Cm Dinero RN  Radiology Technologist: Evelyn Squires  Scrub Person: Megan Kirkpatrick  Vendor Representative: Kenna Barba       Estimated Blood Loss: 10 mL    Urine Voided: * No values recorded between 3/21/2025 10:22 AM and 3/21/2025 12:28 PM *    Specimens:                None      Drains:   [REMOVED] External Urinary Catheter (Removed)   Daily Indications Daily output 03/18/25 2048   Site Assessment Clean;Skin intact 03/18/25 2048   Application/Removal external catheter removed 03/18/25 1404   Collection Container Wall suction 03/18/25 2048   Securement Method Securing device 03/18/25 2048   Catheter care complete Yes 03/18/25 1404   Output (mL) 200 mL 03/18/25 2030       Findings: None      Complications: None          Mane Jon DO     Date: 3/21/2025  Time: 12:38 EDT

## 2025-03-22 ENCOUNTER — READMISSION MANAGEMENT (OUTPATIENT)
Dept: CALL CENTER | Facility: HOSPITAL | Age: 65
End: 2025-03-22
Payer: MEDICARE

## 2025-03-22 VITALS
OXYGEN SATURATION: 98 % | RESPIRATION RATE: 14 BRPM | HEIGHT: 70 IN | DIASTOLIC BLOOD PRESSURE: 93 MMHG | BODY MASS INDEX: 45.07 KG/M2 | WEIGHT: 314.82 LBS | SYSTOLIC BLOOD PRESSURE: 142 MMHG | TEMPERATURE: 98.6 F | HEART RATE: 90 BPM

## 2025-03-22 LAB — GLUCOSE BLDC GLUCOMTR-MCNC: 245 MG/DL (ref 70–105)

## 2025-03-22 PROCEDURE — 63710000001 INSULIN LISPRO (HUMAN) PER 5 UNITS: Performed by: STUDENT IN AN ORGANIZED HEALTH CARE EDUCATION/TRAINING PROGRAM

## 2025-03-22 PROCEDURE — 82948 REAGENT STRIP/BLOOD GLUCOSE: CPT | Performed by: STUDENT IN AN ORGANIZED HEALTH CARE EDUCATION/TRAINING PROGRAM

## 2025-03-22 PROCEDURE — 63710000001 INSULIN GLARGINE PER 5 UNITS: Performed by: STUDENT IN AN ORGANIZED HEALTH CARE EDUCATION/TRAINING PROGRAM

## 2025-03-22 RX ORDER — BLOOD-GLUCOSE METER
1 EACH MISCELLANEOUS ONCE
Qty: 1 KIT | Refills: 0 | Status: SHIPPED | OUTPATIENT
Start: 2025-03-22 | End: 2025-03-22

## 2025-03-22 RX ORDER — LANCETS
1 EACH MISCELLANEOUS
Qty: 200 EACH | Refills: 2 | Status: SHIPPED | OUTPATIENT
Start: 2025-03-22

## 2025-03-22 RX ORDER — BLOOD SUGAR DIAGNOSTIC
1 STRIP MISCELLANEOUS
Qty: 150 EACH | Refills: 2 | Status: SHIPPED | OUTPATIENT
Start: 2025-03-22

## 2025-03-22 RX ORDER — OXYCODONE HYDROCHLORIDE 10 MG/1
10 TABLET ORAL EVERY 4 HOURS PRN
Qty: 18 TABLET | Refills: 0 | Status: SHIPPED | OUTPATIENT
Start: 2025-03-22 | End: 2025-03-28 | Stop reason: SDUPTHER

## 2025-03-22 RX ADMIN — OXYCODONE HYDROCHLORIDE 10 MG: 5 TABLET ORAL at 05:41

## 2025-03-22 RX ADMIN — CARVEDILOL 12.5 MG: 6.25 TABLET, FILM COATED ORAL at 08:41

## 2025-03-22 RX ADMIN — DULOXETINE 60 MG: 30 CAPSULE, DELAYED RELEASE ORAL at 08:41

## 2025-03-22 RX ADMIN — TAMSULOSIN HYDROCHLORIDE 0.8 MG: 0.4 CAPSULE ORAL at 08:41

## 2025-03-22 RX ADMIN — ATORVASTATIN CALCIUM 20 MG: 20 TABLET, FILM COATED ORAL at 08:41

## 2025-03-22 RX ADMIN — Medication 10 ML: at 08:42

## 2025-03-22 RX ADMIN — INSULIN GLARGINE 40 UNITS: 100 INJECTION, SOLUTION SUBCUTANEOUS at 08:41

## 2025-03-22 RX ADMIN — FINASTERIDE 5 MG: 5 TABLET, FILM COATED ORAL at 08:41

## 2025-03-22 RX ADMIN — INSULIN LISPRO 3 UNITS: 100 INJECTION, SOLUTION INTRAVENOUS; SUBCUTANEOUS at 08:41

## 2025-03-22 RX ADMIN — AMLODIPINE BESYLATE 10 MG: 5 TABLET ORAL at 08:41

## 2025-03-22 RX ADMIN — OXYCODONE HYDROCHLORIDE 10 MG: 5 TABLET ORAL at 00:30

## 2025-03-22 RX ADMIN — PANTOPRAZOLE SODIUM 40 MG: 40 INJECTION, POWDER, LYOPHILIZED, FOR SOLUTION INTRAVENOUS at 05:42

## 2025-03-22 NOTE — DISCHARGE SUMMARY
[facility] Medicine Services  Discharge Summary    Date of Service: 3/22/2025  Patient Name: Matthieu Hawk  : 1960  MRN: [id number]    Date of Admission: 3/17/2025  Discharge Diagnosis: Lumbar compression fracture  Date of Discharge: 3/22/2025  Primary Care Physician: Mustapha Barlow DO      Presenting Problem:   Lumbar compression fracture [S32.000A]  Fall, initial encounter [W19.XXXA]  Pain in both lower extremities [M79.604, M79.605]  Other closed fracture of third lumbar vertebra, initial encounter [S32.038A]  Acute low back pain without sciatica, unspecified back pain laterality [M54.50]    Active and Resolved Hospital Problems:  Active Hospital Problems    Diagnosis POA    **Lumbar compression fracture [S32.000A] Yes    Compression fracture of L3 lumbar vertebra, closed, initial encounter [S32.030A] Unknown    Compression fracture of L5 lumbar vertebra, closed, initial encounter [S32.050A] Unknown      Resolved Hospital Problems   No resolved problems to display.         Hospital Course     HPI:    Chief Complaint: back pain     History of Present Illness: Matthieu Hawk is a 64 y.o. male with a CMH of HTN, GERD, DM type 2, HLD, asthma, CVA, chronic back pain who presented to [facility] on 3/17/2025 with lower back pain with difficulty ambulating. Lives at home with wife, independent with ADLs.   States on 3/13 fell off utility trailer landing on back. Denies LOC. EMS took to [facility] ER. Reports imaging CXR and CT scans negative and was discharge home with flexeril. Over past 2 days with worsening back pain, urinary incontinence, difficulty ambulating. Pain radiates into right hip, RLE and LLE. Difficulty lifting RLE off bed. Denies fever, chills, headache, chest pain. On arrival to ER VS: 97.6-82-/% room air.      Upon evaluation, awake, alert, resting in bed. Wife at bedside. Current VS: 105-/86-95% room air. Rates lower back pain 8/10. On exam with  "difficulty lifting RLE. LLE \"throbbing.\" Denies sensation changes to BLE, denies numbness/tingling.   CT head no acute intracranial findings.   MRI reveals acute/subacute mild compression fractures at inferior endplate L3 and superior end place L5 without significant osseous retropulsion. Increased prominence of ventral epidural space from L 3-4 through the sacrum, cannot rule out small superimposed epidural hematoma.   Blood work reveals unremarkable CBC, glucose 174, Agap 15,     Hospital Course:    Patient presented secondary to above.  Neurosurgical consultation was obtained.  They recommended pain management to evaluate for possible kyphoplasty.  MRI did reveal acute/subacute compression fractures of L3 as well as L5.    Laboratory studies were relatively unremarkable.  Elevated blood sugar was noted consistent with patient's underlying history of type 2 diabetes.  Diabetic education was provided.    Patient underwent pain management evaluation and on 3/21/2025 patient underwent balloon kyphoplasty at L3/L5.  Please see procedure note for details.  Postoperatively patient was observed overnight.  No acute events were noted.  Patient was able to ambulate in halls without difficulty.  At time of discharge patient will be given a prescription for oxycodone.  He was asked to stop taking his hydrocodone at home.  He is to follow-up with his primary care provider if further pain medications are needed.    No other changes were made at home medication regimen for now.  Patient discharged home in otherwise improved and/or stable condition.  PCP follow-up recommended 1 to 2 weeks.      DISCHARGE Follow Up Recommendations for labs and diagnostics:     PCP follow-up 1 to 2 weeks.    Day of Discharge     Vital Signs:  Temp:  [97.9 °F (36.6 °C)-98.7 °F (37.1 °C)] 98.6 °F (37 °C)  Heart Rate:  [82-93] 90  Resp:  [11-17] 14  BP: (115-154)/(66-93) 142/93  Flow (L/min) (Oxygen Therapy):  [3-10] 3  FiO2 (%):  [92 %-96 %] 96 " %    Physical Exam:  Physical Exam       Pertinent  and/or Most Recent Results     LAB RESULTS:      Lab 03/20/25  0257 03/19/25  0114 03/18/25  0124 03/17/25  1535   WBC 8.20 8.57 12.00* 9.29   HEMOGLOBIN 12.4* 12.0* 12.8* 13.5   HEMATOCRIT 39.5 38.3 40.5 42.4   PLATELETS 331 322 352 390   NEUTROS ABS 4.71 5.65 9.45* 6.02   IMMATURE GRANS (ABS) 0.03 0.03 0.03 0.05   LYMPHS ABS 1.99 1.65 1.38 2.23   MONOS ABS 0.98* 1.05* 0.93* 0.68   EOS ABS 0.45* 0.14 0.16 0.24   MCV 88.0 88.5 87.9 86.4         Lab 03/20/25  0257 03/19/25  0114 03/18/25  0124 03/17/25  1535   SODIUM 136 136 139 140   POTASSIUM 4.2 4.0 4.1 4.0   CHLORIDE 98 99 101 100   CO2 29.5* 27.1 27.4 24.3   ANION GAP 8.5 9.9 10.6 15.7*   BUN 14 14 14 13   CREATININE 0.74* 0.85 0.99 0.93   EGFR 100.6 96.4 84.5 91.1   GLUCOSE 214* 154* 196* 174*   CALCIUM 9.3 9.3 9.2 10.1   MAGNESIUM 2.0 2.0 2.0  --          Lab 03/17/25  1535   TOTAL PROTEIN 7.7   ALBUMIN 4.3   GLOBULIN 3.4   ALT (SGPT) 8   AST (SGOT) 18   BILIRUBIN 0.4   ALK PHOS 72                 Lab 03/18/25  0124   ABO TYPING O   RH TYPING Positive   ANTIBODY SCREEN Negative         Brief Urine Lab Results  (Last result in the past 365 days)        Color   Clarity   Blood   Leuk Est   Nitrite   Protein   CREAT   Urine HCG        09/06/24 1128             51.9               Microbiology Results (last 10 days)       Procedure Component Value - Date/Time    CANDIDA AURIS PCR - Swab, Axilla Right, Axilla Left and Groin [[id number]]  (Normal) Collected: 03/18/25 0120    Lab Status: Final result Specimen: Swab from Axilla Right, Axilla Left and Groin Updated: 03/19/25 1056     CANDIDA AURIS PCR Not Detected            CT Head Without Contrast  Result Date: 3/17/2025  Impression: Impression: No acute intracranial pathology. Electronically Signed: Zenon Rodriguez MD  3/17/2025 6:06 PM EDT  Workstation ID: [id number]    MRI Lumbar Spine With & Without Contrast  Result Date: 3/17/2025  Impression: Impression: Interval  acute/subacute mild compression fractures at the inferior endplate of L3 and the superior endplate of L5 without significant osseous retropulsion. Increased prominence of the ventral epidural space from L3-4 through the sacrum, suspect mostly combination of epidural fat and prominence of epidural venous plexus though a small superimposed epidural hematoma in setting of trauma is not excluded. No significant interval change in multilevel degenerative changes, most pronounced at L3-4, where there is moderate spinal canal stenosis and moderate to severe bilateral foraminal narrowing. Electronically Signed: Parveen Ramirez  3/17/2025 5:52 PM EDT  Workstation ID: [id number]      Results for orders placed during the hospital encounter of 06/14/24    Bilateral Carotid Duplex    Interpretation Summary    Right internal carotid artery demonstrates normal flow without evidence of hemodynamically significant stenosis.    Left internal carotid artery demonstrates normal flow without evidence of hemodynamically significant stenosis.      Results for orders placed during the hospital encounter of 06/14/24    Bilateral Carotid Duplex    Interpretation Summary    Right internal carotid artery demonstrates normal flow without evidence of hemodynamically significant stenosis.    Left internal carotid artery demonstrates normal flow without evidence of hemodynamically significant stenosis.      Results for orders placed during the hospital encounter of 06/14/24    Adult Transthoracic Echo Complete W/ Cont if Necessary Per Protocol (With Agitated Saline)    Interpretation Summary    Left ventricular systolic function is normal. Left ventricular ejection fraction appears to be 66 - 70%.    Left ventricular diastolic function is consistent with (grade I) impaired relaxation.    The left atrial cavity is mildly dilated.    The study is technically difficult for diagnosis. The quality of the study is limited due to patient body habituswith  "poor acoustic windows      Labs Pending at Discharge:  Pending Results       None            Procedures Performed  Procedure(s):  Kyphoplasty L3 and L5 vertebral level         Consults:   Consults       Date and Time Order Name Status Description    3/17/2025  8:08 PM Inpatient Neurosurgery Consult Completed               Discharge Details        Discharge Medications        New Medications        Instructions Start Date   Accu-Chek Guide Me w/Device kit   1 kit, Not Applicable, Once, Dx: E11.65.      Accu-Chek Guide Test test strip  Generic drug: glucose blood   1 each, Other, 4 Times Daily Before Meals & Nightly, Dx: E11.65. Use as instructed      Accu-Chek Softclix Lancets lancets   1 each, Other, 4 Times Daily Before Meals & Nightly, Dx: E11.65. Use as instructed      oxyCODONE 10 MG tablet  Commonly known as: ROXICODONE   10 mg, Oral, Every 4 Hours PRN             Continue These Medications        Instructions Start Date   amLODIPine 10 MG tablet  Commonly known as: NORVASC   10 mg, Oral, Every 24 Hours Scheduled      aspirin 81 MG chewable tablet   81 mg, Oral, Daily      atorvastatin 20 MG tablet  Commonly known as: LIPITOR   20 mg, Oral, Daily      B-D UF III MINI PEN NEEDLES 31G X 5 MM misc  Generic drug: Insulin Pen Needle   1 Needle, Subcutaneous, 2 Times Daily, use as directed      Baqsimi One Pack 3 MG/DOSE powder  Generic drug: Glucagon   3 mg, Nasal, As Needed      BD Insulin Syringe U/F 31G X 5/16\" 1 ML misc  Generic drug: Insulin Syringe-Needle U-100   1 syringe, Subcutaneous, Daily      carvedilol 12.5 MG tablet  Commonly known as: COREG   12.5 mg, Oral, 2 Times Daily With Meals      dapagliflozin Propanediol 10 MG tablet   10 mg, Daily      Dulaglutide 4.5 MG/0.5ML solution auto-injector   4.5 mg, Subcutaneous, Weekly      DULoxetine 60 MG capsule  Commonly known as: CYMBALTA   60 mg, Oral, 2 Times Daily      finasteride 5 MG tablet  Commonly known as: PROSCAR   5 mg, Oral, Daily      FreeStyle " Mason 2 Sensor misc   1 Units, Other, Every 14 Days, Apply every 14 days and use to check blood sugar      Lantus SoloStar 100 UNIT/ML injection pen  Generic drug: Insulin Glargine   70 Units, Every Morning      Lantus SoloStar 100 UNIT/ML injection pen  Generic drug: Insulin Glargine   60 Units, Every Evening      losartan-hydrochlorothiazide 100-12.5 MG per tablet  Commonly known as: HYZAAR   1 tablet, Daily      metFORMIN  MG 24 hr tablet  Commonly known as: GLUCOPHAGE-XR   1,000 mg, Oral, 2 Times Daily      omeprazole 40 MG capsule  Commonly known as: priLOSEC   40 mg, Oral, 2 Times Daily      ondansetron ODT 8 MG disintegrating tablet  Commonly known as: ZOFRAN-ODT   8 mg, Translingual, Every 8 Hours PRN      tamsulosin 0.4 MG capsule 24 hr capsule  Commonly known as: FLOMAX   2 capsules, Daily      traZODone 100 MG tablet  Commonly known as: DESYREL   200 mg, Oral, Every Evening             Stop These Medications      HYDROcodone-acetaminophen 7.5-325 MG per tablet  Commonly known as: NORCO              Allergies   Allergen Reactions    Gabapentin Other (See Comments)     Severe shaking    Lyrica [Pregabalin] Other (See Comments)     Severe shaking    Codeine Itching and Rash         Discharge Disposition:   Home or Self Care    Diet:  Hospital:  Diet Order   Procedures    Diet: Diabetic; Consistent Carbohydrate; Fluid Consistency: Thin (IDDSI 0)         Discharge Activity:     As tolerated    CODE STATUS:  Code Status and Medical Interventions: CPR (Attempt to Resuscitate); Full Support   Ordered at: 03/17/25 2008     Code Status (Patient has no pulse and is not breathing):    CPR (Attempt to Resuscitate)     Medical Interventions (Patient has pulse or is breathing):    Full Support     Level Of Support Discussed With:    Patient         Future Appointments   Date Time Provider Department Center   3/27/2025  1:00 PM Mane oJn DO [facility]  [facility]   4/25/2025 10:45 AM Mustapha Barlow DO [facility]  [facility]           Time spent on Discharge including face to face service: 35 minutes    Signature: Electronically signed by Elyssa Marino MD, 03/22/25, 10:35 EDT.  [facility] Hospitalist Team

## 2025-03-22 NOTE — OUTREACH NOTE
Prep Survey      Flowsheet Row Responses   List of hospitals in [place] patient discharged from? [place]   Is LACE score < 7 ? No   Eligibility [facility]   Date of Admission 03/17/25   Date of Discharge 03/22/25   Discharge diagnosis Kyphoplasty L3 and L5 vertebral level   Does the patient have one of the following disease processes/diagnoses(primary or secondary)? General Surgery   Prep survey completed? Yes            Nicolasa DUTTON - Registered Nurse

## 2025-03-22 NOTE — PLAN OF CARE
Goal Outcome Evaluation:  Pt discharing. Ambulated with wife to end of hallway with no symptoms. Pt tolerated very well. Will continue to monitor.

## 2025-03-22 NOTE — PLAN OF CARE
Goal Outcome Evaluation:   Pt c/o pain and prn pain med given.  Pt states pain med is effective.  Pt instructed to call for assistance and call bell within pt's reach.  Will continue current plan of care.

## 2025-03-24 ENCOUNTER — TELEPHONE (OUTPATIENT)
Dept: DIABETES SERVICES | Facility: HOSPITAL | Age: 65
End: 2025-03-24
Payer: MEDICARE

## 2025-03-24 ENCOUNTER — TRANSITIONAL CARE MANAGEMENT TELEPHONE ENCOUNTER (OUTPATIENT)
Dept: CALL CENTER | Facility: HOSPITAL | Age: 65
End: 2025-03-24
Payer: MEDICARE

## 2025-03-24 NOTE — OUTREACH NOTE
Call Center TCM Note      Flowsheet Row Responses   [facility] facility patient discharged from? [facility]   Does the patient have one of the following disease processes/diagnoses(primary or secondary)? General Surgery   TCM attempt successful? No   Unsuccessful attempts Attempt 2            Nicolasa Dominguez RN    3/24/2025, 16:00 EDT

## 2025-03-24 NOTE — TELEPHONE ENCOUNTER
Pt's wife returned educator call. Wife states pt did receive the [employer]uche[employer] Guide Me meter, test strips and lancets. Wife states pt does not have any questions for educator at this time.

## 2025-03-24 NOTE — CASE MANAGEMENT/SOCIAL WORK
Case Management Discharge Note      Final Note: Routine home.         Selected Continued Care - Discharged on 3/22/2025 Admission date: 3/17/2025 - Discharge disposition: Home or Self Care       Transportation Services  Private: Car    Final Discharge Disposition Code: 01 - home or self-care

## 2025-03-24 NOTE — OUTREACH NOTE
Call Center TCM Note      Flowsheet Row Responses   [facility] patient discharged from? [facility]   Does the patient have one of the following disease processes/diagnoses(primary or secondary)? General Surgery   TCM attempt successful? No   Unsuccessful attempts Attempt 1            Nicolasa Dominguez RN    3/24/2025, 09:09 EDT

## 2025-03-25 ENCOUNTER — TRANSITIONAL CARE MANAGEMENT TELEPHONE ENCOUNTER (OUTPATIENT)
Dept: CALL CENTER | Facility: HOSPITAL | Age: 65
End: 2025-03-25
Payer: MEDICARE

## 2025-03-25 RX ORDER — METFORMIN HYDROCHLORIDE 500 MG/1
1000 TABLET, EXTENDED RELEASE ORAL 2 TIMES DAILY
Qty: 180 TABLET | Refills: 0 | Status: SHIPPED | OUTPATIENT
Start: 2025-03-25

## 2025-03-25 NOTE — OUTREACH NOTE
"Call Center TCM Note      Flowsheet Row Responses   [facility] patient discharged from? [facility]   Does the patient have one of the following disease processes/diagnoses(primary or secondary)? General Surgery   TCM attempt successful? Yes   Call start time 1046   Call end time 1051   Discharge diagnosis Kyphoplasty L3 and L5 vertebral level   Meds reviewed with patient/caregiver? Yes   Is the patient having any side effects they believe may be caused by any medication additions or changes? No   Does the patient have all medications related to this admission filled (includes all antibiotics, pain medications, etc.) Yes   Is the patient taking all medications as directed (includes completed medication regime)? Yes   Does the patient have an appointment with their PCP within 7-14 days of discharge? No   Nursing Interventions Patient declined scheduling/rescheduling appointment at this time, Patient desires to follow up with specialty only   Has home health visited the patient within 72 hours of discharge? N/A   Psychosocial issues? No   Did the patient receive a copy of their discharge instructions? Yes   Nursing interventions Reviewed instructions with patient   What is the patient's perception of their health status since discharge? Improving  [\"some pain\"]   Nursing interventions Nurse provided patient education   Is the patient /caregiver able to teach back basic post-op care? Drive as instructed by MD in discharge instructions, Take showers only when approved by MD-sponge bathe until then, No tub bath, swimming, or hot tub until instructed by MD, Keep incision areas clean,dry and protected, Lifting as instructed by MD in discharge instructions, Continue use of incentive spirometry at least 1 week post discharge   Is the patient/caregiver able to teach back signs and symptoms of incisional infection? Increased redness, swelling or pain at the incisonal site, Increased drainage or bleeding, Incisional warmth, Pus " or odor from incision, Fever   Is the patient/caregiver able to teach back steps to recovery at home? Set small, achievable goals for return to baseline health, Rest and rebuild strength, gradually increase activity, Practice good oral hygiene, Eat a well-balance diet, Make a list of questions for surgeon's appointment   If the patient is a current smoker, are they able to teach back resources for cessation? Not a smoker   Is the patient/caregiver able to teach back the hierarchy of who to call/visit for symptoms/problems? PCP, Specialist, Home health nurse, Urgent Care, ED, 911 Yes   TCM call completed? Yes   Call end time 1051            Cheyanne RAZO - Registered Nurse    3/25/2025, 10:51 EDT

## 2025-03-28 ENCOUNTER — OFFICE VISIT (OUTPATIENT)
Dept: FAMILY MEDICINE CLINIC | Facility: CLINIC | Age: 65
End: 2025-03-28
Payer: MEDICARE

## 2025-03-28 VITALS
HEART RATE: 82 BPM | BODY MASS INDEX: 43.91 KG/M2 | OXYGEN SATURATION: 94 % | SYSTOLIC BLOOD PRESSURE: 143 MMHG | HEIGHT: 70 IN | DIASTOLIC BLOOD PRESSURE: 80 MMHG | WEIGHT: 306.7 LBS | RESPIRATION RATE: 18 BRPM

## 2025-03-28 DIAGNOSIS — S32.030D COMPRESSION FRACTURE OF L3 VERTEBRA WITH ROUTINE HEALING, SUBSEQUENT ENCOUNTER: Primary | ICD-10-CM

## 2025-03-28 DIAGNOSIS — R55 SYNCOPE, UNSPECIFIED SYNCOPE TYPE: ICD-10-CM

## 2025-03-28 PROBLEM — S32.050A: Status: RESOLVED | Noted: 2025-03-17 | Resolved: 2025-03-28

## 2025-03-28 PROBLEM — S32.030A COMPRESSION FRACTURE OF L3 LUMBAR VERTEBRA, CLOSED, INITIAL ENCOUNTER: Status: RESOLVED | Noted: 2025-03-17 | Resolved: 2025-03-28

## 2025-03-28 RX ORDER — OXYCODONE HYDROCHLORIDE 10 MG/1
10 TABLET ORAL 4 TIMES DAILY PRN
Qty: 120 TABLET | Refills: 0 | Status: SHIPPED | OUTPATIENT
Start: 2025-03-28

## 2025-03-28 NOTE — PROGRESS NOTES
Subjective   Matthieu Hawk is a 64 y.o. male is here for follow up for compression fracture s/p kyphoplasty that was done and patient. Patient was last seen for kyphoplasty. He is returning for follow up. He had improved pain for about 2-3 days after kypho but had returned since then.     On last visit:     Lower back pain is 7/10 on VAS, at maximum is 8/10. Pain is sharp, shooting, and stabbing in nature. Pain is referred right posterior leg. The pain is constant. The pain is improved by nothing. The pain is worse with walking, standing.    Previous Injection:   3/18/2025-kyphoplasty L3 and L5- good relief for few days.       Hx: presented to [facility] ER after falling out of utility trailer on 3/13/2025.  He immediately had increased lower back pain and unfortunately worsened over the next couple days leading to ER on 3/17/2024.  Due to severe pain, patient was unable to walk and has been on escalating doses of pain medications while being inpatient and still unable to move significantly due to pain.  He denies any bowel or bladder incontinence or saddle anesthesia.  MRI lumbar spine was done showing acute compression fracture of L3 and L5 vertebral levels.  Evaluated by neurosurgery and not a surgical candidate.  Unable to tolerate TLSO due to severe pain.  He had been previously seen by outside pain management and had multiple epidurals and MBB's without significant pain relief.                     PMH:   Morbid obesity, DM-2, depression, CVA     Current Medications:   Norco  Cymbalta       Previous medications:   Flexeril  Gabapentin  Lyrica  Baclofen        PEG Assessment   What number best describes your pain on average in the past week?10  What number best describes how, during the past week, pain has interfered with your enjoyment of life?10  What number best describes how, during the past week, pain has interfered with your general activity?  10       Current Outpatient Medications:     Accu-Chek Softclix  "Lancets lancets, 1 each by Other route 4 (Four) Times a Day Before Meals & at Bedtime. Dx: E11.65. Use as instructed, Disp: 200 each, Rfl: 2    amLODIPine (NORVASC) 10 MG tablet, Take 1 tablet by mouth Daily. Indications: High Blood Pressure Disorder, Disp: 90 tablet, Rfl: 3    aspirin 81 MG chewable tablet, Chew 1 tablet Daily. Indications: Disease involving Lipid Deposits in the Arteries, Disp: 90 tablet, Rfl: 3    atorvastatin (LIPITOR) 20 MG tablet, Take 1 tablet by mouth Daily. Indications: High Amount of Fats in the Blood, Disp: 90 tablet, Rfl: 3    B-D UF III MINI PEN NEEDLES 31G X 5 MM misc, Inject 1 Needle under the skin into the appropriate area as directed 2 (Two) Times a Day. use as directed, Disp: 60 each, Rfl: 3    BD Insulin Syringe U/F 31G X 5/16\" 1 ML misc, Inject 1 syringe under the skin into the appropriate area as directed Daily., Disp: 60 each, Rfl: 1    carvedilol (COREG) 12.5 MG tablet, Take 1 tablet by mouth 2 (Two) Times a Day With Meals. Indications: High Blood Pressure Disorder, Disp: 180 tablet, Rfl: 3    Continuous Glucose Sensor (FreeStyle Mason 2 Sensor) misc, 1 Units by Other route Every 14 (Fourteen) Days. Apply every 14 days and use to check blood sugar, Disp: 4 each, Rfl: 3    dapagliflozin Propanediol 10 MG tablet, Take 10 mg by mouth Daily., Disp: , Rfl:     Dulaglutide 4.5 MG/0.5ML solution auto-injector, Inject 4.5 mg under the skin into the appropriate area as directed 1 (One) Time Per Week., Disp: 2 mL, Rfl: 11    DULoxetine (CYMBALTA) 60 MG capsule, Take 1 capsule by mouth 2 (Two) Times a Day. Indications: Fibromyalgia Syndrome, Disp: 180 capsule, Rfl: 3    finasteride (PROSCAR) 5 MG tablet, Take 1 tablet by mouth Daily. Indications: Benign Enlargement of Prostate, Disp: 90 tablet, Rfl: 3    Glucagon (Baqsimi One Pack) 3 MG/DOSE powder, Administer 3 mg into the nostril(s) as directed by provider As Needed (hypoglycemia)., Disp: 1 each, Rfl: 1    glucose blood (Accu-Chek " Guide Test) test strip, 1 each by Other route 4 (Four) Times a Day Before Meals & at Bedtime. Dx: E11.65. Use as instructed, Disp: 150 each, Rfl: 2    Insulin Glargine (Lantus SoloStar) 100 UNIT/ML injection pen, Inject 70 Units under the skin into the appropriate area as directed Every Morning., Disp: , Rfl:     Insulin Glargine (Lantus SoloStar) 100 UNIT/ML injection pen, Inject 60 Units under the skin into the appropriate area as directed Every Evening., Disp: , Rfl:     losartan-hydrochlorothiazide (HYZAAR) 100-12.5 MG per tablet, Take 1 tablet by mouth Daily., Disp: , Rfl:     metFORMIN ER (GLUCOPHAGE-XR) 500 MG 24 hr tablet, TAKE 2 TABLETS BY MOUTH TWICE DAILY, Disp: 180 tablet, Rfl: 0    naloxone (NARCAN) 4 MG/0.1ML nasal spray, Call 911. Don't prime. [place] in 1 nostril for overdose. Repeat in 2-3 minutes in other nostril if no or minimal breathing/responsiveness., Disp: 2 each, Rfl: 0    omeprazole (priLOSEC) 40 MG capsule, Take 1 capsule by mouth 2 (Two) Times a Day. Indications: Heartburn, Disp: 180 capsule, Rfl: 3    ondansetron ODT (ZOFRAN-ODT) 8 MG disintegrating tablet, Place 1 tablet on the tongue Every 8 (Eight) Hours As Needed for Nausea., Disp: 15 tablet, Rfl: 0    oxyCODONE (ROXICODONE) 10 MG tablet, Take 1 tablet by mouth 4 (Four) Times a Day As Needed for Severe Pain., Disp: 120 tablet, Rfl: 0    tamsulosin (FLOMAX) 0.4 MG capsule 24 hr capsule, Take 2 capsules by mouth Daily., Disp: , Rfl:     traZODone (DESYREL) 100 MG tablet, TAKE 2 TABLETS BY MOUTH EVERY EVENING, Disp: 180 tablet, Rfl: 1    meloxicam (MOBIC) 15 MG tablet, Take 1 tablet by mouth Daily As Needed for Moderate Pain for up to 60 days., Disp: 30 tablet, Rfl: 1    tiZANidine (ZANAFLEX) 4 MG tablet, Take 1 tablet by mouth 3 (Three) Times a Day As Needed for Muscle Spasms for up to 30 days., Disp: 90 tablet, Rfl: 0    The following portions of the patient's history were reviewed and updated as appropriate: allergies, current  medications, past family history, past medical history, past social history, past surgical history, and problem list.      REVIEW OF PERTINENT MEDICAL DATA    Past Medical History:   Diagnosis Date    Chronic back pain     Depression     Diabetes mellitus     Hypertension     Stroke      Past Surgical History:   Procedure Laterality Date    BONE SPUR LEG      CARPAL TUNNEL RELEASE Bilateral     RETINAL DETACHMENT REPAIR Bilateral     SINUS SURGERY      VERTEBROPLASTY N/A 3/21/2025    Procedure: Kyphoplasty L3 and L5 vertebral level;  Surgeon: Mane Jon DO;  Location: [facility] MAIN OR;  Service: Pain Management;  Laterality: N/A;     History reviewed. No pertinent family history.  Social History     Socioeconomic History    Marital status:    Tobacco Use    Smoking status: Former     Current packs/day: 0.00     Average packs/day: 2.0 packs/day for 18.0 years (36.0 ttl pk-yrs)     Types: Cigarettes     Start date:      Quit date:      Years since quittin.2     Passive exposure: Never    Smokeless tobacco: Never   Vaping Use    Vaping status: Never Used   Substance and Sexual Activity    Alcohol use: Yes    Drug use: Not Currently    Sexual activity: Defer         Review of Systems   Musculoskeletal:  Positive for arthralgias and back pain.         Vitals:    25 1500   BP: 133/76   Pulse: 92   Resp: 16   SpO2: 98%   Weight: (!) 139 kg (307 lb)   PainSc: 7          Objective   Physical Exam  Musculoskeletal:         General: Tenderness present.        Legs:            Imaging Reviewed:  MRI lumbar spine with and without contrast-3/17/2025  - Acute mild compression fracture of inferior endplate of L3 and superior endplate of L5 without significant osseous retropulsion.  Mild associated bone marrow edema and enhancement.  - Increased prominence of ventral epidural space from L3-L4 through the sacrum with both steer hyper and hypointense signal-suspect mostly combination of epidural fat and  prominence of epidural venous plexus though a small epidural hematoma in the setting of trauma is not excluded  L1-2-moderate facet arthropathy  L2-3-moderate bilateral facet arthropathy, ligamentum flavum thickening, small bilateral facet effusions  L3-4-severe bilateral facet arthropathy, ligamentum flavum thickening, moderate spinal canal stenosis.  Moderate to severe bilateral foraminal narrowing.  L4-5-severe bilateral facet arthropathy, ligamentum flavum thickening  L5-S1-mild spinal canal stenosis due to epidural process as above.  Moderate bilateral facet arthropathy.    Assessment:    1. Lumbar spondylosis    2. Lumbar radiculopathy         Plan:   S/p kyphoplasty at L3 and L5 with significant improvement in pain for short treatment pain has returned.  Patient does have chronic history of lower back pain for which she had multiple epidurals and MBB's at previous pain management without significant improvement in pain.  Patient is hesitant for any further injections due to previous experiences of injections increasing his pain.  I did discuss possibility of bilateral MBB L3-L5 as patient has significant tenderness over this area and MRI shows severe bilateral facet arthropathy.  I also discussed possibility of lumbar MRI to rule out any additional fractures but patient would like to hold off for now.  Start Meloxicam 15 mg daily PRN. Patient informed of increased risk of heart attacks, stroke and kidney problems in addition to gastric ulcers with use of non-steroidal anti-inflammatory medications.  Start Tizanidine 4 mg TID PRN. Common side effects discussed with the patient including but not limited to dry mouth, drowsiness, dizziness, lightheadedness, constipation, weakness and tiredness.   He has failed gabapentin, Lyrica in past.       RTC in 1 month.    INSPECT REPORT    As part of the patient's treatment plan, I may be prescribing controlled substances. The patient has been made aware of appropriate  use of such medications, including potential risk of somnolence, limited ability to drive and/or work safely, and the potential for dependence or overdose. It has also been made clear that these medications are for use by this patient only, without concomitant use of alcohol or other substances unless prescribed.     Patient has completed prescribing agreement detailing terms of continued prescribing of controlled substances, including monitoring INSPECT reports, urine drug screening, and pill counts if necessary. The patient is aware that inappropriate use will results in cessation of prescribing such medications.    INSPECT report has been reviewed and scanned into the patient's chart.

## 2025-03-28 NOTE — PROGRESS NOTES
"Transitional Care Follow Up Visit  Subjective     Matthieu Hawk is a 64 y.o. male who presents for a transitional care management visit.    Within 48 business hours after discharge our office contacted him via telephone to coordinate his care and needs.      I reviewed and discussed the details of that call along with the discharge summary, hospital problems, inpatient lab results, inpatient diagnostic studies, and consultation reports with Matthieu.     Current outpatient and discharge medications have been reconciled for the patient.  Reviewed by: Mustapha Barlow DO          3/22/2025     2:02 PM   Date of TCM Phone Call   [facility]   Date of Admission 3/17/2025   Date of Discharge 3/22/2025     Risk for Readmission (LACE) Score: 12 (3/22/2025  6:00 AM)      History of Present Illness   Course During Hospital Stay:      Patient hospitalized from 3/17 - 3/22.  On 3/13 he fell off of a trailer landing on his lower back.  Patient states that he passed out prior to falling and does not remember tripping or anything that caused him to fall.  He went to the [facility] and was discharged from the ED after having normal x-rays.  Pain gradually worsened and he started to have difficulty ambulating; he went to [facility] ED on 3/17 and MRI revealed multiple compression fractures.  Neurosurgery recommended pain management consult.  Pain management performed kyphoplasty on 3/21.  Patient was doing well s/p surgery and discharged home with prescription for oxycodone in place of his previously prescribed Norco.      Patient states overall his pain was doing significantly better for a few days but it has gradually worsened again throughout the week.  Pain level at this time is about the same as before surgery.  He has only been taking the oxycodone twice a day due to concerns about \"getting in trouble\" due to his controlled substance contract with me.  He follows up with his pain specialist on " "3/31.      Patient states that he has had recurring syncopal episodes ever since his stroke in June 2024.  They occur multiple times each week and he loses consciousness for a few seconds.  Most of the time he has some sort of prodrome such as lightheadedness or shaking in his arms and legs.      The following portions of the patient's history were reviewed and updated as appropriate: allergies, current medications, past family history, past medical history, past social history, past surgical history, and problem list.    Review of Systems    Objective   /80   Pulse 82   Resp 18   Ht 177.8 cm (70\")   Wt (!) 139 kg (306 lb 11.2 oz)   SpO2 94%   BMI 44.01 kg/m²   Physical Exam  GEN: Mild distress due to pain, non toxic appearing  HEENT: MMM. EOMI.   CV: No extremity edema.   RESP: No signs of respiratory distress.  SKIN: No rashes  MSK: No deformity.   NEURO: Moves all extremities equally. Alert and appropriate        Assessment & Plan   Diagnoses and all orders for this visit:    1. Compression fracture of L3 vertebra with routine healing, subsequent encounter (Primary)  Given his degree of pain advised that he increase the oxycodone to 4 times daily  Hopefully he will not need to have this high of pain medication long-term and as he recovers from surgery we will be able to wean back to his previously prescribed Norco 4 times daily.  Consider aqua therapy once cleared by pain specialist    In addition to oxycodone, counseled on appropriate use of Tylenol and NSAIDs.  Can take Tylenol 1000 mg every 6 hours as well as naproxen 220 mg twice daily.      -     oxyCODONE (ROXICODONE) 10 MG tablet; Take 1 tablet by mouth 4 (Four) Times a Day As Needed for Severe Pain.  Dispense: 120 tablet; Refill: 0    2. Syncope  Patient with recurring syncopal episodes ever since stroke in June 2024  Sometimes with prodrome such as lightheadedness or shaking in the arms and legs    Will obtain cardiac monitor, EEG to evaluate " further    He did have a TTE performed during his hospitalization for the stroke in June in which multiple valves were not well-visualized.  However, he has not had any previously documented murmurs making him overall low risk for having significant valvular disease.  If above testing is negative will pursue repeat TTE.      -     Cardiac Event Monitor (MIGUEL) or Mobile Cardiac Outpatient Telemetry (MCT); Future  -     EEG; Future    Other orders  -     naloxone (NARCAN) 4 MG/0.1ML nasal spray; Call 911. Don't prime. [place] in 1 nostril for overdose. Repeat in 2-3 minutes in other nostril if no or minimal breathing/responsiveness.  Dispense: 2 each; Refill: 0  Diagnoses and all orders for this visit:    1. Compression fracture of L3 vertebra with routine healing, subsequent encounter (Primary)  -     oxyCODONE (ROXICODONE) 10 MG tablet; Take 1 tablet by mouth 4 (Four) Times a Day As Needed for Severe Pain.  Dispense: 120 tablet; Refill: 0    2. Syncope, unspecified syncope type  -     Cardiac Event Monitor (MIGUEL) or Mobile Cardiac Outpatient Telemetry (MCT); Future  -     EEG; Future    Other orders  -     naloxone (NARCAN) 4 MG/0.1ML nasal spray; Call 911. Don't prime. [place] in 1 nostril for overdose. Repeat in 2-3 minutes in other nostril if no or minimal breathing/responsiveness.  Dispense: 2 each; Refill: 0          I spent 60 minutes caring for Matthieu on this date of service. This time includes time spent by me in the following activities:preparing for the visit, reviewing tests, obtaining and/or reviewing a separately obtained history, performing a medically appropriate examination and/or evaluation , counseling and educating the patient/family/caregiver, ordering medications, tests, or procedures, documenting information in the medical record, independently interpreting results and communicating that information with the patient/family/caregiver, and care coordination

## 2025-03-31 ENCOUNTER — OFFICE VISIT (OUTPATIENT)
Dept: PAIN MEDICINE | Facility: CLINIC | Age: 65
End: 2025-03-31
Payer: MEDICARE

## 2025-03-31 VITALS
HEART RATE: 92 BPM | DIASTOLIC BLOOD PRESSURE: 76 MMHG | OXYGEN SATURATION: 98 % | RESPIRATION RATE: 16 BRPM | WEIGHT: 307 LBS | SYSTOLIC BLOOD PRESSURE: 133 MMHG | BODY MASS INDEX: 44.05 KG/M2

## 2025-03-31 DIAGNOSIS — M54.16 LUMBAR RADICULOPATHY: ICD-10-CM

## 2025-03-31 DIAGNOSIS — M47.816 LUMBAR SPONDYLOSIS: Primary | ICD-10-CM

## 2025-03-31 PROCEDURE — 1125F AMNT PAIN NOTED PAIN PRSNT: CPT | Performed by: STUDENT IN AN ORGANIZED HEALTH CARE EDUCATION/TRAINING PROGRAM

## 2025-03-31 PROCEDURE — 3075F SYST BP GE 130 - 139MM HG: CPT | Performed by: STUDENT IN AN ORGANIZED HEALTH CARE EDUCATION/TRAINING PROGRAM

## 2025-03-31 PROCEDURE — 1159F MED LIST DOCD IN RCRD: CPT | Performed by: STUDENT IN AN ORGANIZED HEALTH CARE EDUCATION/TRAINING PROGRAM

## 2025-03-31 PROCEDURE — 99024 POSTOP FOLLOW-UP VISIT: CPT | Performed by: STUDENT IN AN ORGANIZED HEALTH CARE EDUCATION/TRAINING PROGRAM

## 2025-03-31 PROCEDURE — 3078F DIAST BP <80 MM HG: CPT | Performed by: STUDENT IN AN ORGANIZED HEALTH CARE EDUCATION/TRAINING PROGRAM

## 2025-03-31 PROCEDURE — 1160F RVW MEDS BY RX/DR IN RCRD: CPT | Performed by: STUDENT IN AN ORGANIZED HEALTH CARE EDUCATION/TRAINING PROGRAM

## 2025-03-31 RX ORDER — MELOXICAM 15 MG/1
15 TABLET ORAL DAILY PRN
Qty: 30 TABLET | Refills: 1 | Status: SHIPPED | OUTPATIENT
Start: 2025-03-31 | End: 2025-05-30

## 2025-04-02 ENCOUNTER — TELEPHONE (OUTPATIENT)
Dept: FAMILY MEDICINE CLINIC | Facility: CLINIC | Age: 65
End: 2025-04-02
Payer: MEDICARE

## 2025-04-02 NOTE — TELEPHONE ENCOUNTER
Caller: CLYDE SPEARS    Relationship to patient: Emergency Contact    Best call back number: [phone]      Patient is needing: PATIENT'S WIFE CALLING TO CHECK ON STATUS OF SCHEDULING THE TEST PCP ORDERED LAST FRIDAY. THEY HAVE NOT HEARD ANYTHING FROM ANYONE REGARDING SCHEDULING. PLEASE CALL BACK TO DISCUSS

## 2025-04-14 ENCOUNTER — HOSPITAL ENCOUNTER (OUTPATIENT)
Dept: NEUROLOGY | Facility: HOSPITAL | Age: 65
Discharge: HOME | End: 2025-04-14
Payer: MEDICARE

## 2025-04-14 ENCOUNTER — HOSPITAL ENCOUNTER (OUTPATIENT)
Dept: RESPIRATORY THERAPY | Facility: HOSPITAL | Age: 65
Discharge: HOME | End: 2025-04-14
Payer: MEDICARE

## 2025-04-14 DIAGNOSIS — R55 SYNCOPE, UNSPECIFIED SYNCOPE TYPE: ICD-10-CM

## 2025-04-14 PROCEDURE — 95819 EEG AWAKE AND ASLEEP: CPT

## 2025-04-22 RX ORDER — METFORMIN HYDROCHLORIDE 500 MG/1
1000 TABLET, EXTENDED RELEASE ORAL 2 TIMES DAILY
Qty: 180 TABLET | Refills: 0 | Status: SHIPPED | OUTPATIENT
Start: 2025-04-22

## 2025-04-25 ENCOUNTER — OFFICE VISIT (OUTPATIENT)
Dept: FAMILY MEDICINE CLINIC | Facility: CLINIC | Age: 65
End: 2025-04-25
Payer: MEDICARE

## 2025-04-25 VITALS
RESPIRATION RATE: 18 BRPM | WEIGHT: 309.4 LBS | HEIGHT: 70 IN | DIASTOLIC BLOOD PRESSURE: 74 MMHG | BODY MASS INDEX: 44.29 KG/M2 | HEART RATE: 97 BPM | OXYGEN SATURATION: 96 % | SYSTOLIC BLOOD PRESSURE: 112 MMHG

## 2025-04-25 DIAGNOSIS — Z79.4 TYPE 2 DIABETES MELLITUS WITH DIABETIC POLYNEUROPATHY, WITH LONG-TERM CURRENT USE OF INSULIN: ICD-10-CM

## 2025-04-25 DIAGNOSIS — E11.65 UNCONTROLLED DIABETES MELLITUS WITH HYPERGLYCEMIA, WITH LONG-TERM CURRENT USE OF INSULIN: ICD-10-CM

## 2025-04-25 DIAGNOSIS — Z00.00 INITIAL MEDICARE ANNUAL WELLNESS VISIT: Primary | ICD-10-CM

## 2025-04-25 DIAGNOSIS — Z79.4 UNCONTROLLED DIABETES MELLITUS WITH HYPERGLYCEMIA, WITH LONG-TERM CURRENT USE OF INSULIN: ICD-10-CM

## 2025-04-25 DIAGNOSIS — E11.42 TYPE 2 DIABETES MELLITUS WITH DIABETIC POLYNEUROPATHY, WITH LONG-TERM CURRENT USE OF INSULIN: ICD-10-CM

## 2025-04-25 DIAGNOSIS — R55 SYNCOPE, UNSPECIFIED SYNCOPE TYPE: ICD-10-CM

## 2025-04-25 NOTE — PROGRESS NOTES
Procedure              ECG 12 Lead    Date/Time: 4/25/2025 2:11 PM  Performed by: Mustapha Barlow DO    Authorized by: Mustapha Barlow DO  Comparison: compared with previous ECG from 6/14/2024  Similar to previous ECG  Rhythm: sinus rhythm  Rate: normal  Conduction: right bundle branch block and 1st degree AV block  Conduction comments: ME interval 201 ms  QRS axis: normal  Other findings: non-specific ST-T wave changes    Clinical impression: abnormal EKG and non-specific ECG

## 2025-04-25 NOTE — ASSESSMENT & PLAN NOTE
Chronic, uncontrolled condition  Has had multiple episodes of hypoglycemia and hyperglycemia over the last few weeks.  Limited data available on freestyle gris.    Given the hypoglycemia in the early morning we will decrease nighttime dose of insulin Lantus from 60 to 50 units.  Continue 70 units a.m.    Given his labile blood sugars will refer to endocrinology.  I do believe he would benefit greatly from Omnipod.      Orders:    Ambulatory Referral to Endocrinology

## 2025-04-25 NOTE — PROGRESS NOTES
" Subjective   The ABCs of the Annual Wellness Visit  Medicare Wellness Visit      Matthieu Hawk is a 64 y.o. patient who presents for a Medicare Wellness Visit.    The following portions of the patient's history were reviewed and   updated as appropriate: allergies, current medications, past family history, past medical history, past social history, past surgical history, and problem list.    Compared to one year ago, the patient's physical   health is worse.  Compared to one year ago, the patient's mental   health is worse.    Recent Hospitalizations:  This patient has had a [facility] admission record on file within the last 365 days.  Current Medical Providers:  Patient Care Team:  Mustapha Barlow,  as PCP - General (Family Medicine)    Outpatient Medications Prior to Visit   Medication Sig Dispense Refill    Accu-Chek Softclix Lancets lancets 1 each by Other route 4 (Four) Times a Day Before Meals & at Bedtime. Dx: E11.65. Use as instructed 200 each 2    amLODIPine (NORVASC) 10 MG tablet Take 1 tablet by mouth Daily. Indications: High Blood Pressure Disorder 90 tablet 3    aspirin 81 MG chewable tablet Chew 1 tablet Daily. Indications: Disease involving Lipid Deposits in the Arteries 90 tablet 3    atorvastatin (LIPITOR) 20 MG tablet Take 1 tablet by mouth Daily. Indications: High Amount of Fats in the Blood 90 tablet 3    B-D UF III MINI PEN NEEDLES 31G X 5 MM misc Inject 1 Needle under the skin into the appropriate area as directed 2 (Two) Times a Day. use as directed 60 each 3    BD Insulin Syringe U/F 31G X 5/16\" 1 ML misc Inject 1 syringe under the skin into the appropriate area as directed Daily. 60 each 1    carvedilol (COREG) 12.5 MG tablet Take 1 tablet by mouth 2 (Two) Times a Day With Meals. Indications: High Blood Pressure Disorder 180 tablet 3    Continuous Glucose Sensor (FreeStyle Mason 2 Sensor) misc 1 Units by Other route Every 14 (Fourteen) Days. Apply every 14 days and use to " check blood sugar 4 each 3    dapagliflozin Propanediol 10 MG tablet Take 10 mg by mouth Daily.      Dulaglutide 4.5 MG/0.5ML solution auto-injector Inject 4.5 mg under the skin into the appropriate area as directed 1 (One) Time Per Week. 2 mL 11    DULoxetine (CYMBALTA) 60 MG capsule Take 1 capsule by mouth 2 (Two) Times a Day. Indications: Fibromyalgia Syndrome 180 capsule 3    finasteride (PROSCAR) 5 MG tablet Take 1 tablet by mouth Daily. Indications: Benign Enlargement of Prostate 90 tablet 3    Glucagon (Baqsimi One Pack) 3 MG/DOSE powder Administer 3 mg into the nostril(s) as directed by provider As Needed (hypoglycemia). 1 each 1    glucose blood (Accu-Chek Guide Test) test strip 1 each by Other route 4 (Four) Times a Day Before Meals & at Bedtime. Dx: E11.65. Use as instructed 150 each 2    Insulin Glargine (Lantus SoloStar) 100 UNIT/ML injection pen Inject 70 Units under the skin into the appropriate area as directed Every Morning.      Insulin Glargine (Lantus SoloStar) 100 UNIT/ML injection pen Inject 50 Units under the skin into the appropriate area as directed Every Evening.      losartan-hydrochlorothiazide (HYZAAR) 100-12.5 MG per tablet Take 1 tablet by mouth Daily.      meloxicam (MOBIC) 15 MG tablet Take 1 tablet by mouth Daily As Needed for Moderate Pain for up to 60 days. 30 tablet 1    metFORMIN ER (GLUCOPHAGE-XR) 500 MG 24 hr tablet TAKE 2 TABLETS BY MOUTH TWICE DAILY 180 tablet 0    naloxone (NARCAN) 4 MG/0.1ML nasal spray Call 911. Don't prime. [place] in 1 nostril for overdose. Repeat in 2-3 minutes in other nostril if no or minimal breathing/responsiveness. 2 each 0    omeprazole (priLOSEC) 40 MG capsule Take 1 capsule by mouth 2 (Two) Times a Day. Indications: Heartburn 180 capsule 3    ondansetron ODT (ZOFRAN-ODT) 8 MG disintegrating tablet Place 1 tablet on the tongue Every 8 (Eight) Hours As Needed for Nausea. 15 tablet 0    oxyCODONE (ROXICODONE) 10 MG tablet Take 1 tablet by mouth 4  (Four) Times a Day As Needed for Severe Pain. 120 tablet 0    tiZANidine (ZANAFLEX) 4 MG tablet Take 1 tablet by mouth 3 (Three) Times a Day As Needed for Muscle Spasms for up to 30 days. 90 tablet 0    traZODone (DESYREL) 100 MG tablet TAKE 2 TABLETS BY MOUTH EVERY EVENING 180 tablet 1    tamsulosin (FLOMAX) 0.4 MG capsule 24 hr capsule Take 2 capsules by mouth Daily.       No facility-administered medications prior to visit.     Opioid medication/s are on active medication list.  and I have evaluated his active treatment plan and pain score trends (see table).  Vitals:    04/25/25 1257   PainSc: 6    PainLoc: Back     I have reviewed the chart for potential of high risk medication and harmful drug interactions in the elderly.        Aspirin is on active medication list. Aspirin use is indicated based on review of current medical condition/s. Pros and cons of this therapy have been discussed today. Benefits of this medication outweigh potential harm.  Patient has been encouraged to continue taking this medication.  .      Patient Active Problem List   Diagnosis    Chronic pain disorder    Degenerative disc disease, lumbar    Degenerative disc disease, thoracic    Depressive disorder    Essential (primary) hypertension    Gastroesophageal reflux disease without esophagitis    Insulin long-term use    Long term prescription opiate use    Mixed hyperlipidemia    Moderate persistent asthma without complication    Obesity, morbid    Type 2 diabetes mellitus with diabetic polyneuropathy, with long-term current use of insulin    History of stroke    Uncontrolled diabetes mellitus with hyperglycemia, with long-term current use of insulin    Lumbar compression fracture     Advance Care Planning Advance Directive is not on file.  ACP discussion was held with the patient during this visit. Patient does not have an advance directive, information provided.            Objective   Vitals:    04/25/25 1257   BP: 112/74   Pulse: 97  "  Resp: 18   SpO2: 96%   Weight: (!) 140 kg (309 lb 6.4 oz)   Height: 177.8 cm (70\")   PainSc: 6    PainLoc: Back       Estimated body mass index is 44.39 kg/m² as calculated from the following:    Height as of this encounter: 177.8 cm (70\").    Weight as of this encounter: 140 kg (309 lb 6.4 oz).    Class 3 Severe Obesity (BMI >=40). Obesity-related health conditions include the following: hypertension and diabetes mellitus. Obesity is improving with lifestyle modifications. BMI is is above average; BMI management plan is completed. We discussed low calorie, low carb based diet program, portion control, and increasing exercise.        Gait and Balance Evaluation:  Slow Tentative Pace and Walker    Does the patient have evidence of cognitive impairment? No                                                                                                Health  Risk Assessment    Smoking Status:  Social History     Tobacco Use   Smoking Status Former    Current packs/day: 0.00    Average packs/day: 2.0 packs/day for 18.0 years (36.0 ttl pk-yrs)    Types: Cigarettes    Start date:     Quit date:     Years since quittin.3    Passive exposure: Never   Smokeless Tobacco Never     Alcohol Consumption:  Social History     Substance and Sexual Activity   Alcohol Use Yes       Fall Risk Screen  STEADI Fall Risk Assessment was completed, and patient is at HIGH risk for falls. Assessment completed on:2025    Depression Screening   Little interest or pleasure in doing things? Not at all   Feeling down, depressed, or hopeless? Not at all   PHQ-2 Total Score 0      Health Habits and Functional and Cognitive Screenin/25/2025     1:06 PM   Functional & Cognitive Status   Do you have difficulty preparing food and eating? No   Do you have difficulty bathing yourself, getting dressed or grooming yourself? Yes   Do you have difficulty using the toilet? No   Do you have difficulty moving around from place to " place? No   Do you have trouble with steps or getting out of a bed or a chair? No   Current Diet Well Balanced Diet   Dental Exam Not up to date   Eye Exam Not up to date   Exercise (times per week) 0 times per week   Current Exercises Include No Regular Exercise   Do you need help using the phone?  No   Are you deaf or do you have serious difficulty hearing?  No   Do you need help to go to places out of walking distance? No   Do you need help shopping? No   Do you need help preparing meals?  No   Do you need help with housework?  No   Do you need help with laundry? No   Do you need help taking your medications? No   Do you need help managing money? No   Do you ever drive or ride in a car without wearing a seat belt? Yes   Have you felt unusual stress, anger or loneliness in the last month? Yes   Who do you live with? Spouse   If you need help, do you have trouble finding someone available to you? Yes   Have you been bothered in the last four weeks by sexual problems? No   Do you have difficulty concentrating, remembering or making decisions? No   Visual Acuity:  Vision Screening    Right eye Left eye Both eyes   Without correction      With correction 20/100 20/70 20/100     Age-appropriate Screening Schedule:  Refer to the list below for future screening recommendations based on patient's age, sex and/or medical conditions. Orders for these recommended tests are listed in the plan section. The patient has been provided with a written plan.    Health Maintenance List  Health Maintenance   Topic Date Due    DIABETIC FOOT EXAM  Never done    URINE MICROALBUMIN-CREATININE RATIO (uACR)  Never done    Pneumococcal Vaccine 50+ (1 of 2 - PCV) Never done    TDAP/TD VACCINES (1 - Tdap) Never done    COLORECTAL CANCER SCREENING  Never done    ZOSTER VACCINE (1 of 2) Never done    HEPATITIS C SCREENING  Never done    DIABETIC EYE EXAM  02/14/2025    HEMOGLOBIN A1C  07/24/2025    COVID-19 Vaccine (1 - 2024-25 season)  "05/09/2025 (Originally 9/1/2024)    INFLUENZA VACCINE  07/01/2025    LIPID PANEL  08/09/2025    ANNUAL WELLNESS VISIT  04/25/2026    AAA SCREEN ONCE  Completed                                                                                                                                                CMS Preventative Services Quick Reference  Risk Factors Identified During Encounter  Chronic Pain:  Follows with pain management  Fall Risk-High or Moderate:  Due to low back issues, follows with pain management. Discussed using walker more often instead of cane.  Vision Screening Recommended    The above risks/problems have been discussed with the patient.  Pertinent information has been shared with the patient in the After Visit Summary.  An After Visit Summary and PPPS were made available to the patient.    Follow Up:   Next Medicare Wellness visit to be scheduled in 1 year.         Additional E&M Note during same encounter follows:  Patient has additional, significant, and separately identifiable condition(s)/problem(s) that require work above and beyond the Medicare Wellness Visit     Chief Complaint  Welcome To Medicare    Subjective   HPI  Matthieu is also being seen today for additional medical problem/s.          Syncopal spells  Since last appointment has continued to have intermittent dizzy spells sometimes follow syncope  They typically occur while he is standing but not immediately after he stands up.  A few times it has happened after he walks to the bathroom to urinate.  Does not check blood pressure      Type 2 diabetes  Over the last few weeks does not have consistent blood sugar readings.  With the limited data available he does have 2 episodes of hypoglycemia with blood sugars in the 50s.         Objective   Vital Signs:  /74   Pulse 97   Resp 18   Ht 177.8 cm (70\")   Wt (!) 140 kg (309 lb 6.4 oz)   SpO2 96%   BMI 44.39 kg/m²   Physical Exam  GEN: In no acute distress, non toxic " appearing  HEENT: MMM. EOMI.   CV: No extremity edema.   RESP: No signs of respiratory distress.  SKIN: No rashes  MSK: No deformity.   NEURO: Moves all extremities equally. Alert and appropriate                   Assessment and Plan      Initial Medicare annual wellness visit    Encourage healthy lifestyle choices such as healthy diet choices (low carbohydrates, increase fruits/vegetables, less processed foods, limiting portion sizes) as well as increasing physical activity with goal of 150 minutes of moderate intensity exercise per week.      Screening  - CRC: Discussed options, has Cologuard at home  - Urine microalbumin: Will obtain at next visit    Immunizations  - Shingles: Discussed previously, declines  - Pneumonia: Discussed previously, declined        Orders:    ECG 12 Lead    Type 2 diabetes mellitus with diabetic polyneuropathy, with long-term current use of insulin    Orders:    Ambulatory Referral to Endocrinology    Uncontrolled diabetes mellitus with hyperglycemia, with long-term current use of insulin  Chronic, uncontrolled condition  Has had multiple episodes of hypoglycemia and hyperglycemia over the last few weeks.  Limited data available on freestyle gris.    Given the hypoglycemia in the early morning we will decrease nighttime dose of insulin Lantus from 60 to 50 units.  Continue 70 units a.m.    Given his labile blood sugars will refer to endocrinology.  I do believe he would benefit greatly from Omnipod.      Orders:    Ambulatory Referral to Endocrinology    Syncope, unspecified syncope type  Undiagnosed new problem uncertain prognosis  Currently has cardiac monitor, will await results.  EKG obtained in office today for Medicare visit and VT interval is very minimally elevated at 201 ms however should not be having syncope from first-degree heart block.    EEG performed few weeks ago and normal.    Will discontinue Flomax at this time given the potential for orthostatic hypotension.   Advised wife to monitor blood pressure closely when he is having these dizzy/syncope episodes and call the office if she notices any systolic less than 100.    Advise given his syncopal episodes as well as vision screening should not be driving and he verbalizes understanding.                 Follow Up   Return in about 3 months (around 7/25/2025) for Recheck - 30 minutes .  Patient was given instructions and counseling regarding his condition or for health maintenance advice. Please see specific information pulled into the AVS if appropriate.

## 2025-04-28 DIAGNOSIS — S32.030D COMPRESSION FRACTURE OF L3 VERTEBRA WITH ROUTINE HEALING, SUBSEQUENT ENCOUNTER: ICD-10-CM

## 2025-04-28 NOTE — TELEPHONE ENCOUNTER
Caller: TORYCLYDE    Relationship: Emergency Contact    Best call back number: [phone]    Requested Prescriptions:   Requested Prescriptions     Pending Prescriptions Disp Refills    oxyCODONE (ROXICODONE) 10 MG tablet 120 tablet 0     Sig: Take 1 tablet by mouth 4 (Four) Times a Day As Needed for Severe Pain.      [facility] sent[facility] [employer] [facility] [place] - [address] [facility] [phone] [facility] [phone]      Last office visit with prescribing clinician: 4/25/2025   Last telemedicine visit with prescribing clinician: Visit date not found   Next office visit with prescribing clinician: 7/25/2025     Does the patient have less than a 3 day supply:  [x] Yes  [] No    Hermelindo Izaguirre Rep   04/28/25 12:03 EDT

## 2025-04-29 RX ORDER — OXYCODONE HYDROCHLORIDE 10 MG/1
10 TABLET ORAL 4 TIMES DAILY PRN
Qty: 120 TABLET | Refills: 0 | Status: SHIPPED | OUTPATIENT
Start: 2025-04-29

## 2025-05-01 ENCOUNTER — TELEPHONE (OUTPATIENT)
Dept: FAMILY MEDICINE CLINIC | Facility: CLINIC | Age: 65
End: 2025-05-01
Payer: MEDICARE

## 2025-05-01 NOTE — TELEPHONE ENCOUNTER
Caller: CLYDE SPEARS    Relationship: Emergency Contact    Best call back number: [phone]    What is the best time to reach you: ANYTIME    Who are you requesting to speak with (clinical staff, provider,  specific staff member): CLINICAL     What was the call regarding: PATIENTS WIFE IS CALLING IN TO DISCUSS HUSBANDS DIZZINESS. SHE STATES THE MEDICINE THAT WAS PRESCRIBED WORKED WHILE HE WAS TAKING IT BUT HE IS STILL VERY DIZZY. WANTS TO KNOW WHAT ELSE TO TAKE OR TRY     Is it okay if the provider responds through MyChart: NO

## 2025-05-01 NOTE — TELEPHONE ENCOUNTER
Patient wife states  dizziness remains after you removed the Flomax. They don't think that is the reason for the dizziness. Should he resume the Flomax and what can they do now for the dizziness?

## 2025-05-01 NOTE — PROGRESS NOTES
Subjective   Matthieu Hawk is a 64 y.o. male is here for follow up for lower back pain.  Patient was last seen on 3/31/2025.    On last visit: Started meloxicam and tizanidine- didn't help     Lower back pain is 7/10 on VAS, at maximum is 8/10. Pain is sharp, shooting, and stabbing in nature. Pain is referred intermittently to right posterior leg. The pain is constant. The pain is improved by nothing. The pain is worse with walking, standing. His main complaint is b/l buttock pain.     Previous Injection:   3/18/2025-kyphoplasty L3 and L5- good relief for few days.       Hx: presented to [facility] ER after falling out of utility trailer on 3/13/2025.  He immediately had increased lower back pain and unfortunately worsened over the next couple days leading to ER on 3/17/2024.  Due to severe pain, patient was unable to walk and has been on escalating doses of pain medications while being inpatient and still unable to move significantly due to pain.  He denies any bowel or bladder incontinence or saddle anesthesia.  MRI lumbar spine was done showing acute compression fracture of L3 and L5 vertebral levels.  Evaluated by neurosurgery and not a surgical candidate.  Unable to tolerate TLSO due to severe pain.  He had been previously seen by outside pain management and had multiple epidurals and MBB's without significant pain relief.                  PMH:   Morbid obesity, DM-2, depression, CVA     Current Medications:   Norco  Cymbalta       Previous medications:   Flexeril  Gabapentin  Lyrica  Baclofen        PEG Assessment   What number best describes your pain on average in the past week?10  What number best describes how, during the past week, pain has interfered with your enjoyment of life?10  What number best describes how, during the past week, pain has interfered with your general activity?  10       Current Outpatient Medications:     Accu-Chek Softclix Lancets lancets, 1 each by Other route 4 (Four) Times a Day  "Before Meals & at Bedtime. Dx: E11.65. Use as instructed, Disp: 200 each, Rfl: 2    amLODIPine (NORVASC) 10 MG tablet, Take 1 tablet by mouth Daily. Indications: High Blood Pressure Disorder, Disp: 90 tablet, Rfl: 3    aspirin 81 MG chewable tablet, Chew 1 tablet Daily. Indications: Disease involving Lipid Deposits in the Arteries, Disp: 90 tablet, Rfl: 3    atorvastatin (LIPITOR) 20 MG tablet, Take 1 tablet by mouth Daily. Indications: High Amount of Fats in the Blood, Disp: 90 tablet, Rfl: 3    B-D UF III MINI PEN NEEDLES 31G X 5 MM misc, Inject 1 Needle under the skin into the appropriate area as directed 2 (Two) Times a Day. use as directed, Disp: 60 each, Rfl: 3    BD Insulin Syringe U/F 31G X 5/16\" 1 ML misc, Inject 1 syringe under the skin into the appropriate area as directed Daily., Disp: 60 each, Rfl: 1    carvedilol (COREG) 12.5 MG tablet, Take 1 tablet by mouth 2 (Two) Times a Day With Meals. Indications: High Blood Pressure Disorder, Disp: 180 tablet, Rfl: 3    Continuous Glucose Sensor (FreeStyle Mason 2 Sensor) misc, 1 Units by Other route Every 14 (Fourteen) Days. Apply every 14 days and use to check blood sugar, Disp: 4 each, Rfl: 3    dapagliflozin Propanediol 10 MG tablet, Take 10 mg by mouth Daily., Disp: , Rfl:     Dulaglutide 4.5 MG/0.5ML solution auto-injector, Inject 4.5 mg under the skin into the appropriate area as directed 1 (One) Time Per Week., Disp: 2 mL, Rfl: 11    DULoxetine (CYMBALTA) 60 MG capsule, Take 1 capsule by mouth 2 (Two) Times a Day. Indications: Fibromyalgia Syndrome, Disp: 180 capsule, Rfl: 3    finasteride (PROSCAR) 5 MG tablet, Take 1 tablet by mouth Daily. Indications: Benign Enlargement of Prostate, Disp: 90 tablet, Rfl: 3    Glucagon (Baqsimi One Pack) 3 MG/DOSE powder, Administer 3 mg into the nostril(s) as directed by provider As Needed (hypoglycemia)., Disp: 1 each, Rfl: 1    glucose blood (Accu-Chek Guide Test) test strip, 1 each by Other route 4 (Four) Times a " Day Before Meals & at Bedtime. Dx: E11.65. Use as instructed, Disp: 150 each, Rfl: 2    Insulin Glargine (Lantus SoloStar) 100 UNIT/ML injection pen, Inject 70 Units under the skin into the appropriate area as directed Every Morning., Disp: , Rfl:     Insulin Glargine (Lantus SoloStar) 100 UNIT/ML injection pen, Inject 50 Units under the skin into the appropriate area as directed Every Evening., Disp: , Rfl:     losartan-hydrochlorothiazide (HYZAAR) 100-12.5 MG per tablet, Take 1 tablet by mouth Daily., Disp: , Rfl:     meloxicam (MOBIC) 15 MG tablet, Take 1 tablet by mouth Daily As Needed for Moderate Pain for up to 60 days., Disp: 30 tablet, Rfl: 1    metFORMIN ER (GLUCOPHAGE-XR) 500 MG 24 hr tablet, TAKE 2 TABLETS BY MOUTH TWICE DAILY, Disp: 180 tablet, Rfl: 0    naloxone (NARCAN) 4 MG/0.1ML nasal spray, Call 911. Don't prime. [place] in 1 nostril for overdose. Repeat in 2-3 minutes in other nostril if no or minimal breathing/responsiveness., Disp: 2 each, Rfl: 0    omeprazole (priLOSEC) 40 MG capsule, Take 1 capsule by mouth 2 (Two) Times a Day. Indications: Heartburn, Disp: 180 capsule, Rfl: 3    ondansetron ODT (ZOFRAN-ODT) 8 MG disintegrating tablet, Place 1 tablet on the tongue Every 8 (Eight) Hours As Needed for Nausea., Disp: 15 tablet, Rfl: 0    oxyCODONE (ROXICODONE) 10 MG tablet, Take 1 tablet by mouth 4 (Four) Times a Day As Needed for Severe Pain., Disp: 120 tablet, Rfl: 0    traZODone (DESYREL) 100 MG tablet, TAKE 2 TABLETS BY MOUTH EVERY EVENING, Disp: 180 tablet, Rfl: 1    celecoxib (CeleBREX) 200 MG capsule, Take 1 capsule by mouth Daily As Needed for Mild Pain for up to 60 days., Disp: 30 capsule, Rfl: 1    The following portions of the patient's history were reviewed and updated as appropriate: allergies, current medications, past family history, past medical history, past social history, past surgical history, and problem list.      REVIEW OF PERTINENT MEDICAL DATA    Past Medical History:    Diagnosis Date    Chronic back pain     Depression     Diabetes mellitus     Hypertension     Stroke      Past Surgical History:   Procedure Laterality Date    BONE SPUR LEG      CARPAL TUNNEL RELEASE Bilateral     RETINAL DETACHMENT REPAIR Bilateral     SINUS SURGERY      VERTEBROPLASTY N/A 3/21/2025    Procedure: Kyphoplasty L3 and L5 vertebral level;  Surgeon: Mane Jon DO;  Location: [facility] MAIN OR;  Service: Pain Management;  Laterality: N/A;     History reviewed. No pertinent family history.  Social History     Socioeconomic History    Marital status:    Tobacco Use    Smoking status: Former     Current packs/day: 0.00     Average packs/day: 2.0 packs/day for 18.0 years (36.0 ttl pk-yrs)     Types: Cigarettes     Start date:      Quit date:      Years since quittin.3     Passive exposure: Never    Smokeless tobacco: Never   Vaping Use    Vaping status: Never Used   Substance and Sexual Activity    Alcohol use: Yes    Drug use: Not Currently    Sexual activity: Defer         Review of Systems   Musculoskeletal:  Positive for arthralgias and back pain.         Vitals:    25 1528   BP: 155/87   Pulse: 90   Resp: 16   SpO2: 96%   Weight: (!) 138 kg (304 lb)   PainSc: 7            Objective   Physical Exam  Musculoskeletal:         General: Tenderness present.        Legs:            Imaging Reviewed:  MRI lumbar spine with and without contrast-3/17/2025  - Acute mild compression fracture of inferior endplate of L3 and superior endplate of L5 without significant osseous retropulsion.  Mild associated bone marrow edema and enhancement.  - Increased prominence of ventral epidural space from L3-L4 through the sacrum with both steer hyper and hypointense signal-suspect mostly combination of epidural fat and prominence of epidural venous plexus though a small epidural hematoma in the setting of trauma is not excluded  L1-2-moderate facet arthropathy  L2-3-moderate bilateral facet  arthropathy, ligamentum flavum thickening, small bilateral facet effusions  L3-4-severe bilateral facet arthropathy, ligamentum flavum thickening, moderate spinal canal stenosis.  Moderate to severe bilateral foraminal narrowing.  L4-5-severe bilateral facet arthropathy, ligamentum flavum thickening  L5-S1-mild spinal canal stenosis due to epidural process as above.  Moderate bilateral facet arthropathy.    Assessment:    1. Lumbar spondylosis    2. Lumbar radiculopathy    3. Sacroiliac joint dysfunction           Plan:   S/p kyphoplasty at L3 and L5 with significant improvement in pain over fractures.   I also discussed possibility of lumbar MRI to rule out any additional fractures but patient would like to hold off for now.  STOP meloxicam. Start Celebrex 200 mg daily PRN.   Start Tizanidine 4 mg TID PRN. Common side effects discussed with the patient including but not limited to dry mouth, drowsiness, dizziness, lightheadedness, constipation, weakness and tiredness.   He has failed gabapentin, Lyrica in past.   Patient has pain in the lower back,  b/l SI joint tenderness was positive. Laci test, SI joint compression and thigh thurst test were performed and were positive for SI joint pathology. Pain is below L5 vertebral level.  Main complaint is buttock pain. Discussed B/l SI joint injection under fluoro, Discussed the possibility of infection, bleeding, nerve damage, headache, increased pain, paraplegia. Patient understands and agrees.    RTC for b/l SI joint injection and then 4 weeks follow up.    INSPECT REPORT    As part of the patient's treatment plan, I may be prescribing controlled substances. The patient has been made aware of appropriate use of such medications, including potential risk of somnolence, limited ability to drive and/or work safely, and the potential for dependence or overdose. It has also been made clear that these medications are for use by this patient only, without concomitant use of  alcohol or other substances unless prescribed.     Patient has completed prescribing agreement detailing terms of continued prescribing of controlled substances, including monitoring INSPECT reports, urine drug screening, and pill counts if necessary. The patient is aware that inappropriate use will results in cessation of prescribing such medications.    INSPECT report has been reviewed and scanned into the patient's chart.

## 2025-05-02 NOTE — TELEPHONE ENCOUNTER
Wife states that they have checked BP during episodes, and it remains pretty good levels. He will stay off med for another week and let us know about dizziness.

## 2025-05-05 ENCOUNTER — OFFICE VISIT (OUTPATIENT)
Dept: PAIN MEDICINE | Facility: CLINIC | Age: 65
End: 2025-05-05
Payer: MEDICARE

## 2025-05-05 VITALS
HEART RATE: 90 BPM | WEIGHT: 304 LBS | RESPIRATION RATE: 16 BRPM | SYSTOLIC BLOOD PRESSURE: 155 MMHG | DIASTOLIC BLOOD PRESSURE: 87 MMHG | OXYGEN SATURATION: 96 % | BODY MASS INDEX: 43.62 KG/M2

## 2025-05-05 DIAGNOSIS — M47.816 LUMBAR SPONDYLOSIS: Primary | ICD-10-CM

## 2025-05-05 DIAGNOSIS — M53.3 SACROILIAC JOINT DYSFUNCTION: ICD-10-CM

## 2025-05-05 DIAGNOSIS — M54.16 LUMBAR RADICULOPATHY: ICD-10-CM

## 2025-05-05 PROCEDURE — 99214 OFFICE O/P EST MOD 30 MIN: CPT | Performed by: STUDENT IN AN ORGANIZED HEALTH CARE EDUCATION/TRAINING PROGRAM

## 2025-05-05 PROCEDURE — 1125F AMNT PAIN NOTED PAIN PRSNT: CPT | Performed by: STUDENT IN AN ORGANIZED HEALTH CARE EDUCATION/TRAINING PROGRAM

## 2025-05-05 PROCEDURE — 1159F MED LIST DOCD IN RCRD: CPT | Performed by: STUDENT IN AN ORGANIZED HEALTH CARE EDUCATION/TRAINING PROGRAM

## 2025-05-05 PROCEDURE — 3077F SYST BP >= 140 MM HG: CPT | Performed by: STUDENT IN AN ORGANIZED HEALTH CARE EDUCATION/TRAINING PROGRAM

## 2025-05-05 PROCEDURE — 1160F RVW MEDS BY RX/DR IN RCRD: CPT | Performed by: STUDENT IN AN ORGANIZED HEALTH CARE EDUCATION/TRAINING PROGRAM

## 2025-05-05 PROCEDURE — 3079F DIAST BP 80-89 MM HG: CPT | Performed by: STUDENT IN AN ORGANIZED HEALTH CARE EDUCATION/TRAINING PROGRAM

## 2025-05-05 RX ORDER — CELECOXIB 200 MG/1
200 CAPSULE ORAL DAILY PRN
Qty: 30 CAPSULE | Refills: 1 | Status: SHIPPED | OUTPATIENT
Start: 2025-05-05 | End: 2025-07-04

## 2025-05-07 LAB
CV ZIO BASELINE AVG BPM: 91 BPM
CV ZIO BASELINE BPM HIGH: 149 BPM
CV ZIO BASELINE BPM LOW: 66 BPM
CV ZIO DEVICE ANALYSIS TIME: NORMAL
CV ZIO ECT SVE COUNT: 2574 EPISODES
CV ZIO ECT SVE CPLT COUNT: 632 EPISODES
CV ZIO ECT SVE CPLT FREQ: NORMAL
CV ZIO ECT SVE FREQ: NORMAL
CV ZIO ECT SVE TPLT COUNT: 289 EPISODES
CV ZIO ECT SVE TPLT FREQ: NORMAL
CV ZIO ECT VE COUNT: 78 EPISODES
CV ZIO ECT VE CPLT COUNT: 0 EPISODES
CV ZIO ECT VE CPLT FREQ: 0
CV ZIO ECT VE FREQ: NORMAL
CV ZIO ECT VE TPLT COUNT: 0 EPISODES
CV ZIO ECT VE TPLT FREQ: 0
CV ZIO ECTOPIC SVE COUPLET RAW PERCENT: 0.07 %
CV ZIO ECTOPIC SVE ISOLATED PERCENT: 0.14 %
CV ZIO ECTOPIC SVE TRIPLET RAW PERCENT: 0.05 %
CV ZIO ECTOPIC VE COUPLET RAW PERCENT: 0 %
CV ZIO ECTOPIC VE ISOLATED PERCENT: 0 %
CV ZIO ECTOPIC VE TRIPLET RAW PERCENT: 0 %
CV ZIO ENROLLMENT END: NORMAL
CV ZIO ENROLLMENT START: NORMAL
CV ZIO PATIENT EVENTS DIARIES: 0
CV ZIO PATIENT EVENTS TRIGGERS: 27
CV ZIO PAUSE COUNT: 0
CV ZIO PRESCRIPTION STATUS: NORMAL
CV ZIO SVT AVG BPM: 122 BPM
CV ZIO SVT BPM HIGH: 136 BPM
CV ZIO SVT BPM LOW: 107 BPM
CV ZIO SVT COUNT: 2
CV ZIO SVT F EPI AVG BPM: 129 BPM
CV ZIO SVT F EPI BEATS: 6 BEATS
CV ZIO SVT F EPI BPM HIGH: 136 BPM
CV ZIO SVT F EPI BPM LOW: 124 BPM
CV ZIO SVT F EPI DUR: 2.8 SEC
CV ZIO SVT F EPI END: NORMAL
CV ZIO SVT F EPI START: NORMAL
CV ZIO SVT L EPI AVG BPM: 116 BPM
CV ZIO SVT L EPI BEATS: 9 BEATS
CV ZIO SVT L EPI BPM HIGH: 122 BPM
CV ZIO SVT L EPI BPM LOW: 107 BPM
CV ZIO SVT L EPI DUR: 4.7 SEC
CV ZIO SVT L EPI END: NORMAL
CV ZIO SVT L EPI START: NORMAL
CV ZIO TOTAL  ENROLLMENT PERIOD: NORMAL
CV ZIO VT COUNT: 0

## 2025-05-08 ENCOUNTER — TELEPHONE (OUTPATIENT)
Dept: FAMILY MEDICINE CLINIC | Facility: CLINIC | Age: 65
End: 2025-05-08

## 2025-05-08 NOTE — TELEPHONE ENCOUNTER
Caller:       CLYDE SPEARS () [phone] (Mobile)       Caller requesting test results:     What test was performed: HEART MONITOR RESULTS    When was the test performed: 4/14/2025 (10 hours)  [facility]?     Where was the test performed:     Additional notes: THEY ALSO RECEIVED A CALL TO SCHEDULE AN APPT / POSSIBLE REFERRAL ? BUT NOT SURE WHO THAT WAS / PLEASE ADVISE

## 2025-05-09 ENCOUNTER — EXTERNAL PBMM DATA (OUTPATIENT)
Dept: PHARMACY | Facility: OTHER | Age: 65
End: 2025-05-09
Payer: MEDICARE

## 2025-05-12 ENCOUNTER — TELEPHONE (OUTPATIENT)
Dept: FAMILY MEDICINE CLINIC | Facility: CLINIC | Age: 65
End: 2025-05-12
Payer: MEDICARE

## 2025-05-12 DIAGNOSIS — R55 SYNCOPE, UNSPECIFIED SYNCOPE TYPE: Primary | ICD-10-CM

## 2025-05-15 ENCOUNTER — HOSPITAL ENCOUNTER (OUTPATIENT)
Dept: PAIN MEDICINE | Facility: HOSPITAL | Age: 65
Discharge: HOME | End: 2025-05-15
Payer: MEDICARE

## 2025-05-15 VITALS
RESPIRATION RATE: 16 BRPM | HEART RATE: 85 BPM | SYSTOLIC BLOOD PRESSURE: 115 MMHG | DIASTOLIC BLOOD PRESSURE: 68 MMHG | WEIGHT: 304 LBS | OXYGEN SATURATION: 97 % | TEMPERATURE: 97.1 F | HEIGHT: 70 IN | BODY MASS INDEX: 43.52 KG/M2

## 2025-05-15 DIAGNOSIS — M53.3 SACROILIAC JOINT DYSFUNCTION: Primary | ICD-10-CM

## 2025-05-15 DIAGNOSIS — R52 PAIN: ICD-10-CM

## 2025-05-15 PROCEDURE — 25010000002 METHYLPREDNISOLONE PER 80 MG: Performed by: STUDENT IN AN ORGANIZED HEALTH CARE EDUCATION/TRAINING PROGRAM

## 2025-05-15 PROCEDURE — 77003 FLUOROGUIDE FOR SPINE INJECT: CPT

## 2025-05-15 PROCEDURE — 25010000002 LIDOCAINE PF 1% 1 % SOLUTION: Performed by: STUDENT IN AN ORGANIZED HEALTH CARE EDUCATION/TRAINING PROGRAM

## 2025-05-15 PROCEDURE — 25010000002 BUPIVACAINE (PF) 0.25 % SOLUTION: Performed by: STUDENT IN AN ORGANIZED HEALTH CARE EDUCATION/TRAINING PROGRAM

## 2025-05-15 RX ORDER — LIDOCAINE HYDROCHLORIDE 10 MG/ML
5 INJECTION, SOLUTION EPIDURAL; INFILTRATION; INTRACAUDAL; PERINEURAL ONCE
Status: COMPLETED | OUTPATIENT
Start: 2025-05-15 | End: 2025-05-15

## 2025-05-15 RX ORDER — METHYLPREDNISOLONE ACETATE 80 MG/ML
80 INJECTION, SUSPENSION INTRA-ARTICULAR; INTRALESIONAL; INTRAMUSCULAR; SOFT TISSUE ONCE
Status: COMPLETED | OUTPATIENT
Start: 2025-05-15 | End: 2025-05-15

## 2025-05-15 RX ORDER — BUPIVACAINE HYDROCHLORIDE 2.5 MG/ML
10 INJECTION, SOLUTION EPIDURAL; INFILTRATION; INTRACAUDAL; PERINEURAL ONCE
Status: COMPLETED | OUTPATIENT
Start: 2025-05-15 | End: 2025-05-15

## 2025-05-15 RX ADMIN — METHYLPREDNISOLONE ACETATE 80 MG: 80 INJECTION, SUSPENSION INTRA-ARTICULAR; INTRALESIONAL; INTRAMUSCULAR; SOFT TISSUE at 11:52

## 2025-05-15 RX ADMIN — BUPIVACAINE HYDROCHLORIDE 10 ML: 2.5 INJECTION, SOLUTION EPIDURAL; INFILTRATION; INTRACAUDAL; PERINEURAL at 11:52

## 2025-05-15 RX ADMIN — LIDOCAINE HYDROCHLORIDE 5 ML: 10 INJECTION, SOLUTION EPIDURAL; INFILTRATION; INTRACAUDAL; PERINEURAL at 11:47

## 2025-05-15 NOTE — PROCEDURES
PREOPERATIVE DIAGNOSIS:    B/l SI Joint Arthropathy      POSTOPERATIVE DIAGNOSIS:  Same.     PROCEDURE: b/l sacroiliac joint steroid injection     PROCEDURE IN DETAIL:  The patient was placed in a prone position and the lower back was prepped with chloraprep and draped in the usual sterile fashion.  The skin overlaying the left SI joint was infiltrated with 1% lidocaine for local anesthesia.  A 22-gauge 3.5 inch spinal needle was inserted through the skin under fluoroscopic guidance until we got to the lower third of the SI joint. Another needle was placed in chen upper third of the joint. 2 cc of 0.25% marcaine with depo-medrol was injected at each level. Same steps were repeated on right side. A total of 8 cc of 0.25% marcaine with 80 mg of depo-medrol was injected.     After the procedure the needles were flushed with preservative free local anesthetic and removed. Skin was cleaned and a sterile dressing was applied.    Following the procedure the patient's vital signs were stable. The patient was discharged home in good condition after being given discharge instructions.    COMPLICATIONS: None    Mane Jon DO  Pain Management   [facility]

## 2025-05-15 NOTE — H&P
H and P reviewed from previous visit and no changes to patient's clinical presentation. Will proceed with procedure as planned.       Mane Jon DO  Pain Management   [facility]

## 2025-05-16 ENCOUNTER — TELEPHONE (OUTPATIENT)
Dept: PAIN MEDICINE | Facility: HOSPITAL | Age: 65
End: 2025-05-16
Payer: MEDICARE

## 2025-05-28 RX ORDER — METFORMIN HYDROCHLORIDE 500 MG/1
1000 TABLET, EXTENDED RELEASE ORAL 2 TIMES DAILY
Qty: 180 TABLET | Refills: 0 | Status: SHIPPED | OUTPATIENT
Start: 2025-05-28

## 2025-05-29 DIAGNOSIS — S32.030D COMPRESSION FRACTURE OF L3 VERTEBRA WITH ROUTINE HEALING, SUBSEQUENT ENCOUNTER: ICD-10-CM

## 2025-05-29 RX ORDER — INSULIN GLARGINE 100 [IU]/ML
INJECTION, SOLUTION SUBCUTANEOUS
Qty: 30 ML | Refills: 3 | Status: SHIPPED | OUTPATIENT
Start: 2025-05-29

## 2025-05-29 NOTE — TELEPHONE ENCOUNTER
Caller: CLYDE SPEARS    Relationship: Emergency Contact    Best call back number: [phone]    Requested Prescriptions:   Requested Prescriptions     Pending Prescriptions Disp Refills    oxyCODONE (ROXICODONE) 10 MG tablet 120 tablet 0     Sig: Take 1 tablet by mouth 4 (Four) Times a Day As Needed for Severe Pain.        Pharmacy where request should be sent: [email or web address] [email or web address] [address] [facility] [phone] [facility] [phone]      Last office visit with prescribing clinician: 4/25/2025   Last telemedicine visit with prescribing clinician: Visit date not found   Next office visit with prescribing clinician: 7/25/2025     Does the patient have less than a 3 day supply:  [x] Yes  [] No    Would you like a call back once the refill request has been completed: [] Yes [x] No    If the office needs to give you a call back, can they leave a voicemail: [] Yes [x] No    Hermelindo Lock Rep   05/29/25 13:59 EDT

## 2025-05-30 RX ORDER — OXYCODONE HYDROCHLORIDE 10 MG/1
10 TABLET ORAL 4 TIMES DAILY PRN
Qty: 120 TABLET | Refills: 0 | Status: SHIPPED | OUTPATIENT
Start: 2025-05-30

## 2025-06-02 ENCOUNTER — TELEPHONE (OUTPATIENT)
Dept: CASE MANAGEMENT | Facility: CLINIC | Age: 65
End: 2025-06-02
Payer: MEDICARE

## 2025-06-02 ENCOUNTER — PATIENT OUTREACH (OUTPATIENT)
Dept: CASE MANAGEMENT | Facility: CLINIC | Age: 65
End: 2025-06-02
Payer: MEDICARE

## 2025-06-02 DIAGNOSIS — E11.65 UNCONTROLLED DIABETES MELLITUS WITH HYPERGLYCEMIA, WITH LONG-TERM CURRENT USE OF INSULIN: ICD-10-CM

## 2025-06-02 DIAGNOSIS — Z79.4 UNCONTROLLED DIABETES MELLITUS WITH HYPERGLYCEMIA, WITH LONG-TERM CURRENT USE OF INSULIN: ICD-10-CM

## 2025-06-02 DIAGNOSIS — Z79.4 TYPE 2 DIABETES MELLITUS WITH DIABETIC POLYNEUROPATHY, WITH LONG-TERM CURRENT USE OF INSULIN: Primary | ICD-10-CM

## 2025-06-02 DIAGNOSIS — E11.42 TYPE 2 DIABETES MELLITUS WITH DIABETIC POLYNEUROPATHY, WITH LONG-TERM CURRENT USE OF INSULIN: Primary | ICD-10-CM

## 2025-06-02 NOTE — OUTREACH NOTE
AMBULATORY CASE MANAGEMENT NOTE    Names and Relationships of Patient/Support Persons: Contact: Matthieu Hawk; Relationship: Self  Contact: [facility] [place] [facility] [place] Neelima; Relationship:  -     Patient Outreach    Spoke with patient at this time for ACO proactive outreach, identified self and role.  Discussed DM management, check on status of endocrinology appt.  Patient states he has not been able to schedule appt with endocrinology as they have not returned his call.  He is agreeable to [facility] scheduling appt for him, agreeable to any time of day or day of the week.    Discussed diabetes diet, patient states he tries to be aware of his carb and sugar intake and is just not sure what he is doing wrong.  Reviewed DM diet, patient is agreeable to [facility] mailing DM diet information to his home.  He is also agreeable to a referral to the diabetes educator. He declines further assistance with DM management other than what [facility] is assisting with at this time.    Care Coordination    Spoke with Neelima at [facility], new patient appt made.  Next available appt not until 9/9/25 at 9:20AM.  Neelima will also place patient on the wait list for a sooner appt if one becomes available.    Patient Outreach    Attempted to contact patient to explain the above, no answer, left message with information.    PLAN:  Answer from PCP re: Diabetes Educator Referral  Questions re: dietary info?    Send Education  Questions/Answers      Flowsheet Row Most Recent Value   Annual Wellness Visit:  Patient Has Completed   Other Patient Education/Resources  Nutrition/Diet   Nutrition/Diet Education Method Verbal  [Mailed]            Education Documentation  Sweeteners, taught by Shelia Duncan, RN at 6/2/2025  3:54 PM.  Learner: Patient  Readiness: Acceptance  Method: Explanation, Handout  Response: Verbalizes Understanding, Needs Reinforcement    Portion Management, taught by Shelia Duncan, RN at 6/2/2025  3:54 PM.  Learner: Patient  Readiness: Acceptance  Method:  Explanation, Handout  Response: Verbalizes Understanding, Needs Reinforcement    Consistent Eating Pattern, taught by Shelia Duncan RN at 6/2/2025  3:54 PM.  Learner: Patient  Readiness: Acceptance  Method: Explanation, Handout  Response: Verbalizes Understanding, Needs Reinforcement    Carbohydrate Counting, taught by Shelia Duncan RN at 6/2/2025  3:54 PM.  Learner: Patient  Readiness: Acceptance  Method: Explanation, Handout  Response: Verbalizes Understanding, Needs Reinforcement    Monitoring Carbohydrate Intake, taught by Shelia Duncan RN at 6/2/2025  3:54 PM.  Learner: Patient  Readiness: Acceptance  Method: Explanation, Handout  Response: Verbalizes Understanding, Needs Reinforcement    Healthy Food Choices, taught by Shelia Duncan RN at 6/2/2025  3:54 PM.  Learner: Patient  Readiness: Acceptance  Method: Explanation, Handout  Response: Verbalizes Understanding, Needs Reinforcement    Carbohydrate-Containing Foods, taught by Shelia Duncan RN at 6/2/2025  3:54 PM.  Learner: Patient  Readiness: Acceptance  Method: Explanation, Handout  Response: Verbalizes Understanding, Needs Reinforcement          Shelia SABA  Ambulatory Case Management    6/2/2025, 16:02 EDT

## 2025-06-02 NOTE — TELEPHONE ENCOUNTER
Patient scheduled with endocrinology in September. He is interested in referral to diabetes educator if PCP is agreeable.  See patient outreach from today for more information.

## 2025-06-03 ENCOUNTER — TELEPHONE (OUTPATIENT)
Dept: CASE MANAGEMENT | Facility: CLINIC | Age: 65
End: 2025-06-03
Payer: MEDICARE

## 2025-06-09 ENCOUNTER — PATIENT OUTREACH (OUTPATIENT)
Dept: CASE MANAGEMENT | Facility: CLINIC | Age: 65
End: 2025-06-09
Payer: MEDICARE

## 2025-06-09 ENCOUNTER — OFFICE VISIT (OUTPATIENT)
Dept: PAIN MEDICINE | Facility: CLINIC | Age: 65
End: 2025-06-09
Payer: MEDICARE

## 2025-06-09 VITALS
SYSTOLIC BLOOD PRESSURE: 152 MMHG | BODY MASS INDEX: 43.19 KG/M2 | WEIGHT: 301 LBS | OXYGEN SATURATION: 95 % | HEART RATE: 102 BPM | DIASTOLIC BLOOD PRESSURE: 89 MMHG | RESPIRATION RATE: 16 BRPM

## 2025-06-09 DIAGNOSIS — E13.42 DIABETIC POLYNEUROPATHY ASSOCIATED WITH OTHER SPECIFIED DIABETES MELLITUS: ICD-10-CM

## 2025-06-09 DIAGNOSIS — M53.3 SACROILIAC JOINT DYSFUNCTION: ICD-10-CM

## 2025-06-09 DIAGNOSIS — M47.816 LUMBAR SPONDYLOSIS: ICD-10-CM

## 2025-06-09 DIAGNOSIS — M53.3 COCCYDYNIA: Primary | ICD-10-CM

## 2025-06-09 DIAGNOSIS — M54.16 LUMBAR RADICULOPATHY: ICD-10-CM

## 2025-06-09 RX ORDER — LIDOCAINE 50 MG/G
2 PATCH TOPICAL EVERY 24 HOURS
Qty: 60 PATCH | Refills: 2 | Status: SHIPPED | OUTPATIENT
Start: 2025-06-09

## 2025-06-09 NOTE — PROGRESS NOTES
Subjective   Matthieu Hawk is a 64 y.o. male is here for follow up for lower back pain.  Last seen for b/l SI joint injections with significant improvement in pain. His main complaint is tail bone pain that prevents him from siting for long time.     On last visit: started celebrex and tizanidine - didn't help     Tail bone pain is 7/10 on VAS, at maximum is 8/10. Pain is sharp, shooting, and stabbing in nature. Pain is referred intermittently to right posterior leg. The pain is constant. The pain is improved by nothing. The pain is worse with walking, standing. His main complaint is b/l buttock pain.     Previous Injection:   5/15/25 - B/l SI joint injection -  70% pain relief with functional improvement.   3/18/2025-kyphoplasty L3 and L5- good relief for few days.       Hx: presented to [facility] after falling out of utility trailer on 3/13/2025.  He immediately had increased lower back pain and unfortunately worsened over the next couple days leading to ER on 3/17/2024.  Due to severe pain, patient was unable to walk and has been on escalating doses of pain medications while being inpatient and still unable to move significantly due to pain.  He denies any bowel or bladder incontinence or saddle anesthesia.  MRI lumbar spine was done showing acute compression fracture of L3 and L5 vertebral levels.  Evaluated by neurosurgery and not a surgical candidate.  Unable to tolerate TLSO due to severe pain.  He had been previously seen by outside pain management and had multiple epidurals and MBB's without significant pain relief.                  PMH:   Morbid obesity, DM-2, depression, CVA     Current Medications:   Norco  Cymbalta       Previous medications:   Flexeril  Gabapentin  Lyrica  Baclofen        PEG Assessment   What number best describes your pain on average in the past week?10  What number best describes how, during the past week, pain has interfered with your enjoyment of life?10  What number best  "describes how, during the past week, pain has interfered with your general activity?  10       Current Outpatient Medications:     Accu-Chek Softclix Lancets lancets, 1 each by Other route 4 (Four) Times a Day Before Meals & at Bedtime. Dx: E11.65. Use as instructed, Disp: 200 each, Rfl: 2    amLODIPine (NORVASC) 10 MG tablet, Take 1 tablet by mouth Daily. Indications: High Blood Pressure Disorder, Disp: 90 tablet, Rfl: 3    aspirin 81 MG chewable tablet, Chew 1 tablet Daily. Indications: Disease involving Lipid Deposits in the Arteries, Disp: 90 tablet, Rfl: 3    atorvastatin (LIPITOR) 20 MG tablet, Take 1 tablet by mouth Daily. Indications: High Amount of Fats in the Blood, Disp: 90 tablet, Rfl: 3    B-D UF III MINI PEN NEEDLES 31G X 5 MM misc, Inject 1 Needle under the skin into the appropriate area as directed 2 (Two) Times a Day. use as directed, Disp: 60 each, Rfl: 3    BD Insulin Syringe U/F 31G X 5/16\" 1 ML misc, Inject 1 syringe under the skin into the appropriate area as directed Daily., Disp: 60 each, Rfl: 1    carvedilol (COREG) 12.5 MG tablet, Take 1 tablet by mouth 2 (Two) Times a Day With Meals. Indications: High Blood Pressure Disorder, Disp: 180 tablet, Rfl: 3    celecoxib (CeleBREX) 200 MG capsule, Take 1 capsule by mouth Daily As Needed for Mild Pain for up to 60 days., Disp: 30 capsule, Rfl: 1    Continuous Glucose Sensor (FreeStyle Mason 2 Sensor) misc, apply 1 unit TO SKIN AND replace every 14 DAYS TO check blood pressure, Disp: 4 each, Rfl: 3    dapagliflozin Propanediol 10 MG tablet, Take 10 mg by mouth Daily., Disp: , Rfl:     Dulaglutide 4.5 MG/0.5ML solution auto-injector, Inject 4.5 mg under the skin into the appropriate area as directed 1 (One) Time Per Week., Disp: 2 mL, Rfl: 11    DULoxetine (CYMBALTA) 60 MG capsule, Take 1 capsule by mouth 2 (Two) Times a Day. Indications: Fibromyalgia Syndrome, Disp: 180 capsule, Rfl: 3    finasteride (PROSCAR) 5 MG tablet, Take 1 tablet by mouth " Daily. Indications: Benign Enlargement of Prostate, Disp: 90 tablet, Rfl: 3    Glucagon (Baqsimi One Pack) 3 MG/DOSE powder, Administer 3 mg into the nostril(s) as directed by provider As Needed (hypoglycemia)., Disp: 1 each, Rfl: 1    glucose blood (Accu-Chek Guide Test) test strip, 1 each by Other route 4 (Four) Times a Day Before Meals & at Bedtime. Dx: E11.65. Use as instructed, Disp: 150 each, Rfl: 2    Insulin Glargine (Lantus SoloStar) 100 UNIT/ML injection pen, Inject 70 Units under the skin into the appropriate area as directed Every Morning., Disp: , Rfl:     Insulin Glargine (Lantus SoloStar) 100 UNIT/ML injection pen, Inject 50 Units under the skin into the appropriate area as directed Every Evening., Disp: , Rfl:     Insulin Glargine (Lantus SoloStar) 100 UNIT/ML injection pen, Inject 70 units subcutaneously each morning. Inject 60 units subcutaneously each evening. Indications: Type 2 Diabetes, Disp: 30 mL, Rfl: 3    losartan-hydrochlorothiazide (HYZAAR) 100-12.5 MG per tablet, Take 1 tablet by mouth Daily., Disp: , Rfl:     metFORMIN ER (GLUCOPHAGE-XR) 500 MG 24 hr tablet, TAKE 2 TABLETS BY MOUTH TWICE DAILY, Disp: 180 tablet, Rfl: 0    naloxone (NARCAN) 4 MG/0.1ML nasal spray, Call 911. Don't prime. [place] in 1 nostril for overdose. Repeat in 2-3 minutes in other nostril if no or minimal breathing/responsiveness., Disp: 2 each, Rfl: 0    omeprazole (priLOSEC) 40 MG capsule, Take 1 capsule by mouth 2 (Two) Times a Day. Indications: Heartburn, Disp: 180 capsule, Rfl: 3    ondansetron ODT (ZOFRAN-ODT) 8 MG disintegrating tablet, Place 1 tablet on the tongue Every 8 (Eight) Hours As Needed for Nausea., Disp: 15 tablet, Rfl: 0    oxyCODONE (ROXICODONE) 10 MG tablet, Take 1 tablet by mouth 4 (Four) Times a Day As Needed for Severe Pain., Disp: 120 tablet, Rfl: 0    traZODone (DESYREL) 100 MG tablet, TAKE 2 TABLETS BY MOUTH EVERY EVENING, Disp: 180 tablet, Rfl: 1    lidocaine (LIDODERM) 5 %, Place 2 patches  on the skin as directed by provider Daily. Remove & Discard patch within 12 hours or as directed by MD, Disp: 60 patch, Rfl: 2    The following portions of the patient's history were reviewed and updated as appropriate: allergies, current medications, past family history, past medical history, past social history, past surgical history, and problem list.      REVIEW OF PERTINENT MEDICAL DATA    Past Medical History:   Diagnosis Date    Chronic back pain     Depression     Diabetes mellitus     Hypertension     Stroke      Past Surgical History:   Procedure Laterality Date    BONE SPUR LEG      CARPAL TUNNEL RELEASE Bilateral     RETINAL DETACHMENT REPAIR Bilateral     SINUS SURGERY      VERTEBROPLASTY N/A 3/21/2025    Procedure: Kyphoplasty L3 and L5 vertebral level;  Surgeon: Mane Jon DO;  Location: [facility] MAIN OR;  Service: Pain Management;  Laterality: N/A;     History reviewed. No pertinent family history.  Social History     Socioeconomic History    Marital status:    Tobacco Use    Smoking status: Former     Current packs/day: 0.00     Average packs/day: 2.0 packs/day for 18.0 years (36.0 ttl pk-yrs)     Types: Cigarettes     Start date:      Quit date:      Years since quittin.4     Passive exposure: Never    Smokeless tobacco: Never   Vaping Use    Vaping status: Never Used   Substance and Sexual Activity    Alcohol use: Yes    Drug use: Not Currently    Sexual activity: Defer         Review of Systems   Musculoskeletal:  Positive for arthralgias and back pain.         Vitals:    25 1353   BP: 152/89   Pulse: 102   Resp: 16   SpO2: 95%   Weight: (!) 137 kg (301 lb)   PainSc: 7              Objective   Physical Exam  Musculoskeletal:         General: Tenderness present.        Legs:            Imaging Reviewed:  MRI lumbar spine with and without contrast-3/17/2025  - Acute mild compression fracture of inferior endplate of L3 and superior endplate of L5 without significant  osseous retropulsion.  Mild associated bone marrow edema and enhancement.  - Increased prominence of ventral epidural space from L3-L4 through the sacrum with both steer hyper and hypointense signal-suspect mostly combination of epidural fat and prominence of epidural venous plexus though a small epidural hematoma in the setting of trauma is not excluded  L1-2-moderate facet arthropathy  L2-3-moderate bilateral facet arthropathy, ligamentum flavum thickening, small bilateral facet effusions  L3-4-severe bilateral facet arthropathy, ligamentum flavum thickening, moderate spinal canal stenosis.  Moderate to severe bilateral foraminal narrowing.  L4-5-severe bilateral facet arthropathy, ligamentum flavum thickening  L5-S1-mild spinal canal stenosis due to epidural process as above.  Moderate bilateral facet arthropathy.    Assessment:    1. Coccydynia    2. Lumbar spondylosis    3. Sacroiliac joint dysfunction    4. Lumbar radiculopathy    5. Diabetic polyneuropathy associated with other specified diabetes mellitus             Plan:   S/p kyphoplasty at L3 and L5 with significant improvement in pain over fractures.   I also discussed possibility of lumbar MRI to rule out any additional fractures but patient would like to hold off for now.  He has failed gabapentin, Lyrica, celebrex, meloxicam, tizanidine in past.   Patient has pain in the lower back with radicular pain in the leg and tail bone pain, patient has failed conservative therapy including PT and pharmacological management for more than 6 weeks and pain interferes with activities of daily living. MRI shows canal narrowing at L5-S1, L3-4. Discussed caudal JENNY, Discussed the possibility of infection, bleeding, nerve damage, post dural puncture headache, increased pain, paraplegia. Patient understands and agrees.     RTC for caudal JENNY.     INSPECT REPORT    As part of the patient's treatment plan, I may be prescribing controlled substances. The patient has been  made aware of appropriate use of such medications, including potential risk of somnolence, limited ability to drive and/or work safely, and the potential for dependence or overdose. It has also been made clear that these medications are for use by this patient only, without concomitant use of alcohol or other substances unless prescribed.     Patient has completed prescribing agreement detailing terms of continued prescribing of controlled substances, including monitoring INSPECT reports, urine drug screening, and pill counts if necessary. The patient is aware that inappropriate use will results in cessation of prescribing such medications.    INSPECT report has been reviewed and scanned into the patient's chart.

## 2025-06-09 NOTE — OUTREACH NOTE
AMBULATORY CASE MANAGEMENT NOTE    Names and Relationships of Patient/Support Persons: Contact: SHARRI HAWK; Relationship: Emergency Contact  Contact: Matthieu Hawk; Relationship: Self -     Patient Outreach    Spoke with patient's wife Sharri at this time.  Verified Sharri is on patient's verbal release on file.  Explained that ACM had left a VM on patient's phone with the information for the Endocrinologist appointment, wife is requesting [facility] mail appointment reminder to patient's home.  She also states they did not receive the dietary information AC had mailed to their home last week.  [facility] will mail this information again.  Wife verified she has heard from diabetic educator and was told the next class would be in August.  Wife denies any other needs at this time, will call [facility] with any questions regarding diabetes diet.    Education Documentation  Sweeteners, taught by Shelia Duncan RN at 6/9/2025  2:44 PM.  Learner: Patient  Readiness: Acceptance  Method: Handout  Response: No Evidence of Learning  Comment: Mailed to home    Portion Management, taught by Shelia Duncan RN at 6/9/2025  2:44 PM.  Learner: Patient  Readiness: Acceptance  Method: Handout  Response: No Evidence of Learning  Comment: Mailed to home    Consistent Eating Pattern, taught by Shelia Duncan RN at 6/9/2025  2:44 PM.  Learner: Patient  Readiness: Acceptance  Method: Handout  Response: No Evidence of Learning  Comment: Mailed to home    Carbohydrate Counting, taught by Shelia Duncan RN at 6/9/2025  2:44 PM.  Learner: Patient  Readiness: Acceptance  Method: Handout  Response: No Evidence of Learning  Comment: Mailed to home    Monitoring Carbohydrate Intake, taught by Shelia Duncan RN at 6/9/2025  2:44 PM.  Learner: Patient  Readiness: Acceptance  Method: Handout  Response: No Evidence of Learning  Comment: Mailed to home    Healthy Food Choices, taught by Shelia Duncan RN at 6/9/2025  2:44 PM.  Learner: Patient  Readiness:  Acceptance  Method: Handout  Response: No Evidence of Learning  Comment: Mailed to home    Carbohydrate-Containing Foods, taught by Shelia Duncan, RN at 6/9/2025  2:44 PM.  Learner: Patient  Readiness: Acceptance  Method: Handout  Response: No Evidence of Learning  Comment: Mailed to home          Shelia SABA  Ambulatory Case Management    6/9/2025, 14:52 EDT

## 2025-06-18 ENCOUNTER — OFFICE VISIT (OUTPATIENT)
Dept: CARDIOLOGY | Facility: CLINIC | Age: 65
End: 2025-06-18
Payer: MEDICARE

## 2025-06-18 VITALS
HEIGHT: 70 IN | OXYGEN SATURATION: 95 % | DIASTOLIC BLOOD PRESSURE: 83 MMHG | HEART RATE: 98 BPM | SYSTOLIC BLOOD PRESSURE: 127 MMHG | WEIGHT: 296 LBS | BODY MASS INDEX: 42.37 KG/M2 | RESPIRATION RATE: 18 BRPM

## 2025-06-18 DIAGNOSIS — E66.01 OBESITY, MORBID: ICD-10-CM

## 2025-06-18 DIAGNOSIS — I10 ESSENTIAL (PRIMARY) HYPERTENSION: ICD-10-CM

## 2025-06-18 DIAGNOSIS — E78.2 MIXED HYPERLIPIDEMIA: ICD-10-CM

## 2025-06-18 DIAGNOSIS — Z86.73 HISTORY OF STROKE: Primary | ICD-10-CM

## 2025-06-18 RX ORDER — AMLODIPINE BESYLATE 5 MG/1
5 TABLET ORAL DAILY
Qty: 90 TABLET | Refills: 3 | Status: SHIPPED | OUTPATIENT
Start: 2025-06-18

## 2025-06-19 ENCOUNTER — TELEPHONE (OUTPATIENT)
Dept: FAMILY MEDICINE CLINIC | Facility: CLINIC | Age: 65
End: 2025-06-19

## 2025-06-19 NOTE — TELEPHONE ENCOUNTER
Caller: CLYDE SPEARS    Relationship: Emergency Contact    Best call back number:       What is the best time to reach you: ANYTIME    Who are you requesting to speak with (clinical staff, provider,  specific staff member): DR ENGLE    Do you know the name of the person who called: CLYDE    What was the call regarding: PATIENT WIFE STATED THE PATIENT HAD AN APPOINTMENT YESTERDAY 06/18/25 WITH  CARDIOLOGY AND EXPLAINED THEY ARE BOTH UNHAPPY WITH THERE DOCTOR AND HOW EVERYTHING WAS HANDLED.     PATIENTS WIFE STATED THE RESULTS OF THE HEART MONITOR TEST WAS NOT EXPLAINED AND IS REQUESTING TO SPEAK TO KADIE ABOUT THE RESULTS AND THE VISIT.       PLEASE CALL TO DISCUSS AND ADVISE

## 2025-06-24 RX ORDER — METFORMIN HYDROCHLORIDE 500 MG/1
1000 TABLET, EXTENDED RELEASE ORAL 2 TIMES DAILY
Qty: 180 TABLET | Refills: 0 | Status: SHIPPED | OUTPATIENT
Start: 2025-06-24

## 2025-06-25 RX ORDER — ONDANSETRON 8 MG/1
8 TABLET, ORALLY DISINTEGRATING ORAL EVERY 8 HOURS PRN
Qty: 15 TABLET | Refills: 0 | Status: SHIPPED | OUTPATIENT
Start: 2025-06-25

## 2025-06-25 NOTE — PROGRESS NOTES
"Cardiology Clinic Note  Chidi Romero MD, PhD    Subjective:     Encounter Date:06/18/2025      Patient ID: Matthieu Hawk is a 64 y.o. male.    Chief Complaint:  Chief Complaint   Patient presents with    Loss of Consciousness       HPI:  I had the pleasure to see this 64-year-old with a history of stroke last year in June 2024, hypertension hyperlipidemia diabetes, 5 family history of bypass and MI in his father, former smoker quit remotely 1994, had a syncopal event.  He is overweight and obese at 296 pounds.  Symptoms consistent with dysautonomia and orthostasis.  We discussed decreasing his calcium channel blocker, he is also on some HCTZ but at a low dose, he does not appear significantly volume depleted.  He denies any new chest pain or shortness of breath      Prior cardiac event monitor sinus rhythm throughout with rare PACs and PVCs, paroxysmal atrial tachycardia which was self-limited and self terminating as a benign finding and noncontributory    Echo 2024, EF 65 to 70% with grade 1 diastolic dysfunction with mild left atrial enlargement, limited secondary to body habitus, trace to mild valvular insufficiency is able to be seen in available windows    Historical data copied forward from previous encounters in EMR including the history, exam, and assessment/plan has been reviewed and is unchanged unless noted otherwise.    Cardiac medicines reviewed with risk, benefits, and necessity of each discussed.    Risk and benefit of cardiac testing reviewed including death heart attack stroke pain bleeding infection need for vascular /cardiovascular surgery were discussed and the patient     Objective:         /83 (BP Location: Right arm, Patient Position: Sitting)   Pulse 98   Resp 18   Ht 177.8 cm (70\")   Wt 134 kg (296 lb)   SpO2 95%   BMI 42.47 kg/m²     Physical Exam  Regular, 80s to 90s, no rubs gallops heave or lift, distant heart sounds with body habitus  Obese soft nontender " nondistended  Skin is warm and dry  No carotid bruits or JVD  Intact grossly  Normal radial pulses normal cap refill  Assessment:       Syncopal event  Diabetes  Hypertension  Hyperlipidemia  Stroke June 2024  Family history of CAD  Former smoker    Event possibly consistent with dysautonomia and orthostasis by description history    Decrease amlodipine to 5 daily  Home blood pressure log to twice daily  Tilt table testing if indicated  Avoid volume depletion  Preserved EF by testing historically  Angina free no evidence for any ischemic evaluation at this time, consider coronary calcium scoring as an outpatient  Continue beta-blocker, lipid-lowering therapy  Diet and exercise weight loss as allowed by functional status  Follow-up primary care in the interim    3-month follow-up for results and clinical course    Chidi Romero MD, PhD        The pleasure to be involved in this patient's cardiovascular care.  Please call with any questions or concerns  Chidi Romero MD, PhD    Most recent EKG as reviewed and interpreted by me:  Procedures     Most recent echo as reviewed and interpreted by me:  Results for orders placed during the hospital encounter of 06/14/24    Adult Transthoracic Echo Complete W/ Cont if Necessary Per Protocol (With Agitated Saline)    Interpretation Summary    Left ventricular systolic function is normal. Left ventricular ejection fraction appears to be 66 - 70%.    Left ventricular diastolic function is consistent with (grade I) impaired relaxation.    The left atrial cavity is mildly dilated.    The study is technically difficult for diagnosis. The quality of the study is limited due to patient body habituswith poor acoustic windows      Most recent stress test as reviewed and interpreted by me:      Most recent cardiac catheterization as reviewed interpreted by me:  No results found for this or any previous visit.    The following portions of the patient's history were reviewed and updated  as appropriate: allergies, current medications, past family history, past medical history, past social history, past surgical history, and problem list.      ROS:  14 point review of systems negative except as mentioned above    Current Outpatient Medications:     aspirin 81 MG chewable tablet, Chew 1 tablet Daily. Indications: Disease involving Lipid Deposits in the Arteries, Disp: 90 tablet, Rfl: 3    atorvastatin (LIPITOR) 20 MG tablet, Take 1 tablet by mouth Daily. Indications: High Amount of Fats in the Blood, Disp: 90 tablet, Rfl: 3    carvedilol (COREG) 12.5 MG tablet, Take 1 tablet by mouth 2 (Two) Times a Day With Meals. Indications: High Blood Pressure Disorder, Disp: 180 tablet, Rfl: 3    dapagliflozin Propanediol 10 MG tablet, Take 10 mg by mouth Daily., Disp: , Rfl:     Dulaglutide 4.5 MG/0.5ML solution auto-injector, Inject 4.5 mg under the skin into the appropriate area as directed 1 (One) Time Per Week., Disp: 2 mL, Rfl: 11    DULoxetine (CYMBALTA) 60 MG capsule, Take 1 capsule by mouth 2 (Two) Times a Day. Indications: Fibromyalgia Syndrome, Disp: 180 capsule, Rfl: 3    finasteride (PROSCAR) 5 MG tablet, Take 1 tablet by mouth Daily. Indications: Benign Enlargement of Prostate, Disp: 90 tablet, Rfl: 3    Insulin Glargine (Lantus SoloStar) 100 UNIT/ML injection pen, Inject 70 Units under the skin into the appropriate area as directed Every Morning., Disp: , Rfl:     Insulin Glargine (Lantus SoloStar) 100 UNIT/ML injection pen, Inject 50 Units under the skin into the appropriate area as directed Every Evening., Disp: , Rfl:     Insulin Glargine (Lantus SoloStar) 100 UNIT/ML injection pen, Inject 70 units subcutaneously each morning. Inject 60 units subcutaneously each evening. Indications: Type 2 Diabetes, Disp: 30 mL, Rfl: 3    lidocaine (LIDODERM) 5 %, Place 2 patches on the skin as directed by provider Daily. Remove & Discard patch within 12 hours or as directed by MD, Disp: 60 patch, Rfl: 2     "losartan-hydrochlorothiazide (HYZAAR) 100-12.5 MG per tablet, Take 1 tablet by mouth Daily., Disp: , Rfl:     naloxone (NARCAN) 4 MG/0.1ML nasal spray, Call 911. Don't prime. [place] in 1 nostril for overdose. Repeat in 2-3 minutes in other nostril if no or minimal breathing/responsiveness., Disp: 2 each, Rfl: 0    omeprazole (priLOSEC) 40 MG capsule, Take 1 capsule by mouth 2 (Two) Times a Day. Indications: Heartburn, Disp: 180 capsule, Rfl: 3    ondansetron ODT (ZOFRAN-ODT) 8 MG disintegrating tablet, Place 1 tablet on the tongue Every 8 (Eight) Hours As Needed for Nausea., Disp: 15 tablet, Rfl: 0    oxyCODONE (ROXICODONE) 10 MG tablet, Take 1 tablet by mouth 4 (Four) Times a Day As Needed for Severe Pain., Disp: 120 tablet, Rfl: 0    traZODone (DESYREL) 100 MG tablet, TAKE 2 TABLETS BY MOUTH EVERY EVENING, Disp: 180 tablet, Rfl: 1    Accu-Chek Softclix Lancets lancets, 1 each by Other route 4 (Four) Times a Day Before Meals & at Bedtime. Dx: E11.65. Use as instructed, Disp: 200 each, Rfl: 2    amLODIPine (NORVASC) 5 MG tablet, Take 1 tablet by mouth Daily., Disp: 90 tablet, Rfl: 3    B-D UF III MINI PEN NEEDLES 31G X 5 MM misc, Inject 1 Needle under the skin into the appropriate area as directed 2 (Two) Times a Day. use as directed, Disp: 60 each, Rfl: 3    BD Insulin Syringe U/F 31G X 5/16\" 1 ML misc, Inject 1 syringe under the skin into the appropriate area as directed Daily., Disp: 60 each, Rfl: 1    Continuous Glucose Sensor (FreeStyle Mason 2 Sensor) misc, apply 1 unit TO SKIN AND replace every 14 DAYS TO check blood pressure, Disp: 4 each, Rfl: 3    Glucagon (Baqsimi One Pack) 3 MG/DOSE powder, Administer 3 mg into the nostril(s) as directed by provider As Needed (hypoglycemia)., Disp: 1 each, Rfl: 1    glucose blood (Accu-Chek Guide Test) test strip, 1 each by Other route 4 (Four) Times a Day Before Meals & at Bedtime. Dx: E11.65. Use as instructed, Disp: 150 each, Rfl: 2    metFORMIN ER (GLUCOPHAGE-XR) 500 " MG 24 hr tablet, TAKE 2 TABLETS BY MOUTH TWICE DAILY, Disp: 180 tablet, Rfl: 0    Problem List:  Patient Active Problem List   Diagnosis    Chronic pain disorder    Degenerative disc disease, lumbar    Degenerative disc disease, thoracic    Depressive disorder    Essential (primary) hypertension    Gastroesophageal reflux disease without esophagitis    Insulin long-term use    Long term prescription opiate use    Mixed hyperlipidemia    Moderate persistent asthma without complication    Obesity, morbid    Type 2 diabetes mellitus with diabetic polyneuropathy, with long-term current use of insulin    History of stroke    Uncontrolled diabetes mellitus with hyperglycemia, with long-term current use of insulin    Lumbar compression fracture     Past Medical History:  Past Medical History:   Diagnosis Date    Chronic back pain     Depression     Diabetes mellitus     Hypertension     Stroke      Past Surgical History:  Past Surgical History:   Procedure Laterality Date    BONE SPUR LEG      CARPAL TUNNEL RELEASE Bilateral     RETINAL DETACHMENT REPAIR Bilateral     SINUS SURGERY      VERTEBROPLASTY N/A 3/21/2025    Procedure: Kyphoplasty L3 and L5 vertebral level;  Surgeon: Mane Jon DO;  Location: [facility] MAIN OR;  Service: Pain Management;  Laterality: N/A;     Social History:  Social History     Socioeconomic History    Marital status:    Tobacco Use    Smoking status: Former     Current packs/day: 0.00     Average packs/day: 2.0 packs/day for 18.0 years (36.0 ttl pk-yrs)     Types: Cigarettes     Start date:      Quit date:      Years since quittin.5     Passive exposure: Never    Smokeless tobacco: Never   Vaping Use    Vaping status: Never Used   Substance and Sexual Activity    Alcohol use: Yes    Drug use: Not Currently    Sexual activity: Defer     Allergies:  Allergies   Allergen Reactions    Gabapentin Other (See Comments)     Severe shaking    Lyrica [Pregabalin] Other (See Comments)      Severe shaking    Codeine Itching and Rash     Immunizations:  Immunization History   Administered Date(s) Administered    Fluzone  >6mos 10/27/2016, 09/07/2018    Fluzone (or Fluarix & Flulaval for VFC) >6mos 11/23/2020, 10/15/2021, 11/18/2022, 10/20/2023    Fluzone High-Dose 65+YRS 11/08/2024    Influenza Seasonal Injectable 10/07/2017            In-Office Procedure(s):  No orders to display        ASCVD RIsk Score::  The ASCVD Risk score (Chris SHELLEY, et al., 2019) failed to calculate for the following reasons:    Risk score cannot be calculated because patient has a medical history suggesting prior/existing ASCVD    Imaging:    Results for orders placed during the hospital encounter of 03/17/25    XR Spine Lumbar 2 or 3 View    Narrative  This procedure was auto-finalized with no dictation required.       Results for orders placed during the hospital encounter of 03/17/25    CT Head Without Contrast    Narrative  CT HEAD WO CONTRAST    Date of Exam: 3/17/2025 5:38 PM EDT    Indication: fall.    Comparison: 6/14/2024    Technique: Axial CT images were obtained of the head without contrast administration.  Coronal reconstructions were performed.  Automated exposure control and iterative reconstruction methods were used.    Findings: No intracranial hemorrhage. Gray-white matter differentiation is maintained without evidence of an acute infarction. Multiple foci of decreased attenuation are present within the subcortical, deep cerebral, and periventricular white matter  consistent with chronic small vessel/microangiopathic ischemic changes. No extra-axial mass or collection. The ventricles and sulci are prominent commensurate with involutional changes. The posterior fossa appears grossly normal. Sellar and suprasellar  structures are normal.    Orbital and periorbital soft tissues are normal. The paranasal sinuses, ethmoid air cells, and mastoid air cells are aerated. The bony calvarium is  intact.    Impression  Impression: No acute intracranial pathology.          Electronically Signed: Zenon Rodriguez MD  3/17/2025 6:06 PM EDT  Workstation ID: [id number]      Results for orders placed during the hospital encounter of 03/17/25    CT Head Without Contrast    Narrative  CT HEAD WO CONTRAST    Date of Exam: 3/17/2025 5:38 PM EDT    Indication: fall.    Comparison: 6/14/2024    Technique: Axial CT images were obtained of the head without contrast administration.  Coronal reconstructions were performed.  Automated exposure control and iterative reconstruction methods were used.    Findings: No intracranial hemorrhage. Gray-white matter differentiation is maintained without evidence of an acute infarction. Multiple foci of decreased attenuation are present within the subcortical, deep cerebral, and periventricular white matter  consistent with chronic small vessel/microangiopathic ischemic changes. No extra-axial mass or collection. The ventricles and sulci are prominent commensurate with involutional changes. The posterior fossa appears grossly normal. Sellar and suprasellar  structures are normal.    Orbital and periorbital soft tissues are normal. The paranasal sinuses, ethmoid air cells, and mastoid air cells are aerated. The bony calvarium is intact.    Impression  Impression: No acute intracranial pathology.          Electronically Signed: Zenon Rodriguez MD  3/17/2025 6:06 PM EDT  Workstation ID: [id number]      Lab Review:   Hospital Outpatient Visit on 04/14/2025   Component Date Value    Heart rate minimum 04/14/2025 66     Heart rate maximum 04/14/2025 149     Heart rate (average) 04/14/2025 91     V-Tach - number of episo* 04/14/2025 0     SVT - number of episodes 04/14/2025 2     SVT - heart rate minimum 04/14/2025 107     SVT - heart rate maximum 04/14/2025 136     SVT - heart rate (averag* 04/14/2025 122     Longest SVT episode start 04/14/2025 04/19/2025  8:20:00 AM EDT     Longest SVT episode end  04/14/2025 04/19/2025  8:20:05 AM EDT     Longest SVT episode - du* 04/14/2025 4.7     Longest SVT episode - nu* 04/14/2025 9     Longest SVT episode - he* 04/14/2025 107     Longest SVT episode - he* 04/14/2025 122     Longest SVT episode - he* 04/14/2025 116     SVT with fastest heart r* 04/14/2025 04/17/2025  7:06:11 PM EDT     SVT with fastest heart r* 04/14/2025 04/17/2025  7:06:13 PM EDT     SVT with fastest heart r* 04/14/2025 2.8     SVT with fastest heart r* 04/14/2025 6     SVT with fastest heart r* 04/14/2025 124     SVT with fastest heart r* 04/14/2025 136     SVT with fastest heart r* 04/14/2025 129     Pause - number of episod* 04/14/2025 0     Isolated SVE frequency 04/14/2025 Rare     Isolated SVE count 04/14/2025 2,574     Ectopic SVE isolated per* 04/14/2025 0.14     SVE couplets frequency 04/14/2025 Rare     SVE couplets counts 04/14/2025 632     Ectopic SVE couplet perc* 04/14/2025 0.07     SVE triplets frequency 04/14/2025 Rare     SVE triplets counts 04/14/2025 289     Ectopic SVE triplet perc* 04/14/2025 0.05     Isolated VE frequency 04/14/2025 Rare     Isolated VE counts 04/14/2025 78     Ectopic VE isolated perc* 04/14/2025 0     VE couplets frequency 04/14/2025 0     VE couplets counts 04/14/2025 0     Ectopic VE couplet perce* 04/14/2025 0     VE triplets frequency 04/14/2025 0     VE triplets counts 04/14/2025 0     Ectopic VE triplet perce* 04/14/2025 0     Enrollment period start 04/14/2025 04/14/2025  3:26:22 PM EDT     Enrollment period end 04/14/2025 04/28/2025  3:26:22 PM EDT     Total enrollment period 04/14/2025 14 days 0 hours     Device analysis time 04/14/2025 13 days 17 hours     Total patient event diar* 04/14/2025 0     Total patient event trig* 04/14/2025 27     Combined report prescrip* 04/14/2025 COMPLETE    Admission on 03/17/2025, Discharged on 03/22/2025   Component Date Value    Glucose 03/17/2025 174 (H)     BUN 03/17/2025 13     Creatinine 03/17/2025 0.93      Sodium 03/17/2025 140     Potassium 03/17/2025 4.0     Chloride 03/17/2025 100     CO2 03/17/2025 24.3     Calcium 03/17/2025 10.1     Total Protein 03/17/2025 7.7     Albumin 03/17/2025 4.3     ALT (SGPT) 03/17/2025 8     AST (SGOT) 03/17/2025 18     Alkaline Phosphatase 03/17/2025 72     Total Bilirubin 03/17/2025 0.4     Globulin 03/17/2025 3.4     A/G Ratio 03/17/2025 1.3     BUN/Creatinine Ratio 03/17/2025 14.0     Anion Gap 03/17/2025 15.7 (H)     eGFR 03/17/2025 91.1     WBC 03/17/2025 9.29     RBC 03/17/2025 4.91     Hemoglobin 03/17/2025 13.5     Hematocrit 03/17/2025 42.4     MCV 03/17/2025 86.4     MCH 03/17/2025 27.5     MCHC 03/17/2025 31.8     RDW 03/17/2025 13.8     RDW-SD 03/17/2025 43.1     MPV 03/17/2025 9.9     Platelets 03/17/2025 390     Neutrophil % 03/17/2025 64.8     Lymphocyte % 03/17/2025 24.0     Monocyte % 03/17/2025 7.3     Eosinophil % 03/17/2025 2.6     Basophil % 03/17/2025 0.8     Immature Grans % 03/17/2025 0.5     Neutrophils, Absolute 03/17/2025 6.02     Lymphocytes, Absolute 03/17/2025 2.23     Monocytes, Absolute 03/17/2025 0.68     Eosinophils, Absolute 03/17/2025 0.24     Basophils, Absolute 03/17/2025 0.07     Immature Grans, Absolute 03/17/2025 0.05     nRBC 03/17/2025 0.0     Glucose 03/18/2025 237 (H)     Glucose 03/18/2025 205 (H)     ABO Type 03/18/2025 O     RH type 03/18/2025 Positive     Antibody Screen 03/18/2025 Negative     T&S Expiration Date 03/18/2025 3/21/2025 11:59:59 PM     Glucose 03/17/2025 172 (H)     Glucose 03/18/2025 196 (H)     BUN 03/18/2025 14     Creatinine 03/18/2025 0.99     Sodium 03/18/2025 139     Potassium 03/18/2025 4.1     Chloride 03/18/2025 101     CO2 03/18/2025 27.4     Calcium 03/18/2025 9.2     BUN/Creatinine Ratio 03/18/2025 14.1     Anion Gap 03/18/2025 10.6     eGFR 03/18/2025 84.5     Magnesium 03/18/2025 2.0     WBC 03/18/2025 12.00 (H)     RBC 03/18/2025 4.61     Hemoglobin 03/18/2025 12.8 (L)     Hematocrit 03/18/2025 40.5      MCV 03/18/2025 87.9     MCH 03/18/2025 27.8     MCHC 03/18/2025 31.6     RDW 03/18/2025 13.9     RDW-SD 03/18/2025 44.3     MPV 03/18/2025 9.6     Platelets 03/18/2025 352     Neutrophil % 03/18/2025 78.7 (H)     Lymphocyte % 03/18/2025 11.5 (L)     Monocyte % 03/18/2025 7.8     Eosinophil % 03/18/2025 1.3     Basophil % 03/18/2025 0.4     Immature Grans % 03/18/2025 0.3     Neutrophils, Absolute 03/18/2025 9.45 (H)     Lymphocytes, Absolute 03/18/2025 1.38     Monocytes, Absolute 03/18/2025 0.93 (H)     Eosinophils, Absolute 03/18/2025 0.16     Basophils, Absolute 03/18/2025 0.05     Immature Grans, Absolute 03/18/2025 0.03     nRBC 03/18/2025 0.0     OSMAN AURIS PCR 03/18/2025 Not Detected     Glucose 03/19/2025 199 (H)     Glucose 03/19/2025 184 (H)     Glucose 03/18/2025 275 (H)     Glucose 03/19/2025 194 (H)     Glucose 03/18/2025 234 (H)     Glucose 03/19/2025 154 (H)     BUN 03/19/2025 14     Creatinine 03/19/2025 0.85     Sodium 03/19/2025 136     Potassium 03/19/2025 4.0     Chloride 03/19/2025 99     CO2 03/19/2025 27.1     Calcium 03/19/2025 9.3     BUN/Creatinine Ratio 03/19/2025 16.5     Anion Gap 03/19/2025 9.9     eGFR 03/19/2025 96.4     Magnesium 03/19/2025 2.0     WBC 03/19/2025 8.57     RBC 03/19/2025 4.33     Hemoglobin 03/19/2025 12.0 (L)     Hematocrit 03/19/2025 38.3     MCV 03/19/2025 88.5     MCH 03/19/2025 27.7     MCHC 03/19/2025 31.3 (L)     RDW 03/19/2025 13.8     RDW-SD 03/19/2025 44.4     MPV 03/19/2025 9.9     Platelets 03/19/2025 322     Neutrophil % 03/19/2025 65.8     Lymphocyte % 03/19/2025 19.3 (L)     Monocyte % 03/19/2025 12.3 (H)     Eosinophil % 03/19/2025 1.6     Basophil % 03/19/2025 0.6     Immature Grans % 03/19/2025 0.4     Neutrophils, Absolute 03/19/2025 5.65     Lymphocytes, Absolute 03/19/2025 1.65     Monocytes, Absolute 03/19/2025 1.05 (H)     Eosinophils, Absolute 03/19/2025 0.14     Basophils, Absolute 03/19/2025 0.05     Immature Grans, Absolute 03/19/2025  0.03     nRBC 03/19/2025 0.0     Glucose 03/20/2025 206 (H)     Glucose 03/20/2025 205 (H)     Glucose 03/19/2025 229 (H)     Glucose 03/20/2025 214 (H)     BUN 03/20/2025 14     Creatinine 03/20/2025 0.74 (L)     Sodium 03/20/2025 136     Potassium 03/20/2025 4.2     Chloride 03/20/2025 98     CO2 03/20/2025 29.5 (H)     Calcium 03/20/2025 9.3     BUN/Creatinine Ratio 03/20/2025 18.9     Anion Gap 03/20/2025 8.5     eGFR 03/20/2025 100.6     Magnesium 03/20/2025 2.0     WBC 03/20/2025 8.20     RBC 03/20/2025 4.49     Hemoglobin 03/20/2025 12.4 (L)     Hematocrit 03/20/2025 39.5     MCV 03/20/2025 88.0     MCH 03/20/2025 27.6     MCHC 03/20/2025 31.4 (L)     RDW 03/20/2025 13.5     RDW-SD 03/20/2025 43.8     MPV 03/20/2025 10.1     Platelets 03/20/2025 331     Neutrophil % 03/20/2025 57.3     Lymphocyte % 03/20/2025 24.3     Monocyte % 03/20/2025 12.0     Eosinophil % 03/20/2025 5.5     Basophil % 03/20/2025 0.5     Immature Grans % 03/20/2025 0.4     Neutrophils, Absolute 03/20/2025 4.71     Lymphocytes, Absolute 03/20/2025 1.99     Monocytes, Absolute 03/20/2025 0.98 (H)     Eosinophils, Absolute 03/20/2025 0.45 (H)     Basophils, Absolute 03/20/2025 0.04     Immature Grans, Absolute 03/20/2025 0.03     nRBC 03/20/2025 0.0     Glucose 03/21/2025 247 (H)     Glucose 03/20/2025 201 (H)     Glucose 03/20/2025 225 (H)     Glucose 03/22/2025 245 (H)     Glucose 03/21/2025 196 (H)     Glucose 03/21/2025 207 (H)     Glucose 03/21/2025 221 (H)     Glucose 03/21/2025 221 (H)     Glucose 03/21/2025 298 (H)    Office Visit on 01/24/2025   Component Date Value    Hemoglobin A1C 01/24/2025 9.30 (H)      Recent labs reviewed and interpreted for clinical significance and application            Level of Care:           Chidi Romero MD  06/25/25  .

## 2025-06-25 NOTE — TELEPHONE ENCOUNTER
Caller: CLYDE    Relationship:  PATIENTS WIFE    Best call back number:     What is the best time to reach you:     Who are you requesting to speak with (clinical staff, provider,  specific staff member):     Do you know the name of the person who called:     What was the call regarding: PATIENTS MARLENA TANG IS CALLING IN TO GET A UPDATE ON THE REFILL REQUEST FOR THE PATIENTS ondansetron ODT (ZOFRAN-ODT) 8 MG disintegrating tablet

## 2025-06-30 DIAGNOSIS — S32.030D COMPRESSION FRACTURE OF L3 VERTEBRA WITH ROUTINE HEALING, SUBSEQUENT ENCOUNTER: ICD-10-CM

## 2025-06-30 NOTE — TELEPHONE ENCOUNTER
Caller: Matthieu Hawk    Relationship: Self    Best call back number: [phone]    Requested Prescriptions:   Requested Prescriptions     Pending Prescriptions Disp Refills    oxyCODONE (ROXICODONE) 10 MG tablet 120 tablet 0     Sig: Take 1 tablet by mouth 4 (Four) Times a Day As Needed for Severe Pain.        Pharmacy where request should be sent: [facility] - [facility] [place] - [address] [facility] [phone] [facility] [phone]      Last office visit with prescribing clinician: 4/25/2025   Last telemedicine visit with prescribing clinician: Visit date not found   Next office visit with prescribing clinician: 7/25/2025     Additional details provided by patient:     Does the patient have less than a 3 day supply:  [x] Yes  [] No    Would you like a call back once the refill request has been completed: [] Yes [x] No    If the office needs to give you a call back, can they leave a voicemail: [] Yes [x] No    Hermelindo Guthrie Rep   06/30/25 12:15 EDT

## 2025-07-01 ENCOUNTER — HOSPITAL ENCOUNTER (OUTPATIENT)
Dept: PAIN MEDICINE | Facility: HOSPITAL | Age: 65
Discharge: HOME | End: 2025-07-01
Payer: MEDICARE

## 2025-07-01 VITALS
HEIGHT: 70 IN | SYSTOLIC BLOOD PRESSURE: 119 MMHG | TEMPERATURE: 97.3 F | HEART RATE: 97 BPM | BODY MASS INDEX: 42.37 KG/M2 | OXYGEN SATURATION: 93 % | WEIGHT: 296 LBS | RESPIRATION RATE: 16 BRPM | DIASTOLIC BLOOD PRESSURE: 80 MMHG

## 2025-07-01 DIAGNOSIS — M53.3 COCCYDYNIA: Primary | ICD-10-CM

## 2025-07-01 DIAGNOSIS — R52 PAIN: ICD-10-CM

## 2025-07-01 DIAGNOSIS — M54.16 LUMBAR RADICULOPATHY: ICD-10-CM

## 2025-07-01 PROCEDURE — 25510000001 IOPAMIDOL 41 % SOLUTION: Performed by: STUDENT IN AN ORGANIZED HEALTH CARE EDUCATION/TRAINING PROGRAM

## 2025-07-01 PROCEDURE — 25010000002 METHYLPREDNISOLONE PER 40 MG: Performed by: STUDENT IN AN ORGANIZED HEALTH CARE EDUCATION/TRAINING PROGRAM

## 2025-07-01 PROCEDURE — 77003 FLUOROGUIDE FOR SPINE INJECT: CPT

## 2025-07-01 RX ORDER — IOPAMIDOL 408 MG/ML
3 INJECTION, SOLUTION INTRATHECAL
Status: COMPLETED | OUTPATIENT
Start: 2025-07-01 | End: 2025-07-01

## 2025-07-01 RX ORDER — METHYLPREDNISOLONE ACETATE 40 MG/ML
40 INJECTION, SUSPENSION INTRA-ARTICULAR; INTRALESIONAL; INTRAMUSCULAR; SOFT TISSUE ONCE
Status: COMPLETED | OUTPATIENT
Start: 2025-07-01 | End: 2025-07-01

## 2025-07-01 RX ADMIN — METHYLPREDNISOLONE ACETATE 40 MG: 40 INJECTION, SUSPENSION INTRA-ARTICULAR; INTRALESIONAL; INTRAMUSCULAR; INTRASYNOVIAL; SOFT TISSUE at 13:33

## 2025-07-01 RX ADMIN — IOPAMIDOL 3 ML: 408 INJECTION, SOLUTION INTRATHECAL at 13:32

## 2025-07-01 NOTE — PROCEDURES
PREOPERATIVE DIAGNOS  1.         Lumbar radiculopathy  2. Coccydynia      POSTOPERATIVE DIAGNOSIS:  1.  Same     PROCEDURE:  Caudal Epidural Steroid Injection      PROCEDURE NOTE:  After obtaining written informed consent patient was taken to the procedure room. Pre-procedure blood pressure and pulse were stable and recorded in patients clinic chart.      The patient was placed in the prone position on fluoroscopy table.  The lower back was prepped with chloraprep times three and draped in the usual sterile fashion.  The skin over the sacral hiatus was identified under fluoroscopic guidance and infiltrated with 1% lidocaine for local anesthesia via 25 gauge needle.  An 20-g spinal needle was used to access the epidural space under fluoroscopic guidance. 2 cc of the omnipaque dye was injected through the catheter with good spread seen from L5-S2 area.  40 mg of depomedrol  with 4 cc of saline as injected. There was no evidence of CSF, paresthesia or heme. The needle was cleared with preservative free local anesthetic and removed. Skin was cleaned and a sterile dressing was applied.     Following the procedure the patient's vital signs were stable. The patient was discharged home in good condition after being given discharge instructions.     COMPLICATIONS: None     Mane Jon DO  Pain Management   [facility]

## 2025-07-01 NOTE — DISCHARGE INSTRUCTIONS

## 2025-07-01 NOTE — H&P
H and P reviewed from previous visit and no changes to patient's clinical presentation. Will proceed with procedure as planned.     Mane Jon DO  Pain Management   [facility]

## 2025-07-02 ENCOUNTER — TELEPHONE (OUTPATIENT)
Dept: PAIN MEDICINE | Facility: HOSPITAL | Age: 65
End: 2025-07-02
Payer: MEDICARE

## 2025-07-02 RX ORDER — OXYCODONE HYDROCHLORIDE 10 MG/1
10 TABLET ORAL 4 TIMES DAILY PRN
Qty: 120 TABLET | Refills: 0 | Status: SHIPPED | OUTPATIENT
Start: 2025-07-02

## 2025-07-02 NOTE — TELEPHONE ENCOUNTER
Attempted post procedure phone call but no answer.  No message left on answering machine, mailbox full.

## 2025-07-08 ENCOUNTER — EXTERNAL PBMM DATA (OUTPATIENT)
Dept: PHARMACY | Facility: OTHER | Age: 65
End: 2025-07-08
Payer: MEDICARE

## 2025-07-16 RX ORDER — METFORMIN HYDROCHLORIDE 500 MG/1
1000 TABLET, EXTENDED RELEASE ORAL 2 TIMES DAILY
Qty: 180 TABLET | Refills: 0 | Status: SHIPPED | OUTPATIENT
Start: 2025-07-16

## 2025-07-22 NOTE — PROGRESS NOTES
Subjective   Matthieu Hawk is a 64 y.o. male is here for follow up for lower back pain.  Patient was last seen for caudal JENNY.  Pain is significantly improved now.  His main complaint is axial lower back pain but is mostly tolerable with pain medications.    On last visit:     Axial lower back pain is 6/10 on VAS, at maximum is 7/10. Pain is sharp, shooting, and stabbing in nature. Pain is referred intermittently to right posterior leg. The pain is constant. The pain is improved by nothing. The pain is worse with walking, standing.  Main complaint is axial lower back pain    Previous Injection:   7/1/2025-caudal JENNY- 80% pain relief with functional improvement.   5/15/25 - B/l SI joint injection -  70% pain relief with functional improvement.   3/18/2025-kyphoplasty L3 and L5- good relief for few days.       Hx: presented to [facility], [place], operated by [facility] ER after falling out of utility trailer on 3/13/2025.  He immediately had increased lower back pain and unfortunately worsened over the next couple days leading to ER on 3/17/2024.  Due to severe pain, patient was unable to walk and has been on escalating doses of pain medications while being inpatient and still unable to move significantly due to pain.  He denies any bowel or bladder incontinence or saddle anesthesia.  MRI lumbar spine was done showing acute compression fracture of L3 and L5 vertebral levels.  Evaluated by neurosurgery and not a surgical candidate.  Unable to tolerate TLSO due to severe pain.  He had been previously seen by outside pain management and had multiple epidurals and MBB's without significant pain relief.                  PMH:   Morbid obesity, DM-2, depression, CVA     Current Medications:   Norco  Cymbalta       Previous medications:   Flexeril  Gabapentin  Lyrica  Baclofen        PEG Assessment   What number best describes your pain on average in the past week?10  What number best describes how, during the past week, pain has interfered with your  "enjoyment of life?10  What number best describes how, during the past week, pain has interfered with your general activity?  10       Current Outpatient Medications:     Accu-Chek Softclix Lancets lancets, 1 each by Other route 4 (Four) Times a Day Before Meals & at Bedtime. Dx: E11.65. Use as instructed, Disp: 200 each, Rfl: 2    amLODIPine (NORVASC) 5 MG tablet, Take 1 tablet by mouth Daily., Disp: 90 tablet, Rfl: 3    aspirin 81 MG chewable tablet, Chew 1 tablet Daily. Indications: Disease involving Lipid Deposits in the Arteries, Disp: 90 tablet, Rfl: 3    atorvastatin (LIPITOR) 20 MG tablet, Take 1 tablet by mouth Daily. Indications: High Amount of Fats in the Blood, Disp: 90 tablet, Rfl: 3    B-D UF III MINI PEN NEEDLES 31G X 5 MM misc, Inject 1 Needle under the skin into the appropriate area as directed 2 (Two) Times a Day. use as directed, Disp: 60 each, Rfl: 3    BD Insulin Syringe U/F 31G X 5/16\" 1 ML misc, Inject 1 syringe under the skin into the appropriate area as directed Daily., Disp: 60 each, Rfl: 1    carvedilol (COREG) 12.5 MG tablet, Take 1 tablet by mouth 2 (Two) Times a Day With Meals. Indications: High Blood Pressure Disorder, Disp: 180 tablet, Rfl: 3    Continuous Glucose Sensor (FreeStyle Mason 2 Sensor) misc, apply 1 unit TO SKIN AND replace every 14 DAYS TO check blood pressure, Disp: 4 each, Rfl: 3    dapagliflozin Propanediol 10 MG tablet, Take 10 mg by mouth Daily., Disp: , Rfl:     Dulaglutide 4.5 MG/0.5ML solution auto-injector, Inject 4.5 mg under the skin into the appropriate area as directed 1 (One) Time Per Week., Disp: 2 mL, Rfl: 11    DULoxetine (CYMBALTA) 60 MG capsule, Take 1 capsule by mouth 2 (Two) Times a Day. Indications: Fibromyalgia Syndrome, Disp: 180 capsule, Rfl: 3    finasteride (PROSCAR) 5 MG tablet, Take 1 tablet by mouth Daily. Indications: Benign Enlargement of Prostate, Disp: 90 tablet, Rfl: 3    Glucagon (Baqsimi One Pack) 3 MG/DOSE powder, Administer 3 mg into " the nostril(s) as directed by provider As Needed (hypoglycemia)., Disp: 1 each, Rfl: 1    glucose blood (Accu-Chek Guide Test) test strip, 1 each by Other route 4 (Four) Times a Day Before Meals & at Bedtime. Dx: E11.65. Use as instructed, Disp: 150 each, Rfl: 2    Insulin Glargine (Lantus SoloStar) 100 UNIT/ML injection pen, Inject 70 Units under the skin into the appropriate area as directed Every Morning., Disp: , Rfl:     Insulin Glargine (Lantus SoloStar) 100 UNIT/ML injection pen, Inject 50 Units under the skin into the appropriate area as directed Every Evening., Disp: , Rfl:     Insulin Glargine (Lantus SoloStar) 100 UNIT/ML injection pen, Inject 70 units subcutaneously each morning. Inject 60 units subcutaneously each evening. Indications: Type 2 Diabetes, Disp: 30 mL, Rfl: 3    lidocaine (LIDODERM) 5 %, Place 2 patches on the skin as directed by provider Daily. Remove & Discard patch within 12 hours or as directed by MD, Disp: 60 patch, Rfl: 2    losartan-hydrochlorothiazide (HYZAAR) 100-12.5 MG per tablet, Take 1 tablet by mouth Daily., Disp: , Rfl:     metFORMIN ER (GLUCOPHAGE-XR) 500 MG 24 hr tablet, TAKE 2 TABLETS BY MOUTH TWICE DAILY, Disp: 180 tablet, Rfl: 0    naloxone (NARCAN) 4 MG/0.1ML nasal spray, Call 911. Don't prime. [place] in 1 nostril for overdose. Repeat in 2-3 minutes in other nostril if no or minimal breathing/responsiveness., Disp: 2 each, Rfl: 0    omeprazole (priLOSEC) 40 MG capsule, Take 1 capsule by mouth 2 (Two) Times a Day. Indications: Heartburn, Disp: 180 capsule, Rfl: 3    ondansetron ODT (ZOFRAN-ODT) 8 MG disintegrating tablet, Place 1 tablet on the tongue Every 8 (Eight) Hours As Needed for Nausea., Disp: 15 tablet, Rfl: 0    oxyCODONE (ROXICODONE) 10 MG tablet, Take 1 tablet by mouth 4 (Four) Times a Day As Needed for Severe Pain., Disp: 120 tablet, Rfl: 0    traZODone (DESYREL) 100 MG tablet, TAKE 2 TABLETS BY MOUTH EVERY EVENING, Disp: 180 tablet, Rfl: 1    The following  portions of the patient's history were reviewed and updated as appropriate: allergies, current medications, past family history, past medical history, past social history, past surgical history, and problem list.      REVIEW OF PERTINENT MEDICAL DATA    Past Medical History:   Diagnosis Date    Chronic back pain     Depression     Diabetes mellitus     Hypertension     Stroke      Past Surgical History:   Procedure Laterality Date    BONE SPUR LEG      CARPAL TUNNEL RELEASE Bilateral     RETINAL DETACHMENT REPAIR Bilateral     SINUS SURGERY      VERTEBROPLASTY N/A 3/21/2025    Procedure: Kyphoplasty L3 and L5 vertebral level;  Surgeon: Mane Jon DO;  Location: [facility] MAIN OR;  Service: Pain Management;  Laterality: N/A;     Family History   Problem Relation Age of Onset    Heart attack Father     Heart disease Father      Social History     Socioeconomic History    Marital status:    Tobacco Use    Smoking status: Former     Current packs/day: 0.00     Average packs/day: 2.0 packs/day for 18.0 years (36.0 ttl pk-yrs)     Types: Cigarettes     Start date:      Quit date:      Years since quittin.5     Passive exposure: Never    Smokeless tobacco: Never   Vaping Use    Vaping status: Never Used   Substance and Sexual Activity    Alcohol use: Yes    Drug use: Not Currently    Sexual activity: Defer         Review of Systems   Musculoskeletal:  Positive for arthralgias and back pain.         Vitals:    25 1359   BP: 113/69   Pulse: 95   Resp: 16   SpO2: 96%   Weight: 134 kg (296 lb)   PainSc: 6                Objective   Physical Exam  Musculoskeletal:         General: Tenderness present.        Legs:            Imaging Reviewed:  MRI lumbar spine with and without contrast-3/17/2025  - Acute mild compression fracture of inferior endplate of L3 and superior endplate of L5 without significant osseous retropulsion.  Mild associated bone marrow edema and enhancement.  - Increased prominence of  ventral epidural space from L3-L4 through the sacrum with both steer hyper and hypointense signal-suspect mostly combination of epidural fat and prominence of epidural venous plexus though a small epidural hematoma in the setting of trauma is not excluded  L1-2-moderate facet arthropathy  L2-3-moderate bilateral facet arthropathy, ligamentum flavum thickening, small bilateral facet effusions  L3-4-severe bilateral facet arthropathy, ligamentum flavum thickening, moderate spinal canal stenosis.  Moderate to severe bilateral foraminal narrowing.  L4-5-severe bilateral facet arthropathy, ligamentum flavum thickening  L5-S1-mild spinal canal stenosis due to epidural process as above.  Moderate bilateral facet arthropathy.    Assessment:    1. Coccydynia    2. Lumbar spondylosis          Plan:   S/p kyphoplasty at L3 and L5 with significant improvement in pain over fractures.   I also discussed possibility of lumbar MRI to rule out any additional fractures but patient would like to hold off for now.  He has failed gabapentin, Lyrica, celebrex, meloxicam, tizanidine in past.   Relief with caudal JENNY and bilateral SI joint injections.  Main complaint is axial lower back pain.  He had RFA done in the past, unsure what levels.  Do not have any records from previous pain management.  May consider bilateral L3-5 diagnostic MBB in future if axial lower back pain worsens.  Recommend holding off as pain is mostly tolerable at this point.    RTC as needed.    INSPECT REPORT    As part of the patient's treatment plan, I may be prescribing controlled substances. The patient has been made aware of appropriate use of such medications, including potential risk of somnolence, limited ability to drive and/or work safely, and the potential for dependence or overdose. It has also been made clear that these medications are for use by this patient only, without concomitant use of alcohol or other substances unless prescribed.     Patient has  completed prescribing agreement detailing terms of continued prescribing of controlled substances, including monitoring INSPECT reports, urine drug screening, and pill counts if necessary. The patient is aware that inappropriate use will results in cessation of prescribing such medications.    INSPECT report has been reviewed and scanned into the patient's chart.

## 2025-07-23 ENCOUNTER — OFFICE VISIT (OUTPATIENT)
Dept: PAIN MEDICINE | Facility: CLINIC | Age: 65
End: 2025-07-23
Payer: MEDICARE

## 2025-07-23 VITALS
RESPIRATION RATE: 16 BRPM | SYSTOLIC BLOOD PRESSURE: 113 MMHG | WEIGHT: 296 LBS | BODY MASS INDEX: 42.47 KG/M2 | HEART RATE: 95 BPM | DIASTOLIC BLOOD PRESSURE: 69 MMHG | OXYGEN SATURATION: 96 %

## 2025-07-23 DIAGNOSIS — M53.3 COCCYDYNIA: Primary | ICD-10-CM

## 2025-07-23 DIAGNOSIS — M47.816 LUMBAR SPONDYLOSIS: ICD-10-CM

## 2025-07-25 ENCOUNTER — LAB (OUTPATIENT)
Dept: FAMILY MEDICINE CLINIC | Facility: CLINIC | Age: 65
End: 2025-07-25
Payer: MEDICARE

## 2025-07-25 ENCOUNTER — OFFICE VISIT (OUTPATIENT)
Dept: FAMILY MEDICINE CLINIC | Facility: CLINIC | Age: 65
End: 2025-07-25
Payer: MEDICARE

## 2025-07-25 VITALS
SYSTOLIC BLOOD PRESSURE: 95 MMHG | WEIGHT: 295.2 LBS | OXYGEN SATURATION: 96 % | HEIGHT: 70 IN | HEART RATE: 89 BPM | DIASTOLIC BLOOD PRESSURE: 64 MMHG | BODY MASS INDEX: 42.26 KG/M2

## 2025-07-25 DIAGNOSIS — G89.4 CHRONIC PAIN DISORDER: ICD-10-CM

## 2025-07-25 DIAGNOSIS — Z12.11 SCREEN FOR COLON CANCER: ICD-10-CM

## 2025-07-25 DIAGNOSIS — E78.2 MIXED HYPERLIPIDEMIA: ICD-10-CM

## 2025-07-25 DIAGNOSIS — G89.29 CHRONIC PAIN OF LEFT KNEE: ICD-10-CM

## 2025-07-25 DIAGNOSIS — I10 ESSENTIAL (PRIMARY) HYPERTENSION: ICD-10-CM

## 2025-07-25 DIAGNOSIS — Z79.4 UNCONTROLLED DIABETES MELLITUS WITH HYPERGLYCEMIA, WITH LONG-TERM CURRENT USE OF INSULIN: Primary | ICD-10-CM

## 2025-07-25 DIAGNOSIS — E11.65 UNCONTROLLED DIABETES MELLITUS WITH HYPERGLYCEMIA, WITH LONG-TERM CURRENT USE OF INSULIN: Primary | ICD-10-CM

## 2025-07-25 DIAGNOSIS — M25.562 CHRONIC PAIN OF LEFT KNEE: ICD-10-CM

## 2025-07-25 DIAGNOSIS — T39.1X1A TYLENOL OVERDOSE, ACCIDENTAL OR UNINTENTIONAL, INITIAL ENCOUNTER: ICD-10-CM

## 2025-07-25 LAB
AMPHET+METHAMPHET UR QL: NEGATIVE
AMPHETAMINES UR QL: NEGATIVE
BARBITURATES UR QL SCN: NEGATIVE
BENZODIAZ UR QL SCN: POSITIVE
BUPRENORPHINE SERPL-MCNC: NEGATIVE NG/ML
CANNABINOIDS SERPL QL: NEGATIVE
COCAINE UR QL: NEGATIVE
HBA1C MFR BLD: 7.2 % (ref 4.8–5.6)
METHADONE UR QL SCN: NEGATIVE
OPIATES UR QL: NEGATIVE
OXYCODONE UR QL SCN: POSITIVE
PCP UR QL SCN: NEGATIVE
TRICYCLICS UR QL SCN: NEGATIVE

## 2025-07-25 PROCEDURE — 82570 ASSAY OF URINE CREATININE: CPT | Performed by: STUDENT IN AN ORGANIZED HEALTH CARE EDUCATION/TRAINING PROGRAM

## 2025-07-25 PROCEDURE — 80053 COMPREHEN METABOLIC PANEL: CPT | Performed by: STUDENT IN AN ORGANIZED HEALTH CARE EDUCATION/TRAINING PROGRAM

## 2025-07-25 PROCEDURE — 82043 UR ALBUMIN QUANTITATIVE: CPT | Performed by: STUDENT IN AN ORGANIZED HEALTH CARE EDUCATION/TRAINING PROGRAM

## 2025-07-25 PROCEDURE — 80306 DRUG TEST PRSMV INSTRMNT: CPT | Performed by: STUDENT IN AN ORGANIZED HEALTH CARE EDUCATION/TRAINING PROGRAM

## 2025-07-25 PROCEDURE — 83036 HEMOGLOBIN GLYCOSYLATED A1C: CPT | Performed by: STUDENT IN AN ORGANIZED HEALTH CARE EDUCATION/TRAINING PROGRAM

## 2025-07-25 PROCEDURE — 36415 COLL VENOUS BLD VENIPUNCTURE: CPT | Performed by: STUDENT IN AN ORGANIZED HEALTH CARE EDUCATION/TRAINING PROGRAM

## 2025-07-25 RX ORDER — TRIAMCINOLONE ACETONIDE 40 MG/ML
40 INJECTION, SUSPENSION INTRA-ARTICULAR; INTRAMUSCULAR
Status: COMPLETED | OUTPATIENT
Start: 2025-07-25 | End: 2025-07-25

## 2025-07-25 RX ORDER — LIDOCAINE HYDROCHLORIDE 10 MG/ML
5 INJECTION, SOLUTION INFILTRATION; PERINEURAL
Status: COMPLETED | OUTPATIENT
Start: 2025-07-25 | End: 2025-07-25

## 2025-07-25 RX ADMIN — LIDOCAINE HYDROCHLORIDE 5 ML: 10 INJECTION, SOLUTION INFILTRATION; PERINEURAL at 15:42

## 2025-07-25 RX ADMIN — TRIAMCINOLONE ACETONIDE 40 MG: 40 INJECTION, SUSPENSION INTRA-ARTICULAR; INTRAMUSCULAR at 15:42

## 2025-07-25 NOTE — PROGRESS NOTES
"Chief Complaint  Chief Complaint   Patient presents with    Primary Care Follow-Up     3 month follow up       Subjective        Matthieu Hawk is a 64 y.o. male who presents to [facility].  History of Present Illness    Matthieu is a 64-year-old male here for multiple concerns.        Chronic low back pain  Pain was previously controlled with oxycodone, however has been much worse since his fall back in March and subsequent kyphoplasty.  Still takes oxycodone 10 mg 4 times daily but has also been taking Tylenol.  States that he takes Tylenol 2000 mg a few times every day.  Patient recently saw pain management however told the specialist that I was managing the medications.  Previous medications include gabapentin, Lyrica, Celebrex, meloxicam, tizanidine      Left knee pain  Reports left knee pain for several years  Pain is constant and nothing seems to make it better or worse  Having problems sleeping at night due to pain  Does not notice any improvement with the oxycodone or Tylenol that he takes for his back.        Type 2 diabetes  Current medication regimen includes Trulicity, Lantus, metformin, and dapagliflozin  Has a CGM but states the sensors have been falling off a lot recently  Checks his blood pressure every once in a while but not consistently  Previously had several episodes of hypoglycemia but states this has not happened for quite a while          Objective   BP 95/64   Pulse 89   Ht 177.8 cm (70\")   Wt 134 kg (295 lb 3.2 oz)   SpO2 96%   BMI 42.36 kg/m²     Estimated body mass index is 42.36 kg/m² as calculated from the following:    Height as of this encounter: 177.8 cm (70\").    Weight as of this encounter: 134 kg (295 lb 3.2 oz).     Physical Exam   GEN: In no acute distress, non toxic appearing  HEENT: MMM. EOMI.   CV: No extremity edema.   RESP: No signs of respiratory distress.  SKIN: No rashes  MSK: No deformity.  Tenderness to palpation diffusely of left " knee but appears to be worse over the medial and lateral joint lines.  NEURO: Moves all extremities equally. Alert and appropriate       Result Review :    Left knee injection    Date/Time: 7/25/2025 3:42 PM    Performed by: Mustapha Barlow DO  Authorized by: Mustapha Barlow DO  Indications: pain   Body area: knee  Joint: left knee  Local anesthesia used: no    Anesthesia:  Local anesthesia used: no    Sedation:  Patient sedated: no    Preparation: Area cleansed with Betadine swab.  Needle size: 22 G  Ultrasound guidance: no  Approach: anterolateral.  Meds administered: 5 mL lidocaine 1 %; 40 mg triamcinolone acetonide 40 MG/ML  Patient tolerance: patient tolerated the procedure well with no immediate complications               Assessment and Plan     Diagnoses and all orders for this visit:    1. Uncontrolled diabetes mellitus with hyperglycemia, with long-term current use of insulin (Primary)  Episodes of hypoglycemia seem to have resolved  Will continue current regimen of Trulicity, Lantus (70 units a.m., 50 units p.m.),, metformin, dapagliflozin    -     Microalbumin / Creatinine Urine Ratio - Urine, Clean Catch  -     Comprehensive Metabolic Panel  -     Hemoglobin A1c    2. Chronic pain of left knee  Based on history and exam I suspect osteoarthritis as the primary cause of his pain  Discussed option for physical therapy which he will consider in the future    Unfortunately, has tried several medications in the past with no relief    Discussed option for intra-articular cortisone injection which he decided to proceed with today.  Discussed risk/benefits of the procedure.  Patient tolerated procedure well.  Discussed reasons to call/return to office.      3. Essential (primary) hypertension  Blood pressure is great today at 95/64  Given how low his blood pressure has been as well as his syncopal episodes cardiology did recommend he decrease the dose amlodipine to 5 mg daily.  Patient has not done  this yet as he wanted to check with me first.  I recommend that he proceed with this and see if that helps his dizziness/syncope/weakness.    Monitor blood pressure closely    -     Comprehensive Metabolic Panel    4. Chronic pain disorder  Given his debilitating, ongoing pain despite the dosing of his oxycodone, I recommend that patient return to pain management to discuss any medication adjustments and/or further imaging or interventions.      -     Urine Drug Screen - Urine, Clean Catch    5. Mixed hyperlipidemia      6. Screen for colon cancer  -     Cologuard - Stool, Per Rectum; Future    7. Tylenol overdose, accidental or unintentional, initial encounter  -     Hepatic Function Panel  -     Acetaminophen level    Other orders  -     Arthrocentesis            Follow Up     Return in about 3 months (around 10/25/2025) for Recheck - 30 minutes.

## 2025-07-26 LAB
ALBUMIN SERPL-MCNC: 4.4 G/DL (ref 3.5–5.2)
ALBUMIN UR-MCNC: 2.1 MG/DL
ALBUMIN/GLOB SERPL: 1.2 G/DL
ALP SERPL-CCNC: 82 U/L (ref 39–117)
ALT SERPL W P-5'-P-CCNC: 11 U/L (ref 1–41)
ANION GAP SERPL CALCULATED.3IONS-SCNC: 13.5 MMOL/L (ref 5–15)
AST SERPL-CCNC: 16 U/L (ref 1–40)
BILIRUB SERPL-MCNC: 0.3 MG/DL (ref 0–1.2)
BUN SERPL-MCNC: 15 MG/DL (ref 8–23)
BUN/CREAT SERPL: 16.1 (ref 7–25)
CALCIUM SPEC-SCNC: 10 MG/DL (ref 8.6–10.5)
CHLORIDE SERPL-SCNC: 98 MMOL/L (ref 98–107)
CO2 SERPL-SCNC: 25.5 MMOL/L (ref 22–29)
CREAT SERPL-MCNC: 0.93 MG/DL (ref 0.76–1.27)
CREAT UR-MCNC: 118.3 MG/DL
EGFRCR SERPLBLD CKD-EPI 2021: 91.1 ML/MIN/1.73
GLOBULIN UR ELPH-MCNC: 3.6 GM/DL
GLUCOSE SERPL-MCNC: 117 MG/DL (ref 65–99)
MICROALBUMIN/CREAT UR: 17.8 MG/G (ref 0–29)
POTASSIUM SERPL-SCNC: 4.5 MMOL/L (ref 3.5–5.2)
PROT SERPL-MCNC: 8 G/DL (ref 6–8.5)
SODIUM SERPL-SCNC: 137 MMOL/L (ref 136–145)

## 2025-07-28 DIAGNOSIS — S32.030D COMPRESSION FRACTURE OF L3 VERTEBRA WITH ROUTINE HEALING, SUBSEQUENT ENCOUNTER: ICD-10-CM

## 2025-07-28 RX ORDER — OXYCODONE HYDROCHLORIDE 10 MG/1
10 TABLET ORAL 4 TIMES DAILY PRN
Qty: 120 TABLET | Refills: 0 | Status: CANCELLED | OUTPATIENT
Start: 2025-07-28

## 2025-07-28 RX ORDER — FLURBIPROFEN SODIUM 0.3 MG/ML
1 SOLUTION/ DROPS OPHTHALMIC 2 TIMES DAILY
Qty: 60 EACH | Refills: 3 | Status: SHIPPED | OUTPATIENT
Start: 2025-07-28

## 2025-07-28 NOTE — TELEPHONE ENCOUNTER
Caller: CLYDE SPEARS    Relationship: Emergency Contact    Best call back number: [phone]     Requested Prescriptions:   Requested Prescriptions     Pending Prescriptions Disp Refills    B-D UF III MINI PEN NEEDLES 31G X 5 MM misc 60 each 3     Sig: Inject 1 Needle under the skin into the appropriate area as directed 2 (Two) Times a Day. use as directed        Pharmacy where request should be sent: [id number] - [place], [place] - [address] [facility] [phone] [facility] [phone] FX     Last office visit with prescribing clinician: 7/25/2025   Last telemedicine visit with prescribing clinician: Visit date not found   Next office visit with prescribing clinician: 10/27/2025     Additional details provided by patient: PATIENT HAS 4 DAYS LEFT    Does the patient have less than a 3 day supply:  [] Yes  [x] No        Hermelindo Wheat Rep   07/28/25 13:42 EDT

## 2025-07-28 NOTE — TELEPHONE ENCOUNTER
Caller: CLYDE SPEARS    Relationship: Emergency Contact    Best call back number:     Requested Prescriptions:   Requested Prescriptions     Pending Prescriptions Disp Refills    oxyCODONE (ROXICODONE) 10 MG tablet 120 tablet 0     Sig: Take 1 tablet by mouth 4 (Four) Times a Day As Needed for Severe Pain.        Pharmacy where request should be sent: [email or web address] [place] [place] [facility] [address] [facility] [phone] [facility] [phone]      Last office visit with prescribing clinician: 7/23/2025   Last telemedicine visit with prescribing clinician: Visit date not found   Next office visit with prescribing clinician: Visit date not found     Additional details provided by patient: PATIENTS WIFE STATES THAT DR. ENGLE WANTS THE PATIENT TO CONTINUE RECEIVING PAIN MEDICATION FROM DR. ACEVEDO DUE TO DR. ENGLE HAVING LIMITED ABILITY TO GO ANY HIGHER ON THE DOSAGE OF MEDICATIONS. PATIENTS WIFE IS ALSO REQUESTING A REFILL OF THIS MEDICATION IF POSSIBLE.     Does the patient have less than a 3 day supply:  [x] Yes  [] No    Would you like a call back once the refill request has been completed: [x] Yes [] No    If the office needs to give you a call back, can they leave a voicemail: [x] Yes [] No    Hermelindo Leung Rep   07/28/25 10:47 EDT

## 2025-07-29 ENCOUNTER — TELEPHONE (OUTPATIENT)
Dept: FAMILY MEDICINE CLINIC | Facility: CLINIC | Age: 65
End: 2025-07-29

## 2025-07-29 ENCOUNTER — TELEPHONE (OUTPATIENT)
Dept: FAMILY MEDICINE CLINIC | Facility: CLINIC | Age: 65
End: 2025-07-29
Payer: MEDICARE

## 2025-07-29 DIAGNOSIS — S32.030D COMPRESSION FRACTURE OF L3 VERTEBRA WITH ROUTINE HEALING, SUBSEQUENT ENCOUNTER: ICD-10-CM

## 2025-07-29 RX ORDER — OXYCODONE HYDROCHLORIDE 10 MG/1
10 TABLET ORAL 4 TIMES DAILY PRN
Qty: 120 TABLET | Refills: 0 | Status: CANCELLED | OUTPATIENT
Start: 2025-07-29

## 2025-07-30 DIAGNOSIS — M51.34 DEGENERATIVE DISC DISEASE, THORACIC: ICD-10-CM

## 2025-07-30 DIAGNOSIS — M51.362 DEGENERATION OF INTERVERTEBRAL DISC OF LUMBAR REGION WITH DISCOGENIC BACK PAIN AND LOWER EXTREMITY PAIN: Primary | ICD-10-CM

## 2025-08-01 DIAGNOSIS — S32.030D COMPRESSION FRACTURE OF L3 VERTEBRA WITH ROUTINE HEALING, SUBSEQUENT ENCOUNTER: ICD-10-CM

## 2025-08-01 RX ORDER — OXYCODONE HYDROCHLORIDE 10 MG/1
10 TABLET ORAL 4 TIMES DAILY PRN
Qty: 120 TABLET | Refills: 0 | Status: SHIPPED | OUTPATIENT
Start: 2025-08-01

## 2025-08-01 NOTE — TELEPHONE ENCOUNTER
Caller: CLYDE SPEARS    Relationship: Emergency Contact    Best call back number: [phone]    Requested Prescriptions:   Requested Prescriptions     Pending Prescriptions Disp Refills    oxyCODONE (ROXICODONE) 10 MG tablet 120 tablet 0     Sig: Take 1 tablet by mouth 4 (Four) Times a Day As Needed for Severe Pain.      Pharmacy where request should be sent: [email or web address] - [place], [place] - [address]  - [phone]  - [phone]      Last office visit with prescribing clinician: 7/25/2025   Last telemedicine visit with prescribing clinician: Visit date not found   Next office visit with prescribing clinician: 10/27/2025     Additional details provided by patient:   CLYDE STATES THAT PAIN MANAGEMENT IS REVIEWING THE REFERRAL AND PATIENT IS OUT OF MEDICINE AND NEEDING A BRIDGE.    Does the patient have less than a 3 day supply:  [x] Yes  [] No    Hermelindo Uriostegui Rep   08/01/25 10:43 EDT

## 2025-08-12 ENCOUNTER — OFFICE VISIT (OUTPATIENT)
Dept: ENDOCRINOLOGY | Facility: CLINIC | Age: 65
End: 2025-08-12
Payer: MEDICARE

## 2025-08-12 DIAGNOSIS — E11.42 TYPE 2 DIABETES MELLITUS WITH DIABETIC POLYNEUROPATHY, WITH LONG-TERM CURRENT USE OF INSULIN: Primary | ICD-10-CM

## 2025-08-12 DIAGNOSIS — Z79.4 TYPE 2 DIABETES MELLITUS WITH DIABETIC POLYNEUROPATHY, WITH LONG-TERM CURRENT USE OF INSULIN: Primary | ICD-10-CM

## 2025-08-12 PROCEDURE — G0109 DIAB MANAGE TRN IND/GROUP: HCPCS | Performed by: INTERNAL MEDICINE

## 2025-08-12 PROCEDURE — 3051F HG A1C>EQUAL 7.0%<8.0%: CPT | Performed by: INTERNAL MEDICINE

## 2025-08-12 RX ORDER — DULOXETIN HYDROCHLORIDE 60 MG/1
60 CAPSULE, DELAYED RELEASE ORAL EVERY 12 HOURS SCHEDULED
Qty: 650 CAPSULE | Refills: 0 | Status: SHIPPED | OUTPATIENT
Start: 2025-08-12

## 2025-08-12 RX ORDER — TRAZODONE HYDROCHLORIDE 100 MG/1
200 TABLET ORAL EVERY EVENING
Qty: 180 TABLET | Refills: 0 | Status: SHIPPED | OUTPATIENT
Start: 2025-08-12

## 2025-08-12 RX ORDER — OMEPRAZOLE 40 MG/1
40 CAPSULE, DELAYED RELEASE ORAL EVERY 12 HOURS SCHEDULED
Qty: 608 CAPSULE | Refills: 0 | Status: SHIPPED | OUTPATIENT
Start: 2025-08-12

## 2025-08-12 RX ORDER — METFORMIN HYDROCHLORIDE 500 MG/1
1000 TABLET, EXTENDED RELEASE ORAL 2 TIMES DAILY
Qty: 180 TABLET | Refills: 0 | Status: SHIPPED | OUTPATIENT
Start: 2025-08-12

## 2025-08-12 RX ORDER — ASPIRIN 81 MG
81 TABLET,CHEWABLE ORAL EVERY MORNING
Qty: 304 TABLET | Refills: 0 | Status: SHIPPED | OUTPATIENT
Start: 2025-08-12

## 2025-08-12 RX ORDER — CARVEDILOL 12.5 MG/1
12.5 TABLET ORAL EVERY 12 HOURS SCHEDULED
Qty: 650 TABLET | Refills: 0 | Status: SHIPPED | OUTPATIENT
Start: 2025-08-12

## 2025-08-12 RX ORDER — ATORVASTATIN CALCIUM 20 MG/1
20 TABLET, FILM COATED ORAL EVERY EVENING
Qty: 332 TABLET | Refills: 0 | Status: SHIPPED | OUTPATIENT
Start: 2025-08-12

## 2025-08-12 RX ORDER — DAPAGLIFLOZIN 10 MG/1
1 TABLET, FILM COATED ORAL EVERY MORNING
Qty: 304 TABLET | Refills: 0 | Status: SHIPPED | OUTPATIENT
Start: 2025-08-12

## 2025-08-12 RX ORDER — FINASTERIDE 5 MG/1
5 TABLET, FILM COATED ORAL EVERY MORNING
Qty: 304 TABLET | Refills: 0 | Status: SHIPPED | OUTPATIENT
Start: 2025-08-12

## 2025-08-20 DIAGNOSIS — I10 ESSENTIAL (PRIMARY) HYPERTENSION: ICD-10-CM

## 2025-08-20 RX ORDER — LOSARTAN POTASSIUM AND HYDROCHLOROTHIAZIDE 12.5; 1 MG/1; MG/1
1 TABLET ORAL EVERY MORNING
Qty: 152 TABLET | Refills: 0 | Status: SHIPPED | OUTPATIENT
Start: 2025-08-20

## 2025-08-29 DIAGNOSIS — S32.030D COMPRESSION FRACTURE OF L3 VERTEBRA WITH ROUTINE HEALING, SUBSEQUENT ENCOUNTER: ICD-10-CM

## 2025-08-29 RX ORDER — OXYCODONE HYDROCHLORIDE 10 MG/1
10 TABLET ORAL 4 TIMES DAILY PRN
Qty: 120 TABLET | Refills: 0 | Status: SHIPPED | OUTPATIENT
Start: 2025-08-29

## (undated) DEVICE — GLV SURG BIOGEL LTX PF 8

## (undated) DEVICE — THE STERILE LIGHT HANDLE COVER IS USED WITH STERIS SURGICAL LIGHTING AND VISUALIZATION SYSTEMS.

## (undated) DEVICE — INTENDED FOR TISSUE SEPARATION, AND OTHER PROCEDURES THAT REQUIRE A SHARP SURGICAL BLADE TO PUNCTURE OR CUT.: Brand: BARD-PARKER ® CARBON RIB-BACK BLADES

## (undated) DEVICE — C-ARM: Brand: UNBRANDED

## (undated) DEVICE — DRSNG TELFA PAD NONADH STR 1S 3X4IN

## (undated) DEVICE — 3M™ IOBAN™ 2 ANTIMICROBIAL INCISE DRAPE 6650EZ: Brand: IOBAN™ 2

## (undated) DEVICE — UNDERGLV SURG BIOGEL INDICATOR LF PF 7.5

## (undated) DEVICE — SPNG GZ WOVN 4X4IN 12PLY 10/BX STRL

## (undated) DEVICE — DRAPE SHEET ULTRAGARD: Brand: MEDLINE

## (undated) DEVICE — 11G 20MM FRACTURE KIT: Brand: OMNICURVE

## (undated) DEVICE — GLV SURG SIGNATURE ESSENTIAL PF LTX SZ7.5

## (undated) DEVICE — PK MINOR LAPAROTOMY 50

## (undated) DEVICE — BONE BIOPSY KIT: Brand: IVAS

## (undated) DEVICE — ORG INST STRIP/TS ADHS 2X10IN YEL STRL

## (undated) DEVICE — ACCESS CANNULA: Brand: IVAS

## (undated) DEVICE — SHEET, DRAPE, SPLIT, STERILE: Brand: MEDLINE

## (undated) DEVICE — BNDG ADHS PLSTC 1X3IN LF

## (undated) DEVICE — THE STERILE CAMERA HANDLE COVER IS FOR USE WITH THE STERIS SURGICAL LIGHTING AND VISUALIZATION SYSTEMS.

## (undated) DEVICE — KT SURG TURNOVER 050

## (undated) DEVICE — NDL SPINE 22G 5IN BLK

## (undated) DEVICE — UNDERGLV SURG BIOGEL INDICAT PI SZ8 BLU

## (undated) DEVICE — DRP OPMI 326071